# Patient Record
Sex: MALE | Race: WHITE | NOT HISPANIC OR LATINO | Employment: OTHER | URBAN - METROPOLITAN AREA
[De-identification: names, ages, dates, MRNs, and addresses within clinical notes are randomized per-mention and may not be internally consistent; named-entity substitution may affect disease eponyms.]

---

## 2017-12-23 ENCOUNTER — APPOINTMENT (OUTPATIENT)
Dept: GENERAL RADIOLOGY | Facility: HOSPITAL | Age: 67
End: 2017-12-23

## 2017-12-23 PROCEDURE — 71020 HC CHEST PA AND LATERAL: CPT | Performed by: FAMILY MEDICINE

## 2017-12-23 PROCEDURE — 71020 XR CHEST 2 VW: CPT | Performed by: FAMILY MEDICINE

## 2018-01-09 ENCOUNTER — OFFICE VISIT (OUTPATIENT)
Dept: FAMILY MEDICINE CLINIC | Facility: CLINIC | Age: 68
End: 2018-01-09

## 2018-01-09 VITALS
TEMPERATURE: 98.5 F | HEART RATE: 90 BPM | OXYGEN SATURATION: 97 % | BODY MASS INDEX: 36.48 KG/M2 | SYSTOLIC BLOOD PRESSURE: 160 MMHG | DIASTOLIC BLOOD PRESSURE: 95 MMHG | WEIGHT: 247 LBS

## 2018-01-09 DIAGNOSIS — I10 ESSENTIAL HYPERTENSION: Primary | ICD-10-CM

## 2018-01-09 DIAGNOSIS — J18.9 COMMUNITY ACQUIRED PNEUMONIA, UNSPECIFIED LATERALITY: ICD-10-CM

## 2018-01-09 DIAGNOSIS — R06.02 SHORTNESS OF BREATH: ICD-10-CM

## 2018-01-09 DIAGNOSIS — J45.901 ACUTE EXACERBATION OF COPD WITH ASTHMA (HCC): ICD-10-CM

## 2018-01-09 DIAGNOSIS — J44.1 ACUTE EXACERBATION OF COPD WITH ASTHMA (HCC): ICD-10-CM

## 2018-01-09 PROCEDURE — 99214 OFFICE O/P EST MOD 30 MIN: CPT | Performed by: FAMILY MEDICINE

## 2018-01-09 RX ORDER — DOXYCYCLINE HYCLATE 100 MG/1
100 CAPSULE ORAL 2 TIMES DAILY
Qty: 28 CAPSULE | Refills: 0 | OUTPATIENT
Start: 2018-01-09 | End: 2018-02-06

## 2018-01-09 RX ORDER — PREDNISONE 10 MG/1
TABLET ORAL
Qty: 63 TABLET | Refills: 0 | Status: SHIPPED | OUTPATIENT
Start: 2018-01-09 | End: 2018-01-21

## 2018-01-09 RX ORDER — HYDROCHLOROTHIAZIDE 25 MG/1
25 TABLET ORAL DAILY
Qty: 30 TABLET | Refills: 5 | Status: SHIPPED | OUTPATIENT
Start: 2018-01-09 | End: 2018-08-30 | Stop reason: SDUPTHER

## 2018-01-09 NOTE — PROGRESS NOTES
Subjective   Montana Franco is a 67 y.o. male. Presents today for   Chief Complaint   Patient presents with   • Establish Care     having problems with breathing, has taken several bouts of antibiotics and prednisone and doing some better.  His blood pressure stays high all the time.     New patient here to establish care;  I saw in  x2 in past and decided to transfer here.    Pneumonia   He complains of chest tightness, cough, difficulty breathing, frequent throat clearing, shortness of breath, sputum production and wheezing. Primary symptoms comments: In  gave levoquin for cap and steroid tapered dose for copd exacerbation.  Reports is doing much better, but still productive cough.  Originally dx severe cap 12/23/17.  At time saw, had nebulized colloidal silver that appeared to irritate lungs.. This is a recurrent problem. The current episode started 1 to 4 weeks ago. The problem occurs intermittently. The problem has been gradually improving. The cough is productive of brown sputum and productive of sputum. Pertinent negatives include no chest pain, fever, nasal congestion, orthopnea, PND, postnasal drip, rhinorrhea or sore throat. Exacerbated by: +smoker. His symptoms are alleviated by beta-agonist, ipratropium, steroid inhaler and oral steroids (completed course of levaquin, feels started to help but worsening again.  Completed course of prednisone.). He reports moderate improvement on treatment. Risk factors for lung disease include smoking/tobacco exposure. His past medical history is significant for COPD and pneumonia.   Hypertension   This is a chronic problem. The current episode started more than 1 month ago. The problem is unchanged. The problem is uncontrolled. Associated symptoms include shortness of breath. Pertinent negatives include no chest pain, peripheral edema (was having edema, but reports resolved now.) or PND. (At home, 140s to 200;  Relates often high when in pain.) There are no associated  agents to hypertension. Risk factors for coronary artery disease include male gender and obesity. Past treatments include angiotensin blockers. There is no history of kidney disease, CAD/MI, CVA or heart failure. There is no history of chronic renal disease.      Still using some e. cig    Review of Systems   Constitutional: Negative for chills and fever.   HENT: Negative for postnasal drip, rhinorrhea and sore throat.    Respiratory: Positive for cough, sputum production, shortness of breath and wheezing.    Cardiovascular: Negative for chest pain and PND.       The following portions of the patient's history were reviewed and updated as appropriate: allergies, current medications, past medical history, past social history and problem list.    Patient Active Problem List   Diagnosis   • Dry eyes   • Dry mouth   • Low back pain   • Pinched nerve     Past Medical History:   Diagnosis Date   • COPD (chronic obstructive pulmonary disease)    • Hypertension      No past surgical history on file.    Family History   Problem Relation Age of Onset   • Hypertension Mother    • Hypertension Father      Social History     Social History   • Marital status:      Spouse name: N/A   • Number of children: N/A   • Years of education: N/A     Social History Main Topics   • Smoking status: Current Every Day Smoker     Years: 54.00     Types: Cigars   • Smokeless tobacco: Never Used      Comment: smokes the ecig as well   • Alcohol use No   • Drug use: None   • Sexual activity: Not Asked     Other Topics Concern   • None     Social History Narrative         No Known Allergies    Current Outpatient Prescriptions on File Prior to Visit   Medication Sig Dispense Refill   • albuterol (PROVENTIL) (2.5 MG/3ML) 0.083% nebulizer solution Take 2.5 mg by nebulization Every 4 (Four) Hours As Needed for Wheezing.     • budesonide-formoterol (SYMBICORT) 160-4.5 MCG/ACT inhaler Inhale 2 puffs 2 (Two) Times a Day.     • ipratropium-albuterol  (DUONEB) 0.5-2.5 mg/mL nebulizer Neb 4 times daily while awake and every 4 hours as needed shortness of breath 360 mL 0   • losartan (COZAAR) 100 MG tablet Take 1 tablet by mouth Daily. 30 tablet 0     No current facility-administered medications on file prior to visit.        Objective   Vitals:    01/09/18 1450 01/09/18 1550   BP: (!) 204/96 160/95   Pulse: 90    Temp: 98.5 °F (36.9 °C)    SpO2: 97%    Weight: 112 kg (247 lb)        Physical Exam   Constitutional: He appears well-developed and well-nourished.   HENT:   Head: Normocephalic and atraumatic.   Right Ear: Tympanic membrane normal.   Left Ear: Tympanic membrane normal.   Mouth/Throat: Uvula is midline, oropharynx is clear and moist and mucous membranes are normal.   Neck: Neck supple. No hepatojugular reflux and no JVD present. No thyromegaly present.   Cardiovascular: Normal rate, regular rhythm and normal heart sounds.  Exam reveals no gallop and no friction rub.    No murmur heard.  Pulmonary/Chest: Effort normal. No accessory muscle usage. No tachypnea. No respiratory distress. He has decreased breath sounds. He has wheezes. He has no rales.   Abdominal: Soft. Bowel sounds are normal. He exhibits no distension. There is no tenderness. There is no rebound and no guarding.   Musculoskeletal: He exhibits edema.   Neurological: He is alert.   Skin: Skin is warm and dry.   Psychiatric: He has a normal mood and affect. His behavior is normal.   Nursing note and vitals reviewed.      Assessment/Plan   Montana was seen today for establish care.    Diagnoses and all orders for this visit:    Essential hypertension  -     hydrochlorothiazide (HYDRODIURIL) 25 MG tablet; Take 1 tablet by mouth Daily.  -     Comprehensive Metabolic Panel    Community acquired pneumonia, unspecified laterality  -     predniSONE (DELTASONE) 10 MG tablet; 6 tabs po qd x 3 days, 5 tabs po x 3d, 4 tabs po qd x 3d, 3 tabs po qd x 3 d, 2 tabs po qd x 3 days, 1 tab po qd x 3 days  -      doxycycline (VIBRAMYCIN) 100 MG capsule; Take 1 capsule by mouth 2 (Two) Times a Day.  -     CBC & Differential  -     Comprehensive Metabolic Panel  -     Sedimentation Rate  -     BNP  -     Legionella Antigen, Urine - Urine, Clean Catch  -     Histoplasma Ag Ur - Urine, Clean Catch    Acute exacerbation of COPD with asthma  -     CBC & Differential  -     Comprehensive Metabolic Panel  -     Sedimentation Rate  -     BNP  -     Legionella Antigen, Urine - Urine, Clean Catch  -     Histoplasma Ag Ur - Urine, Clean Catch  -     Ambulatory Referral to Pulmonology    Shortness of breath  -     CBC & Differential  -     Comprehensive Metabolic Panel  -     Sedimentation Rate  -     BNP  -     Legionella Antigen, Urine - Urine, Clean Catch  -     Histoplasma Ag Ur - Urine, Clean Catch    patient has had ongoing respiratory issues for several weeks now;  Has has some edema, no jvd or hjr, had inc inf of cap vs chf;  Will check bnp;  Will prolong abx and steroid;  Refer to pulmonary;  Reproting thick purulent sputum;  Continue inhalers  BP still not goal, continue losartan and will add hctz;  Stop furosemide.  Continue neb tx and symbicort (paying out of pocket).   Recommend avoid e. cig.  Stop e. cig    OBTW back pain, chiropractic care with relief in past, but person liked to see left practice.  OMT:  +tart anterior innomniate, sacral torsion, thoracic sd;  ME, HVLA with resolution of symptoms and improvement in findings.       -Follow up: 6 weeks       Current Outpatient Prescriptions:   •  albuterol (PROVENTIL) (2.5 MG/3ML) 0.083% nebulizer solution, Take 2.5 mg by nebulization Every 4 (Four) Hours As Needed for Wheezing., Disp: , Rfl:   •  budesonide-formoterol (SYMBICORT) 160-4.5 MCG/ACT inhaler, Inhale 2 puffs 2 (Two) Times a Day., Disp: , Rfl:   •  ipratropium-albuterol (DUONEB) 0.5-2.5 mg/mL nebulizer, Neb 4 times daily while awake and every 4 hours as needed shortness of breath, Disp: 360 mL, Rfl: 0  •   losartan (COZAAR) 100 MG tablet, Take 1 tablet by mouth Daily., Disp: 30 tablet, Rfl: 0  •  doxycycline (VIBRAMYCIN) 100 MG capsule, Take 1 capsule by mouth 2 (Two) Times a Day., Disp: 28 capsule, Rfl: 0  •  hydrochlorothiazide (HYDRODIURIL) 25 MG tablet, Take 1 tablet by mouth Daily., Disp: 30 tablet, Rfl: 5  •  predniSONE (DELTASONE) 10 MG tablet, 6 tabs po qd x 3 days, 5 tabs po x 3d, 4 tabs po qd x 3d, 3 tabs po qd x 3 d, 2 tabs po qd x 3 days, 1 tab po qd x 3 days, Disp: 63 tablet, Rfl: 0

## 2018-01-10 LAB
ALBUMIN SERPL-MCNC: 4.4 G/DL (ref 3.6–4.8)
ALBUMIN/GLOB SERPL: 1.8 {RATIO} (ref 1.2–2.2)
ALP SERPL-CCNC: 57 IU/L (ref 39–117)
ALT SERPL-CCNC: 44 IU/L (ref 0–44)
AST SERPL-CCNC: 22 IU/L (ref 0–40)
BASOPHILS # BLD AUTO: 0 X10E3/UL (ref 0–0.2)
BASOPHILS NFR BLD AUTO: 0 %
BILIRUB SERPL-MCNC: 0.5 MG/DL (ref 0–1.2)
BNP SERPL-MCNC: 173 PG/ML (ref 0–100)
BUN SERPL-MCNC: 23 MG/DL (ref 8–27)
BUN/CREAT SERPL: 23 (ref 10–24)
CALCIUM SERPL-MCNC: 8.9 MG/DL (ref 8.6–10.2)
CHLORIDE SERPL-SCNC: 100 MMOL/L (ref 96–106)
CO2 SERPL-SCNC: 29 MMOL/L (ref 18–29)
CREAT SERPL-MCNC: 1.02 MG/DL (ref 0.76–1.27)
EOSINOPHIL # BLD AUTO: 0.1 X10E3/UL (ref 0–0.4)
EOSINOPHIL NFR BLD AUTO: 1 %
ERYTHROCYTE [DISTWIDTH] IN BLOOD BY AUTOMATED COUNT: 15.1 % (ref 12.3–15.4)
ERYTHROCYTE [SEDIMENTATION RATE] IN BLOOD BY WESTERGREN METHOD: 11 MM/HR (ref 0–30)
GLOBULIN SER CALC-MCNC: 2.4 G/DL (ref 1.5–4.5)
GLUCOSE SERPL-MCNC: 107 MG/DL (ref 65–99)
HCT VFR BLD AUTO: 47.6 % (ref 37.5–51)
HGB BLD-MCNC: 16.1 G/DL (ref 13–17.7)
IMM GRANULOCYTES # BLD: 0 X10E3/UL (ref 0–0.1)
IMM GRANULOCYTES NFR BLD: 0 %
LYMPHOCYTES # BLD AUTO: 3 X10E3/UL (ref 0.7–3.1)
LYMPHOCYTES NFR BLD AUTO: 22 %
MCH RBC QN AUTO: 30.2 PG (ref 26.6–33)
MCHC RBC AUTO-ENTMCNC: 33.8 G/DL (ref 31.5–35.7)
MCV RBC AUTO: 89 FL (ref 79–97)
MONOCYTES # BLD AUTO: 1 X10E3/UL (ref 0.1–0.9)
MONOCYTES NFR BLD AUTO: 7 %
NEUTROPHILS # BLD AUTO: 9.5 X10E3/UL (ref 1.4–7)
NEUTROPHILS NFR BLD AUTO: 70 %
PLATELET # BLD AUTO: 180 X10E3/UL (ref 150–379)
POTASSIUM SERPL-SCNC: 4.7 MMOL/L (ref 3.5–5.2)
PROT SERPL-MCNC: 6.8 G/DL (ref 6–8.5)
RBC # BLD AUTO: 5.33 X10E6/UL (ref 4.14–5.8)
SODIUM SERPL-SCNC: 143 MMOL/L (ref 134–144)
WBC # BLD AUTO: 13.8 X10E3/UL (ref 3.4–10.8)

## 2018-01-10 NOTE — PROGRESS NOTES
Call results to patient.  WBC mildly elevated with left shift, which could suggest infection, though did take steroid of which can raise as well.  Sed rate (marker for inflammation is normal).   His kidney and liver function is normal.  BNP marker for heart failure is very borderline.  If this doesn't continue to improve current regimen, we should check an ECHO and consider CT chest.

## 2018-01-21 ENCOUNTER — APPOINTMENT (OUTPATIENT)
Dept: GENERAL RADIOLOGY | Facility: HOSPITAL | Age: 68
End: 2018-01-21

## 2018-01-21 PROCEDURE — 71046 X-RAY EXAM CHEST 2 VIEWS: CPT | Performed by: FAMILY MEDICINE

## 2018-01-23 ENCOUNTER — TELEPHONE (OUTPATIENT)
Dept: FAMILY MEDICINE CLINIC | Facility: CLINIC | Age: 68
End: 2018-01-23

## 2018-01-23 DIAGNOSIS — I16.0 HYPERTENSIVE URGENCY: ICD-10-CM

## 2018-01-23 DIAGNOSIS — J18.9 COMMUNITY ACQUIRED PNEUMONIA, UNSPECIFIED LATERALITY: ICD-10-CM

## 2018-01-23 DIAGNOSIS — J44.1 ACUTE EXACERBATION OF CHRONIC OBSTRUCTIVE PULMONARY DISEASE (COPD) (HCC): ICD-10-CM

## 2018-01-23 DIAGNOSIS — R06.02 SHORTNESS OF BREATH: ICD-10-CM

## 2018-01-23 RX ORDER — IPRATROPIUM BROMIDE AND ALBUTEROL SULFATE 2.5; .5 MG/3ML; MG/3ML
SOLUTION RESPIRATORY (INHALATION)
Qty: 360 ML | Refills: 0 | Status: SHIPPED | OUTPATIENT
Start: 2018-01-23 | End: 2018-02-19 | Stop reason: SDUPTHER

## 2018-01-23 RX ORDER — LOSARTAN POTASSIUM 100 MG/1
100 TABLET ORAL DAILY
Qty: 30 TABLET | Refills: 5 | Status: SHIPPED | OUTPATIENT
Start: 2018-01-23 | End: 2018-08-30 | Stop reason: SDUPTHER

## 2018-01-26 ENCOUNTER — TELEPHONE (OUTPATIENT)
Dept: FAMILY MEDICINE CLINIC | Facility: CLINIC | Age: 68
End: 2018-01-26

## 2018-01-26 RX ORDER — POTASSIUM CHLORIDE 750 MG/1
10 TABLET, FILM COATED, EXTENDED RELEASE ORAL DAILY
Qty: 30 TABLET | Refills: 0 | Status: SHIPPED | OUTPATIENT
Start: 2018-01-26 | End: 2018-02-24 | Stop reason: SDUPTHER

## 2018-01-26 RX ORDER — FUROSEMIDE 40 MG/1
40 TABLET ORAL DAILY
Qty: 30 TABLET | Refills: 0 | Status: SHIPPED | OUTPATIENT
Start: 2018-01-26 | End: 2018-02-20 | Stop reason: SDUPTHER

## 2018-01-26 NOTE — TELEPHONE ENCOUNTER
Send in lasix 40mg 1 po daily and kcl 10meq po daily.  Also let know, if not improving lung wise, we may need to have his heart checked out and send to cardiology including ordering ECHO.  Will see what pulmonary thinks.  If any chest pain or dyspnea again, go to ER this time.      RRJ

## 2018-01-31 ENCOUNTER — TRANSCRIBE ORDERS (OUTPATIENT)
Dept: ADMINISTRATIVE | Facility: HOSPITAL | Age: 68
End: 2018-01-31

## 2018-01-31 DIAGNOSIS — B99.9 RECURRENT INFECTIONS: Primary | ICD-10-CM

## 2018-02-05 ENCOUNTER — HOSPITAL ENCOUNTER (OUTPATIENT)
Dept: CT IMAGING | Facility: HOSPITAL | Age: 68
Discharge: HOME | End: 2018-02-05
Attending: INTERNAL MEDICINE | Admitting: INTERNAL MEDICINE

## 2018-02-05 DIAGNOSIS — B99.9 RECURRENT INFECTIONS: ICD-10-CM

## 2018-02-05 PROCEDURE — 71250 CT THORAX DX C-: CPT

## 2018-02-19 DIAGNOSIS — J18.9 COMMUNITY ACQUIRED PNEUMONIA, UNSPECIFIED LATERALITY: ICD-10-CM

## 2018-02-19 DIAGNOSIS — J44.1 ACUTE EXACERBATION OF CHRONIC OBSTRUCTIVE PULMONARY DISEASE (COPD) (HCC): ICD-10-CM

## 2018-02-19 DIAGNOSIS — R06.02 SHORTNESS OF BREATH: ICD-10-CM

## 2018-02-19 RX ORDER — IPRATROPIUM BROMIDE AND ALBUTEROL SULFATE 2.5; .5 MG/3ML; MG/3ML
SOLUTION RESPIRATORY (INHALATION)
Qty: 360 ML | Refills: 0 | Status: SHIPPED | OUTPATIENT
Start: 2018-02-19 | End: 2018-05-21 | Stop reason: SDUPTHER

## 2018-02-20 ENCOUNTER — OFFICE VISIT (OUTPATIENT)
Dept: FAMILY MEDICINE CLINIC | Facility: CLINIC | Age: 68
End: 2018-02-20

## 2018-02-20 VITALS
WEIGHT: 254 LBS | BODY MASS INDEX: 37.62 KG/M2 | TEMPERATURE: 98.1 F | HEART RATE: 91 BPM | HEIGHT: 69 IN | OXYGEN SATURATION: 97 % | SYSTOLIC BLOOD PRESSURE: 135 MMHG | DIASTOLIC BLOOD PRESSURE: 85 MMHG

## 2018-02-20 DIAGNOSIS — R60.0 LOWER EXTREMITY EDEMA: ICD-10-CM

## 2018-02-20 DIAGNOSIS — R06.02 SHORTNESS OF BREATH: Primary | ICD-10-CM

## 2018-02-20 DIAGNOSIS — I10 ESSENTIAL HYPERTENSION: ICD-10-CM

## 2018-02-20 DIAGNOSIS — G89.29 CHRONIC MIDLINE LOW BACK PAIN WITHOUT SCIATICA: ICD-10-CM

## 2018-02-20 DIAGNOSIS — M54.50 CHRONIC MIDLINE LOW BACK PAIN WITHOUT SCIATICA: ICD-10-CM

## 2018-02-20 DIAGNOSIS — J44.1 CHRONIC OBSTRUCTIVE PULMONARY DISEASE WITH ACUTE EXACERBATION (HCC): ICD-10-CM

## 2018-02-20 PROBLEM — J44.9 COPD (CHRONIC OBSTRUCTIVE PULMONARY DISEASE) (HCC): Status: ACTIVE | Noted: 2018-02-20

## 2018-02-20 PROCEDURE — 99214 OFFICE O/P EST MOD 30 MIN: CPT | Performed by: FAMILY MEDICINE

## 2018-02-20 RX ORDER — FUROSEMIDE 80 MG
80 TABLET ORAL DAILY
Qty: 30 TABLET | Refills: 5 | Status: SHIPPED | OUTPATIENT
Start: 2018-02-20 | End: 2018-05-17

## 2018-02-20 RX ORDER — BUDESONIDE AND FORMOTEROL FUMARATE DIHYDRATE 160; 4.5 UG/1; UG/1
2 AEROSOL RESPIRATORY (INHALATION)
Qty: 3 INHALER | Refills: 3 | Status: SHIPPED | OUTPATIENT
Start: 2018-02-20 | End: 2018-09-25 | Stop reason: SDUPTHER

## 2018-02-20 RX ORDER — DEXAMETHASONE 0.5 MG/1
0.5 TABLET ORAL
COMMUNITY
End: 2018-03-13 | Stop reason: ALTCHOICE

## 2018-02-20 NOTE — PROGRESS NOTES
Subjective   Montana Franco is a 67 y.o. male. Presents today for   Chief Complaint   Patient presents with   • Shortness of Breath     pt is still soa. he already saw pulmonologist and ent. pt had ct of head last saturday at [facility] to look for sinus infection. was ordered by ent, he goes for follow up later this week for results. he was put on amoxicillin and has finished it. he dropped off sputum samples at pulmonoloist yesterday for culture to see what is in his lungs.   • Hypertension     pt here for follow up visit for his b/p. the steroids you prescribed him at urgent care caused him to swell but he kept taking them. his soa was better on the steroids, but the swelling was too much to handle. he did finish the course. he is now on dexamethasone       Shortness of Breath   This is a chronic problem. The current episode started more than 1 month ago. The problem occurs constantly. The problem has been waxing and waning. Associated symptoms include coryza, leg swelling (started with steroid, got better off.), rhinorrhea, sputum production and wheezing. Pertinent negatives include no abdominal pain, chest pain, fever, orthopnea or PND. Nothing aggravates the symptoms. Associated symptoms comments: Cough productive yellow sputum.    Had CT chest from pulmonary, noted emphysema changes, linear scarring o/w clear.  Reports Dr. Prieto sent to ENT and had CT of sinuses over weekend, has not heard back on results yet.    Submitted sputum from pulmonary as well.  Has f/u 2/23/18 with ENT and Pulmonary 3/1/18.  . He has tried beta agonist inhalers, oral steroids and steroid inhalers (Has had several rounds of abx, gets a little better, but then re-occurs.  Currently on dexamethasone.) for the symptoms. The treatment provided mild (Taking refill of amoxil given by last primary;  taking lasix for lower extremity swelling.) relief. There is no history of CAD or PE.   Hypertension   This is a chronic problem. The current  episode started more than 1 month ago. The problem has been waxing and waning since onset. The problem is uncontrolled. Associated symptoms include peripheral edema and shortness of breath. Pertinent negatives include no chest pain, orthopnea, palpitations or PND. There are no associated agents to hypertension. Risk factors for coronary artery disease include obesity. Past treatments include angiotensin blockers and diuretics. The current treatment provides mild improvement. There are no compliance problems.  There is no history of kidney disease or CAD/MI. There is no history of chronic renal disease.       Review of Systems   Constitutional: Negative for chills and fever.   HENT: Positive for rhinorrhea.    Respiratory: Positive for sputum production, shortness of breath and wheezing.    Cardiovascular: Positive for leg swelling (started with steroid, got better off.). Negative for chest pain, palpitations, orthopnea and PND.   Gastrointestinal: Negative for abdominal pain.       The following portions of the patient's history were reviewed and updated as appropriate: allergies, current medications, past medical history, past social history and problem list.    Patient Active Problem List   Diagnosis   • Dry eyes   • Dry mouth   • Low back pain   • Pinched nerve   • COPD (chronic obstructive pulmonary disease)       No Known Allergies    Current Outpatient Prescriptions on File Prior to Visit   Medication Sig Dispense Refill   • hydrochlorothiazide (HYDRODIURIL) 25 MG tablet Take 1 tablet by mouth Daily. 30 tablet 5   • ipratropium-albuterol (DUO-NEB) 0.5-2.5 mg/mL nebulizer USE 1 VIAL PER NEBULIZER FOUR TIMES DAILY WHILE AWAKE AND EVERY 4 HOURS AS NEEDED FOR SHORTNESS OF BREATH 360 mL 0   • losartan (COZAAR) 100 MG tablet Take 1 tablet by mouth Daily. 30 tablet 5   • potassium chloride (KLOR-CON) 10 MEQ CR tablet Take 1 tablet by mouth Daily. 30 tablet 0   • [DISCONTINUED] amLODIPine (NORVASC) 5 MG tablet Take 1  "tablet by mouth Daily. 30 tablet 2   • [DISCONTINUED] budesonide-formoterol (SYMBICORT) 160-4.5 MCG/ACT inhaler Inhale 2 puffs 2 (Two) Times a Day.     • [DISCONTINUED] furosemide (LASIX) 40 MG tablet Take 1 tablet by mouth Daily. 30 tablet 0   • [DISCONTINUED] levoFLOXacin (LEVAQUIN) 500 MG tablet Take 1 tablet by mouth Daily. 7 tablet 0   • [DISCONTINUED] predniSONE (DELTASONE) 10 MG tablet 6 tabs po qd x 3 days, 5 tabs po x 3d, 4 tabs po qd x 3d, 3 tabs po qd x 3 d, 2 tabs po qd x 3 days, 1 tab po qd x 3 days (start 1/22/18) 63 tablet 0     No current facility-administered medications on file prior to visit.        Objective   Vitals:    02/20/18 1408 02/20/18 1532   BP: 160/72 135/85   BP Location: Left arm    Patient Position: Sitting    Cuff Size: Large Adult    Pulse: 91    Temp: 98.1 °F (36.7 °C)    TempSrc: Oral    SpO2: 97%    Weight: 115 kg (254 lb)    Height: 175.3 cm (69.02\")        Physical Exam   Constitutional: He appears well-developed and well-nourished.   HENT:   Head: Normocephalic and atraumatic.   Neck: Neck supple. No JVD present. No thyromegaly present.   Cardiovascular: Normal rate, regular rhythm and normal heart sounds.  Exam reveals no gallop and no friction rub.    No murmur heard.  Pulmonary/Chest: Effort normal. No tachypnea. No respiratory distress. He has decreased breath sounds. He has wheezes. He has rhonchi. He has no rales.   Abdominal: Soft. Bowel sounds are normal. He exhibits no distension. There is no tenderness. There is no rebound and no guarding.   Musculoskeletal: He exhibits no edema.   Neurological: He is alert.   Skin: Skin is warm and dry.   Psychiatric: He has a normal mood and affect. His behavior is normal.   Nursing note and vitals reviewed.    Component      Latest Ref Rng & Units 1/9/2018   WBC      3.4 - 10.8 x10E3/uL 13.8 (H)   RBC      4.14 - 5.80 x10E6/uL 5.33   Hemoglobin      13.0 - 17.7 g/dL 16.1   Hematocrit      37.5 - 51.0 % 47.6   MCV      79 - 97 fL " 89   MCH      26.6 - 33.0 pg 30.2   MCHC      31.5 - 35.7 g/dL 33.8   RDW      12.3 - 15.4 % 15.1   Platelets      150 - 379 x10E3/uL 180   Neutrophil %      Not Estab. % 70   Lymphocyte %      Not Estab. % 22   Monocyte %      Not Estab. % 7   Eosinophil %      Not Estab. % 1   Basophil %      Not Estab. % 0   Neutrophils, Absolute      1.4 - 7.0 x10E3/uL 9.5 (H)   Lymphocytes, Absolute      0.7 - 3.1 x10E3/uL 3.0   Monocytes, Absolute      0.1 - 0.9 x10E3/uL 1.0 (H)   Eosinophils, Absolute      0.0 - 0.4 x10E3/uL 0.1   Basophils, Absolute      0.0 - 0.2 x10E3/uL 0.0   Immature Grans %      Not Estab. % 0   Immature Grans, Absolute      0.0 - 0.1 x10E3/uL 0.0   Glucose      65 - 99 mg/dL 107 (H)   BUN      8 - 27 mg/dL 23   Creatinine      0.76 - 1.27 mg/dL 1.02   eGFR Non African Amer      >59 mL/min/1.73 76   eGFR  African Amer      >59 mL/min/1.73 88   BUN/Creatinine Ratio      10 - 24 23   Sodium      134 - 144 mmol/L 143   Potassium      3.5 - 5.2 mmol/L 4.7   Chloride      96 - 106 mmol/L 100   Total CO2      18 - 29 mmol/L 29   Calcium      8.6 - 10.2 mg/dL 8.9   Total Protein      6.0 - 8.5 g/dL 6.8   Albumin      3.6 - 4.8 g/dL 4.4   Globulin      1.5 - 4.5 g/dL 2.4   A/G Ratio      1.2 - 2.2 1.8   Total Bilirubin      0.0 - 1.2 mg/dL 0.5   Alkaline Phosphatase      39 - 117 IU/L 57   AST (SGOT)      0 - 40 IU/L 22   ALT (SGPT)      0 - 44 IU/L 44   Sed Rate      0 - 30 mm/hr 11   BNP      0.0 - 100.0 pg/mL 173.0 (H)       Assessment/Plan   Montana was seen today for shortness of breath and hypertension.    Diagnoses and all orders for this visit:    Shortness of breath  -     Ambulatory Referral to Cardiology  -     budesonide-formoterol (SYMBICORT) 160-4.5 MCG/ACT inhaler; Inhale 2 puffs 2 (Two) Times a Day.    Lower extremity edema  -     furosemide (LASIX) 80 MG tablet; Take 1 tablet by mouth Daily.  -     Ambulatory Referral to Cardiology    Essential hypertension    Chronic obstructive pulmonary  disease with acute exacerbation  -     budesonide-formoterol (SYMBICORT) 160-4.5 MCG/ACT inhaler; Inhale 2 puffs 2 (Two) Times a Day.    Chronic midline low back pain without sciatica  -     Ambulatory Referral to Physical Therapy Evaluate and treat        -1 cxr noted pulmonary edema vs pna;  D/w and with dyspnea, edema recommend see cardiology to evaluate further as well.  Will also increase lasix to 80mg daily.  Continue follow with pulmonary.  -bp now well controlled;  Home BP reading well.  -OBTW:  reports back pain, chronic;  Worse with weight gain;  Refer to PT;  OMT in office with relief (ME).         -Follow up: 3 months       Current Outpatient Prescriptions:   •  budesonide-formoterol (SYMBICORT) 160-4.5 MCG/ACT inhaler, Inhale 2 puffs 2 (Two) Times a Day., Disp: 3 inhaler, Rfl: 3  •  dexamethasone (DECADRON) 0.5 MG tablet, Take 0.5 mg by mouth Daily With Breakfast., Disp: , Rfl:   •  furosemide (LASIX) 80 MG tablet, Take 1 tablet by mouth Daily., Disp: 30 tablet, Rfl: 5  •  hydrochlorothiazide (HYDRODIURIL) 25 MG tablet, Take 1 tablet by mouth Daily., Disp: 30 tablet, Rfl: 5  •  ipratropium-albuterol (DUO-NEB) 0.5-2.5 mg/mL nebulizer, USE 1 VIAL PER NEBULIZER FOUR TIMES DAILY WHILE AWAKE AND EVERY 4 HOURS AS NEEDED FOR SHORTNESS OF BREATH, Disp: 360 mL, Rfl: 0  •  losartan (COZAAR) 100 MG tablet, Take 1 tablet by mouth Daily., Disp: 30 tablet, Rfl: 5  •  potassium chloride (KLOR-CON) 10 MEQ CR tablet, Take 1 tablet by mouth Daily., Disp: 30 tablet, Rfl: 0

## 2018-02-26 RX ORDER — POTASSIUM CHLORIDE 750 MG/1
TABLET, FILM COATED, EXTENDED RELEASE ORAL
Qty: 30 TABLET | Refills: 2 | Status: SHIPPED | OUTPATIENT
Start: 2018-02-26 | End: 2018-05-17

## 2018-03-06 ENCOUNTER — TREATMENT (OUTPATIENT)
Dept: PHYSICAL THERAPY | Facility: CLINIC | Age: 68
End: 2018-03-06

## 2018-03-06 DIAGNOSIS — M25.69 BACK STIFFNESS: ICD-10-CM

## 2018-03-06 DIAGNOSIS — M54.50 CHRONIC BILATERAL LOW BACK PAIN WITHOUT SCIATICA: Primary | ICD-10-CM

## 2018-03-06 DIAGNOSIS — G89.29 CHRONIC BILATERAL LOW BACK PAIN WITHOUT SCIATICA: Primary | ICD-10-CM

## 2018-03-06 PROCEDURE — 97161 PT EVAL LOW COMPLEX 20 MIN: CPT | Performed by: PHYSICAL THERAPIST

## 2018-03-06 PROCEDURE — G8978 MOBILITY CURRENT STATUS: HCPCS | Performed by: PHYSICAL THERAPIST

## 2018-03-06 PROCEDURE — 97140 MANUAL THERAPY 1/> REGIONS: CPT | Performed by: PHYSICAL THERAPIST

## 2018-03-06 PROCEDURE — 97110 THERAPEUTIC EXERCISES: CPT | Performed by: PHYSICAL THERAPIST

## 2018-03-06 PROCEDURE — G8979 MOBILITY GOAL STATUS: HCPCS | Performed by: PHYSICAL THERAPIST

## 2018-03-06 NOTE — PATIENT INSTRUCTIONS
Access Code: [email or web address]   ACDate: 03/06/2018   Prepared by: Delmar Morales     Exercises  Supine Lower Trunk Rotation - 20 reps - 2 sets - 2 hold - 1x daily - 5x weekly  Supine Piriformis Stretch with Foot on Ground - 2 reps - 2 sets - 30 hold - 1x daily - 5x weekly  Supine Knees to Chest with Swiss Ball - 15 reps - 2 sets - 2 hold - 1x daily - 5x weekly  Bridge with Arms at Sides and Feet on Swiss Ball - 15 reps - 2 sets - 2 hold - 1x daily - 5x weekly  Supine Hamstring Curl on Swiss Ball - 15 reps - 2 sets - 2 hold - 1x daily - 5x weekly  Seated Table Hamstring Stretch - 2 reps - 2 sets - 30 hold - 1x daily - 5x weekly

## 2018-03-06 NOTE — PROGRESS NOTES
Physical Therapy Initial Evaluation and Plan of Care      Patient: Montana Franco   : 1950  Diagnosis/ICD-10 Code:  Chronic bilateral low back pain without sciatica [M54.5, G89.29]  Referring practitioner: Kike Frost DO  Date of Initial Visit: 3/6/2018  Today's Date: 3/6/2018          Subjective Evaluation    History of Present Illness  Onset date: 2017.  Mechanism of injury: Patient reports that he has been having low back pain intermittently for ~ 20 years. In  he was dx with a spondylolisthesis at L5 (stage 2-3). He has seen Chiropractic care and physical therapy for this condition. The majority of his treatment has been Chiropractic over the past 2-3 years he reports that he was aggressively manipulated at his SIJ and has been worse ever since. The pain is localized to his low back and reports that he feels weak and very limited in mobility. Reports that he has a lung infection currently and is on antibiotics for the condition. Here today for evaluation and treatment of low back pain.      Pain  Current pain ratin  Location: Low back pain  Quality: dull ache (Sore)  Relieving factors: relaxation and rest (self massager)  Aggravating factors: lifting, squatting, repetitive movement, standing, ambulation and prolonged positioning (sitting)    Diagnostic Tests  X-ray: abnormal (No imaging to view (per pt report) )    Treatments  Previous treatment: physical therapy, chiropractic and medication  Patient Goals  Patient goals for therapy: decreased pain, increased motion, increased strength, independence with ADLs/IADLs and return to sport/leisure activities         oswestry score: 36/50 (72% impairment)     Objective     Special Questions      Additional Special Questions  No pain in legs.   Patient does note that he is on a diuretic for body fluid reduction.       Postural Observations  Seated posture: fair  Standing posture: fair    Additional Postural Observation Details  Patient  has increased lordosis and carries significant weight in his stomach    Palpation   Left   Tenderness of the erector spinae, lumbar paraspinals and quadratus lumborum.     Right Tenderness of the erector spinae, lumbar paraspinals and quadratus lumborum.     Additional Palpation Details  Significant tenderness at (B) glute med  Unable to assess psoas due to abdominal swelling     Neurological Testing     Sensation     Lumbar   Left   Intact: light touch    Right   Intact: light touch    Active Range of Motion     Lumbar   Flexion: 80 degrees   Extension: Active lumbar extension: lacking 4 deg.   Left lateral flexion: 5 degrees   Right lateral flexion: 5 degrees     Additional Active Range of Motion Details  With side bending patient is limited by soft tissue  Reports muscle cramping with Active rotation testing: Rotation 50% of WFL (B)  Patient lacking extension (4deg)     Passive Range of Motion   Left Hip   Flexion: 90 degrees   Extension: 5 degrees   External rotation (90/90): 18 degrees   Internal rotation (90/90): 0 degrees     Right Hip   Flexion: 90 degrees   Extension: 5 degrees   External rotation (90/90): 25 degrees   Internal rotation (90/90): 2 degrees     Additional Passive Range of Motion Details  Hip motion very limited    Strength/Myotome Testing     Left Hip   Planes of Motion   Flexion: 4  Extension: 3+  Abduction: 3+  Adduction: 4-    Right Hip   Planes of Motion   Flexion: 4  Extension: 4-  Abduction: 4-  Adduction: 4-    Left Knee   Flexion: 4+  Extension: 5    Right Knee   Flexion: 4+  Extension: 5    Left Ankle/Foot   Dorsiflexion: 4+    Right Ankle/Foot   Dorsiflexion: 5    Additional Strength Details  Patient has significant swelling on L LE (pitting edema)   Reports that his doctor is concerned for CHF    Muscle Activation   Patient unable to activate left transverse abdominals, left internal obliques, right transverse abdominals and right internal obliques.     Tests     Lumbar     Left    Negative passive SLR and quadrant.     Right   Negative passive SLR and quadrant.     Left Pelvic Girdle/Sacrum   Negative: sacrum compression.     Right Pelvic Girdle/Sacrum   Negative: sacrum compression.     Additional Tests Details  Tight hamstrings noted during SLR but test did not elicit back pain.     General Comments     Spine Comments  Difficult manual assessment/ palpation due to patient size/ shape         Assessment & Plan     Assessment  Impairments: abnormal gait, abnormal muscle tone, abnormal or restricted ROM, activity intolerance, impaired balance, impaired physical strength, lacks appropriate home exercise program, pain with function and safety issue  Assessment details: Patient presents with LBP that has been progressive for several months after a chiropractic manipulation. He has long history of low back pain and known spondylolisthesis.  Presents with weakness in core and LE strength, ROM limitations in hips and lumbar spine, functional movement impairments, and pain with normal motion. Pt would benefit from skilled PT services in order to address listed impairments and increase tolerance to normal daily activities including ADLs, work and recreational activities.    Prognosis: good  Prognosis details: GOALS  3 weeks. Pt will:  1. Be independent with HEP for hip and lumbar mobility/ strengthening  2. Perform advanced TrA/ multifidus retraining exercises with no increased pain  3. Patient will tolerate sitting longer than 10 min without increased low back pain.   4. perform sit to stand transfer x5 repetitions with SBA    6 weeks. Pt will:  1 Patient will tolerate walking > 1/4 mile without increased pain  2.   Patient will be able to pick item off the floor without back pain.   3. Patient will report > 75% resolution in back pain symptoms.   4. Patient will report tolerating sitting > 30 min without pain in back.   5. Patient will tolerate > 30 min of LE and core exercise without increased  pain.    6. Be (I) with long term HEP for core strength and hip/ back mobility.   Functional Limitations: carrying objects, lifting, pulling, pushing, reaching behind back and reaching overhead  Plan  Therapy options: will be seen for skilled physical therapy services  Planned modality interventions: cryotherapy, ultrasound, traction, TENS, thermotherapy (hydrocollator packs), high voltage pulsed current (pain management) and electrical stimulation/Russian stimulation  Planned therapy interventions: joint mobilization, IADL retraining, home exercise program, functional ROM exercises, flexibility, body mechanics training, ADL retraining, abdominal trunk stabilization, manual therapy, motor coordination training, neuromuscular re-education, stretching, strengthening, spinal/joint mobilization, therapeutic activities, postural training and soft tissue mobilization  Frequency: 2x week  Duration in weeks: 8  Treatment plan discussed with: patient  Plan details: Initial HEP was given on this date.        Manual Therapy:    10     mins  44174;  Therapeutic Exercise:    15     mins  51766;     Neuromuscular Chester:        mins  61634;    Therapeutic Activity:          mins  05661;     Gait Training:           mins  99784;     Ultrasound:          mins  04887;    Electrical Stimulation:         mins  33986 ( );  Dry Needling          mins self-pay    Timed Treatment:   25   mins   Total Treatment:     60   mins    PT SIGNATURE: Delmar Morales PT DPVERONICA   [place] License # [id number]  DATE TREATMENT INITIATED: 3/6/2018    Initial Certification  Certification Period: 6/4/2018  I certify that the therapy services are furnished while this patient is under my care.  The services outlined above are required by this patient, and will be reviewed every 90 days.     PHYSICIAN: Kike Frost, DO   ________________________________     DATE: ______________    Please sign and return via fax to [phone].. Thank you, [facility]  Therapy.

## 2018-03-13 ENCOUNTER — OFFICE VISIT (OUTPATIENT)
Dept: CARDIOLOGY | Facility: CLINIC | Age: 68
End: 2018-03-13

## 2018-03-13 VITALS
WEIGHT: 253 LBS | DIASTOLIC BLOOD PRESSURE: 86 MMHG | HEART RATE: 78 BPM | BODY MASS INDEX: 37.34 KG/M2 | SYSTOLIC BLOOD PRESSURE: 139 MMHG

## 2018-03-13 DIAGNOSIS — M79.89 LEG SWELLING: ICD-10-CM

## 2018-03-13 DIAGNOSIS — R94.31 ABNORMAL ELECTROCARDIOGRAM: ICD-10-CM

## 2018-03-13 DIAGNOSIS — R06.02 SOB (SHORTNESS OF BREATH): ICD-10-CM

## 2018-03-13 DIAGNOSIS — E66.09 CLASS 1 OBESITY DUE TO EXCESS CALORIES WITHOUT SERIOUS COMORBIDITY WITH BODY MASS INDEX (BMI) OF 30.0 TO 30.9 IN ADULT: Primary | ICD-10-CM

## 2018-03-13 PROBLEM — E66.811 CLASS 1 OBESITY DUE TO EXCESS CALORIES WITHOUT SERIOUS COMORBIDITY WITH BODY MASS INDEX (BMI) OF 30.0 TO 30.9 IN ADULT: Status: ACTIVE | Noted: 2018-03-13

## 2018-03-13 PROCEDURE — 99204 OFFICE O/P NEW MOD 45 MIN: CPT | Performed by: INTERNAL MEDICINE

## 2018-03-13 PROCEDURE — 93000 ELECTROCARDIOGRAM COMPLETE: CPT | Performed by: INTERNAL MEDICINE

## 2018-03-13 NOTE — PROGRESS NOTES
Subjective:        CC  Sob    Leg swelling         Montana Franco is a 67 y.o. male who Is here for the cardiac evaluation as the patient has been complaining of shortness of breath    Montana Franco has been complaining of the shortness of breath mild-to-moderate in intensity with mild-to-moderate usually relieved with rest associated with anxiety and fatigue    Patient also complaining of the bilateral leg swelling up to the midcalf     Cardiac risk factors: hypertension, male gender, obesity (BMI >= 30 kg/m2), sedentary lifestyle and smoking/ tobacco exposure.    Past Medical History:   Diagnosis Date   • COPD (chronic obstructive pulmonary disease)    • Hypertension     reports that he has quit smoking. His smoking use included Cigars and Electronic Cigarette. He quit after 54.00 years of use. He has never used smokeless tobacco. He reports that he does not drink alcohol.  Family History   Problem Relation Age of Onset   • Hypertension Mother    • Hypertension Father         Review of Systems  Constitutional: No wt loss, fever   Gastrointestinal: No nausea , abdominal pain  Behavioral/Psych: No insomnia or anxiety   Cardiovascular ----positive for Shortness of breath and leg swelling. All other systems reviewed and are negative    Objective:       Physical Exam             Physical Exam  /86   Pulse 78   Wt 115 kg (253 lb)   BMI 37.34 kg/m²     General appearance: NAD, conversant   Eyes: anicteric sclerae, moist conjunctivae; no lid-lag; PERRLA   HENT: Atraumatic; oropharynx clear with moist mucous membranes and no mucosal ulcerations;  normal hard and soft palate   Neck: Trachea midline; FROM, supple, no thyromegaly or lymphadenopathy   Lungs: CTA, with normal respiratory effort and no intercostal retractions   CV: S1-S2 regular, no murmurs, no rub, no gallop   Abdomen: Soft, non-tender; no masses or HSM   Extremities: 1 plus peripheral edema or extremity lymphadenopathy  Skin: Normal temperature, turgor  and texture; no rash, ulcers or subcutaneous nodules   Psych: Appropriate affect, alert and oriented to person, place and time           Cardiographics  @  ECG 12 Lead  Date/Time: 3/13/2018 10:24 AM  Performed by: HEMANT PABON  Authorized by: HEMANT PABON   Comparison: not compared with previous ECG   Previous ECG: no previous ECG available  Rhythm: sinus rhythm  ST Flattening: all  Clinical impression: non-specific ECG            Echocardiogram:     Imaging  Chest x-ray: not done     Lab Review   not applicable       Current Outpatient Prescriptions:   •  Amoxicillin-Pot Clavulanate (AUGMENTIN PO), Take  by mouth., Disp: , Rfl:   •  budesonide-formoterol (SYMBICORT) 160-4.5 MCG/ACT inhaler, Inhale 2 puffs 2 (Two) Times a Day., Disp: 3 inhaler, Rfl: 3  •  furosemide (LASIX) 80 MG tablet, Take 1 tablet by mouth Daily., Disp: 30 tablet, Rfl: 5  •  hydrochlorothiazide (HYDRODIURIL) 25 MG tablet, Take 1 tablet by mouth Daily., Disp: 30 tablet, Rfl: 5  •  ipratropium-albuterol (DUO-NEB) 0.5-2.5 mg/mL nebulizer, USE 1 VIAL PER NEBULIZER FOUR TIMES DAILY WHILE AWAKE AND EVERY 4 HOURS AS NEEDED FOR SHORTNESS OF BREATH, Disp: 360 mL, Rfl: 0  •  losartan (COZAAR) 100 MG tablet, Take 1 tablet by mouth Daily., Disp: 30 tablet, Rfl: 5  •  potassium chloride (K-DUR) 10 MEQ CR tablet, TAKE 1 TABLET BY MOUTH DAILY, Disp: 30 tablet, Rfl: 2        Assessment:      No diagnosis found.    Patient Active Problem List   Diagnosis   • Dry eyes   • Dry mouth   • Low back pain   • Pinched nerve   • COPD (chronic obstructive pulmonary disease)         Plan:          1. Class 1 obesity due to excess calories without serious comorbidity with body mass index (BMI) of 30.0 to 30.9 in adult  Specificity and sensitivity of the stress test/ cardiac workup has been explained. Pt has been explained if  Symptoms continue please go to ER, and further w/p will be required.    Also explained this does not rule out coronary artery  disease or the future events, continue to emphasize on risk reductions for coronary artery disease    - Stress Test With Myocardial Perfusion One Day  - Adult Transthoracic Echo Complete W/ Cont if Necessary Per Protocol  - Lipid Panel    2. SOB (shortness of breath)  Considering patient's medical condition as well as the risk factors, patient will require echocardiogram for further evaluation for the LV function, four-chamber evaluation, including the pressures, valvular function and  pericardial disease and pericardial effusion    - ECG 12 Lead  - Stress Test With Myocardial Perfusion One Day  - Adult Transthoracic Echo Complete W/ Cont if Necessary Per Protocol  - Lipid Panel    3. Leg swelling  Montana Franco has been complaining of the leg swelling, appears dependent pedal edema, significantly contributed with a venous insufficiency.    Strong recommendations of elevating the legs as well as using support hoses, along with the control of fluids and salt restriction has been explained in details    - Stress Test With Myocardial Perfusion One Day  - Adult Transthoracic Echo Complete W/ Cont if Necessary Per Protocol  - Lipid Panel    4. Abnormal electrocardiogram    - Stress Test With Myocardial Perfusion One Day  - Adult Transthoracic Echo Complete W/ Cont if Necessary Per Protocol  - Lipid Panel      Walking lexiscan cardiolyte, echo,     See us 2-3 wks    Pros and cons of ASA in primary and secondary prevention of CAD has been discussed.  Risks of bleeding and other possible side effects have been discussed, Diff advantages and disadvantages of 325 vs 81  Mg of ASA were discussed, at this stage it has been recommended to start ASA 81 mg /day       flp    Counseling was given to Montana Franco for the following topics:  risk factor reductions, prognosis and risks and benefits of treatment options .       SMOKING COUNSELING:    Counseling given: Not Answered      .  EMR Dragon/Transcription disclaimer:   Much of  this encounter note is an electronic transcription/translation of spoken language to printed text. The electronic translation of spoken language may permit erroneous, or at times, nonsensical words or phrases to be inadvertently transcribed; Although I have reviewed the note for such errors, some may still exist.

## 2018-03-15 ENCOUNTER — TREATMENT (OUTPATIENT)
Dept: PHYSICAL THERAPY | Facility: CLINIC | Age: 68
End: 2018-03-15

## 2018-03-15 DIAGNOSIS — M54.50 CHRONIC BILATERAL LOW BACK PAIN WITHOUT SCIATICA: Primary | ICD-10-CM

## 2018-03-15 DIAGNOSIS — M25.69 BACK STIFFNESS: ICD-10-CM

## 2018-03-15 DIAGNOSIS — G89.29 CHRONIC BILATERAL LOW BACK PAIN WITHOUT SCIATICA: Primary | ICD-10-CM

## 2018-03-15 PROCEDURE — 97112 NEUROMUSCULAR REEDUCATION: CPT | Performed by: PHYSICAL THERAPIST

## 2018-03-15 PROCEDURE — 97140 MANUAL THERAPY 1/> REGIONS: CPT | Performed by: PHYSICAL THERAPIST

## 2018-03-15 NOTE — PROGRESS NOTES
Physical Therapy Daily Progress Note  2 treatments  Subjective     Montana Franco reports: Patient reports that he is still having breathing issues. He reports that his HEP is very difficult for him and that with all of his other health issues he is having trouble getting the exercises done.         Objective   See Exercise, Manual, and Modality Logs for complete treatment.     R hamstring and hip joint more limited than L side during mobility work.     Assessment/Plan   Progressed TE and continued to focus on mobility with manual therapy for hips and low back.   Patient tolerated all treatment well and was able to tolerate progressions in therapeutic exercises without increased discomfort. Patient continues to need skilled therapy to improve low back pain, mobility, and activity tolerance. Will continue to advance POC as tolerated.   Additional treatment time past 30 min was not one on one time with PT and therefore will not be billed.  Progress per Plan of Care and Progress strengthening /stabilization /functional activity           Manual Therapy:    12     mins  53532;  Therapeutic Exercise:    20     mins  43605;     Neuromuscular Chester:    12    mins  97694;    Therapeutic Activity:          mins  98488;     Gait Training:           mins  42320;     Ultrasound:          mins  53868;    Electrical Stimulation:         mins  08705 ( );  Dry Needling          mins self-pay    Timed Treatment:   44   mins   Total Treatment:     50   mins    Delmar Morales, PT DPT  Physical Therapist  [place] License # [id number]

## 2018-03-20 ENCOUNTER — TREATMENT (OUTPATIENT)
Dept: PHYSICAL THERAPY | Facility: CLINIC | Age: 68
End: 2018-03-20

## 2018-03-20 DIAGNOSIS — G89.29 CHRONIC BILATERAL LOW BACK PAIN WITHOUT SCIATICA: Primary | ICD-10-CM

## 2018-03-20 DIAGNOSIS — M54.50 CHRONIC BILATERAL LOW BACK PAIN WITHOUT SCIATICA: Primary | ICD-10-CM

## 2018-03-20 DIAGNOSIS — M25.69 BACK STIFFNESS: ICD-10-CM

## 2018-03-20 PROCEDURE — 97140 MANUAL THERAPY 1/> REGIONS: CPT | Performed by: PHYSICAL THERAPIST

## 2018-03-20 PROCEDURE — 97110 THERAPEUTIC EXERCISES: CPT | Performed by: PHYSICAL THERAPIST

## 2018-03-20 NOTE — PROGRESS NOTES
Physical Therapy Daily Progress Note  3 treatments  Subjective     Montana Franco reports: Patient reports that he has been having increased difficulty breathing over the past week. He feels that the most recent antibotic he is taking is also not being effective at improving his lung infection. He notes again that he has gained 30 lbs in the past few months due to prednisone medication and multiple health issues.         Objective   See Exercise, Manual, and Modality Logs for complete treatment.       Assessment/Plan   Patient becomes fixated on his health issues quickly during each treatment and the aspects of his health that he feels contribute to his weight gain. Patient was educated to focus on the aspects of his therapy that he can control and to make small improvements each week rather than becoming fixated on how long the process may take for him to be where he wants to be physically. Patient tolerated all treatment well and was able to tolerate progressions in therapeutic exercises with minimal increased discomfort. Patient continues to need skilled therapy to improve lumbar mobility, LBP, and activity tolerance. Will continue to advance POC as tolerated.   Progress per Plan of Care and Progress strengthening /stabilization /functional activity  > 38 min one on one time spent with patient          Manual Therapy:    12     mins  65986;  Therapeutic Exercise:    30     mins  23415;     Neuromuscular Chester:        mins  30119;    Therapeutic Activity:          mins  45705;     Gait Training:           mins  69375;     Ultrasound:          mins  92009;    Electrical Stimulation:         mins  25034 ( );  Dry Needling          mins self-pay    Timed Treatment:   42   mins   Total Treatment:     45   mins    Delmar Morales PT DPT  Physical Therapist  [place] License # [id number]

## 2018-03-23 ENCOUNTER — TREATMENT (OUTPATIENT)
Dept: PHYSICAL THERAPY | Facility: CLINIC | Age: 68
End: 2018-03-23

## 2018-03-23 DIAGNOSIS — M25.69 BACK STIFFNESS: ICD-10-CM

## 2018-03-23 DIAGNOSIS — G89.29 CHRONIC BILATERAL LOW BACK PAIN WITHOUT SCIATICA: Primary | ICD-10-CM

## 2018-03-23 DIAGNOSIS — M54.50 CHRONIC BILATERAL LOW BACK PAIN WITHOUT SCIATICA: Primary | ICD-10-CM

## 2018-03-23 PROCEDURE — 97140 MANUAL THERAPY 1/> REGIONS: CPT | Performed by: PHYSICAL THERAPIST

## 2018-03-23 PROCEDURE — 97112 NEUROMUSCULAR REEDUCATION: CPT | Performed by: PHYSICAL THERAPIST

## 2018-03-23 PROCEDURE — 97110 THERAPEUTIC EXERCISES: CPT | Performed by: PHYSICAL THERAPIST

## 2018-03-23 NOTE — PROGRESS NOTES
Physical Therapy Daily Progress Note  4 treatments  Subjective     Montana Franco reports: he is feeling better today. He is breathing with less difficulty and able to move with less restrictions.         Objective   See Exercise, Manual, and Modality Logs for complete treatment.       Assessment/Plan   Patient is progressing activity tolerance with TE and mobility.   Patient tolerated all treatment well and was able to tolerate progressions in therapeutic exercises without increased discomfort. Patient continues to need skilled therapy to improve LBP, activity tolerance, and functional mobilty. Will continue to advance POC as tolerated. Patient had questions on cardio equipment use and was educated on the best sequence for progressively increasing variables.     Progress per Plan of Care and Progress strengthening /stabilization /functional activity           Manual Therapy:    8     mins  64256;  Therapeutic Exercise:    30     mins  64194;     Neuromuscular Chester:    10    mins  10101;    Therapeutic Activity:          mins  22258;     Gait Training:           mins  09411;     Ultrasound:          mins  46214;    Electrical Stimulation:         mins  77605 ( );  Dry Needling          mins self-pay    Timed Treatment:   48   mins   Total Treatment:     54   mins    Delmar Morales PT DPT  Physical Therapist  [place] License # [id number]

## 2018-03-26 ENCOUNTER — APPOINTMENT (OUTPATIENT)
Dept: CARDIOLOGY | Facility: HOSPITAL | Age: 68
End: 2018-03-26

## 2018-04-02 ENCOUNTER — HOSPITAL ENCOUNTER (OUTPATIENT)
Dept: CARDIOLOGY | Facility: HOSPITAL | Age: 68
Discharge: HOME | End: 2018-04-02
Attending: INTERNAL MEDICINE | Admitting: INTERNAL MEDICINE

## 2018-04-02 PROCEDURE — 93306 TTE W/DOPPLER COMPLETE: CPT | Performed by: INTERNAL MEDICINE

## 2018-04-02 PROCEDURE — 93306 TTE W/DOPPLER COMPLETE: CPT

## 2018-04-03 LAB
AORTIC DIMENSIONLESS INDEX: 0.8 (DI)
BH CV ECHO MEAS - ACS: 1.7 CM
BH CV ECHO MEAS - AO MAX PG (FULL): 2.5 MMHG
BH CV ECHO MEAS - AO MAX PG: 6.9 MMHG
BH CV ECHO MEAS - AO MEAN PG (FULL): 1 MMHG
BH CV ECHO MEAS - AO MEAN PG: 3 MMHG
BH CV ECHO MEAS - AO ROOT AREA (BSA CORRECTED): 1.4
BH CV ECHO MEAS - AO ROOT AREA: 8.6 CM^2
BH CV ECHO MEAS - AO ROOT DIAM: 3.3 CM
BH CV ECHO MEAS - AO V2 MAX: 131 CM/SEC
BH CV ECHO MEAS - AO V2 MEAN: 80.2 CM/SEC
BH CV ECHO MEAS - AO V2 VTI: 25.3 CM
BH CV ECHO MEAS - ASC AORTA: 2.9 CM
BH CV ECHO MEAS - AVA(I,A): 3.1 CM^2
BH CV ECHO MEAS - AVA(I,D): 3.1 CM^2
BH CV ECHO MEAS - AVA(V,A): 3 CM^2
BH CV ECHO MEAS - AVA(V,D): 3 CM^2
BH CV ECHO MEAS - BSA(HAYCOCK): 2.4 M^2
BH CV ECHO MEAS - BSA: 2.3 M^2
BH CV ECHO MEAS - BZI_BMI: 37.4 KILOGRAMS/M^2
BH CV ECHO MEAS - BZI_METRIC_HEIGHT: 175.3 CM
BH CV ECHO MEAS - BZI_METRIC_WEIGHT: 114.8 KG
BH CV ECHO MEAS - CONTRAST EF (2CH): 57.8 ML/M^2
BH CV ECHO MEAS - CONTRAST EF 4CH: 58.6 ML/M^2
BH CV ECHO MEAS - EDV(CUBED): 46.7 ML
BH CV ECHO MEAS - EDV(MOD-SP2): 64 ML
BH CV ECHO MEAS - EDV(MOD-SP4): 116 ML
BH CV ECHO MEAS - EDV(TEICH): 54.4 ML
BH CV ECHO MEAS - EF(CUBED): 62.3 %
BH CV ECHO MEAS - EF(MOD-SP2): 57.8 %
BH CV ECHO MEAS - EF(MOD-SP4): 58.6 %
BH CV ECHO MEAS - EF(TEICH): 54.8 %
BH CV ECHO MEAS - ESV(CUBED): 17.6 ML
BH CV ECHO MEAS - ESV(MOD-SP2): 27 ML
BH CV ECHO MEAS - ESV(MOD-SP4): 48 ML
BH CV ECHO MEAS - ESV(TEICH): 24.6 ML
BH CV ECHO MEAS - FS: 27.8 %
BH CV ECHO MEAS - IVS/LVPW: 1
BH CV ECHO MEAS - IVSD: 1.2 CM
BH CV ECHO MEAS - LAT PEAK E' VEL: 7 CM/SEC
BH CV ECHO MEAS - LV DIASTOLIC VOL/BSA (35-75): 50.8 ML/M^2
BH CV ECHO MEAS - LV MASS(C)D: 141.5 GRAMS
BH CV ECHO MEAS - LV MASS(C)DI: 62 GRAMS/M^2
BH CV ECHO MEAS - LV MAX PG: 4.4 MMHG
BH CV ECHO MEAS - LV MEAN PG: 2 MMHG
BH CV ECHO MEAS - LV SYSTOLIC VOL/BSA (12-30): 21 ML/M^2
BH CV ECHO MEAS - LV V1 MAX: 105 CM/SEC
BH CV ECHO MEAS - LV V1 MEAN: 67.9 CM/SEC
BH CV ECHO MEAS - LV V1 VTI: 20.6 CM
BH CV ECHO MEAS - LVIDD: 3.6 CM
BH CV ECHO MEAS - LVIDS: 2.6 CM
BH CV ECHO MEAS - LVLD AP2: 7.2 CM
BH CV ECHO MEAS - LVLD AP4: 8.3 CM
BH CV ECHO MEAS - LVLS AP2: 5.9 CM
BH CV ECHO MEAS - LVLS AP4: 7.1 CM
BH CV ECHO MEAS - LVOT AREA (M): 3.8 CM^2
BH CV ECHO MEAS - LVOT AREA: 3.8 CM^2
BH CV ECHO MEAS - LVOT DIAM: 2.2 CM
BH CV ECHO MEAS - LVPWD: 1.2 CM
BH CV ECHO MEAS - MED PEAK E' VEL: 6 CM/SEC
BH CV ECHO MEAS - MV A DUR: 1.1 SEC
BH CV ECHO MEAS - MV A MAX VEL: 98 CM/SEC
BH CV ECHO MEAS - MV DEC SLOPE: 530 CM/SEC^2
BH CV ECHO MEAS - MV DEC TIME: 0.16 SEC
BH CV ECHO MEAS - MV E MAX VEL: 62.6 CM/SEC
BH CV ECHO MEAS - MV E/A: 0.64
BH CV ECHO MEAS - MV MAX PG: 3.4 MMHG
BH CV ECHO MEAS - MV MEAN PG: 2 MMHG
BH CV ECHO MEAS - MV P1/2T MAX VEL: 86.9 CM/SEC
BH CV ECHO MEAS - MV P1/2T: 48 MSEC
BH CV ECHO MEAS - MV V2 MAX: 91.8 CM/SEC
BH CV ECHO MEAS - MV V2 MEAN: 56.4 CM/SEC
BH CV ECHO MEAS - MV V2 VTI: 25.8 CM
BH CV ECHO MEAS - MVA P1/2T LCG: 2.5 CM^2
BH CV ECHO MEAS - MVA(P1/2T): 4.6 CM^2
BH CV ECHO MEAS - MVA(VTI): 3 CM^2
BH CV ECHO MEAS - PA ACC TIME: 0.09 SEC
BH CV ECHO MEAS - PA MAX PG (FULL): 1.6 MMHG
BH CV ECHO MEAS - PA MAX PG: 3 MMHG
BH CV ECHO MEAS - PA PR(ACCEL): 39.9 MMHG
BH CV ECHO MEAS - PA V2 MAX: 86.9 CM/SEC
BH CV ECHO MEAS - PULM A REVS DUR: 1 SEC
BH CV ECHO MEAS - PULM A REVS VEL: 29 CM/SEC
BH CV ECHO MEAS - PULM DIAS VEL: 30.5 CM/SEC
BH CV ECHO MEAS - PULM S/D: 1.8
BH CV ECHO MEAS - PULM SYS VEL: 56.2 CM/SEC
BH CV ECHO MEAS - PVA(V,A): 1.9 CM^2
BH CV ECHO MEAS - PVA(V,D): 1.9 CM^2
BH CV ECHO MEAS - QP/QS: 0.45
BH CV ECHO MEAS - RAP SYSTOLE: 8 MMHG
BH CV ECHO MEAS - RV MAX PG: 1.4 MMHG
BH CV ECHO MEAS - RV MEAN PG: 1 MMHG
BH CV ECHO MEAS - RV V1 MAX: 58.7 CM/SEC
BH CV ECHO MEAS - RV V1 MEAN: 39.3 CM/SEC
BH CV ECHO MEAS - RV V1 VTI: 12.4 CM
BH CV ECHO MEAS - RVOT AREA: 2.8 CM^2
BH CV ECHO MEAS - RVOT DIAM: 1.9 CM
BH CV ECHO MEAS - SI(AO): 94.8 ML/M^2
BH CV ECHO MEAS - SI(CUBED): 12.7 ML/M^2
BH CV ECHO MEAS - SI(LVOT): 34.3 ML/M^2
BH CV ECHO MEAS - SI(MOD-SP2): 16.2 ML/M^2
BH CV ECHO MEAS - SI(MOD-SP4): 29.8 ML/M^2
BH CV ECHO MEAS - SI(TEICH): 13.1 ML/M^2
BH CV ECHO MEAS - SV(AO): 216.4 ML
BH CV ECHO MEAS - SV(CUBED): 29.1 ML
BH CV ECHO MEAS - SV(LVOT): 78.3 ML
BH CV ECHO MEAS - SV(MOD-SP2): 37 ML
BH CV ECHO MEAS - SV(MOD-SP4): 68 ML
BH CV ECHO MEAS - SV(RVOT): 35.2 ML
BH CV ECHO MEAS - SV(TEICH): 29.8 ML
BH CV ECHO MEAS - TAPSE (>1.6): 2.5 CM2
BH CV VAS BP LEFT ARM: NORMAL MMHG
BH CV XLRA - RV BASE: 3.4 CM
BH CV XLRA - TDI S': 15 CM/SEC
E/E' RATIO: 10
LEFT ATRIUM VOLUME INDEX: 16.7 ML/M2
MAXIMAL PREDICTED HEART RATE: 154 BPM
STRESS TARGET HR: 131 BPM

## 2018-04-09 ENCOUNTER — APPOINTMENT (OUTPATIENT)
Dept: CARDIOLOGY | Facility: HOSPITAL | Age: 68
End: 2018-04-09
Attending: INTERNAL MEDICINE

## 2018-04-09 ENCOUNTER — APPOINTMENT (OUTPATIENT)
Dept: CARDIOLOGY | Facility: HOSPITAL | Age: 68
End: 2018-04-09

## 2018-04-30 ENCOUNTER — HOSPITAL ENCOUNTER (OUTPATIENT)
Dept: CARDIOLOGY | Facility: HOSPITAL | Age: 68
Discharge: HOME | End: 2018-04-30
Attending: INTERNAL MEDICINE

## 2018-04-30 ENCOUNTER — HOSPITAL ENCOUNTER (OUTPATIENT)
Dept: CARDIOLOGY | Facility: HOSPITAL | Age: 68
Discharge: HOME | End: 2018-04-30
Admitting: INTERNAL MEDICINE

## 2018-04-30 VITALS
RESPIRATION RATE: 20 BRPM | SYSTOLIC BLOOD PRESSURE: 186 MMHG | DIASTOLIC BLOOD PRESSURE: 94 MMHG | HEART RATE: 66 BPM | HEIGHT: 69 IN | OXYGEN SATURATION: 100 % | WEIGHT: 254 LBS | BODY MASS INDEX: 37.62 KG/M2

## 2018-04-30 LAB
CHOLEST SERPL-MCNC: 185 MG/DL (ref 0–200)
HDLC SERPL-MCNC: 41 MG/DL (ref 40–60)
LDLC SERPL CALC-MCNC: 117 MG/DL (ref 0–100)
LDLC/HDLC SERPL: 2.85 {RATIO}
TRIGL SERPL-MCNC: 135 MG/DL (ref 0–150)
VLDLC SERPL-MCNC: 27 MG/DL (ref 5–40)

## 2018-04-30 PROCEDURE — 36415 COLL VENOUS BLD VENIPUNCTURE: CPT | Performed by: INTERNAL MEDICINE

## 2018-04-30 PROCEDURE — A9500 TC99M SESTAMIBI: HCPCS | Performed by: INTERNAL MEDICINE

## 2018-04-30 PROCEDURE — 93018 CV STRESS TEST I&R ONLY: CPT | Performed by: INTERNAL MEDICINE

## 2018-04-30 PROCEDURE — 93016 CV STRESS TEST SUPVJ ONLY: CPT | Performed by: INTERNAL MEDICINE

## 2018-04-30 PROCEDURE — 0 TECHNETIUM SESTAMIBI: Performed by: INTERNAL MEDICINE

## 2018-04-30 PROCEDURE — 80061 LIPID PANEL: CPT | Performed by: INTERNAL MEDICINE

## 2018-04-30 PROCEDURE — 78452 HT MUSCLE IMAGE SPECT MULT: CPT | Performed by: INTERNAL MEDICINE

## 2018-04-30 PROCEDURE — 93017 CV STRESS TEST TRACING ONLY: CPT

## 2018-04-30 PROCEDURE — 78452 HT MUSCLE IMAGE SPECT MULT: CPT

## 2018-04-30 RX ADMIN — TECHNETIUM TC 99M SESTAMIBI 1 DOSE: 1 INJECTION INTRAVENOUS at 08:31

## 2018-04-30 RX ADMIN — TECHNETIUM TC 99M SESTAMIBI 1 DOSE: 1 INJECTION INTRAVENOUS at 11:23

## 2018-05-01 LAB
BH CV STRESS BP STAGE 1: NORMAL
BH CV STRESS BP STAGE 2: NORMAL
BH CV STRESS DURATION MIN STAGE 1: 3
BH CV STRESS DURATION MIN STAGE 2: 0
BH CV STRESS DURATION SEC STAGE 1: 45
BH CV STRESS DURATION SEC STAGE 2: 58
BH CV STRESS GRADE STAGE 1: 10
BH CV STRESS GRADE STAGE 2: 12
BH CV STRESS HR STAGE 1: 123
BH CV STRESS HR STAGE 2: 130
BH CV STRESS METS STAGE 1: 4.6
BH CV STRESS METS STAGE 2: 5.1
BH CV STRESS O2 STAGE 1: 95
BH CV STRESS O2 STAGE 2: 97
BH CV STRESS PROTOCOL 1: NORMAL
BH CV STRESS RECOVERY BP: NORMAL MMHG
BH CV STRESS RECOVERY HR: 90 BPM
BH CV STRESS RECOVERY O2: 97 %
BH CV STRESS SPEED STAGE 1: 1.7
BH CV STRESS SPEED STAGE 2: 2.1
BH CV STRESS STAGE 1: 1
BH CV STRESS STAGE 2: 2
LV EF NUC BP: 64 %
MAXIMAL PREDICTED HEART RATE: 154 BPM
PERCENT MAX PREDICTED HR: 84.42 %
STRESS BASELINE BP: NORMAL MMHG
STRESS BASELINE HR: 90 BPM
STRESS O2 SAT REST: 100 %
STRESS PERCENT HR: 99 %
STRESS POST ESTIMATED WORKLOAD: 5.1 METS
STRESS POST EXERCISE DUR MIN: 4 MIN
STRESS POST EXERCISE DUR SEC: 58 SEC
STRESS POST O2 SAT PEAK: 97 %
STRESS POST PEAK BP: NORMAL MMHG
STRESS POST PEAK HR: 130 BPM
STRESS TARGET HR: 131 BPM

## 2018-05-07 ENCOUNTER — OFFICE VISIT (OUTPATIENT)
Dept: CARDIOLOGY | Facility: CLINIC | Age: 68
End: 2018-05-07

## 2018-05-07 VITALS — DIASTOLIC BLOOD PRESSURE: 90 MMHG | HEART RATE: 87 BPM | HEIGHT: 69 IN | SYSTOLIC BLOOD PRESSURE: 171 MMHG

## 2018-05-07 DIAGNOSIS — R06.02 SOB (SHORTNESS OF BREATH): ICD-10-CM

## 2018-05-07 DIAGNOSIS — M79.89 LEG SWELLING: ICD-10-CM

## 2018-05-07 DIAGNOSIS — E66.09 CLASS 1 OBESITY DUE TO EXCESS CALORIES WITHOUT SERIOUS COMORBIDITY WITH BODY MASS INDEX (BMI) OF 30.0 TO 30.9 IN ADULT: Primary | ICD-10-CM

## 2018-05-07 PROCEDURE — 99213 OFFICE O/P EST LOW 20 MIN: CPT | Performed by: INTERNAL MEDICINE

## 2018-05-07 NOTE — PROGRESS NOTES
" Subjective:       Montana Franco is a 67 y.o. male who here for follow up    CC  FOLLOW UP FOR STRESS TEST/ ECHO    STILL HAVE LEG SWELLING  HPI  67 his old male with obesity shortness of breath here for the follow-up after the stress test as well as echocardiogram still complains of the bilateral leg swelling mild-to-moderate in intensity up to the ankles     Problem List Items Addressed This Visit        Respiratory    SOB (shortness of breath)       Digestive    Class 1 obesity due to excess calories without serious comorbidity with body mass index (BMI) of 30.0 to 30.9 in adult - Primary       Other    Leg swelling      Other Visit Diagnoses    None.       .    The following portions of the patient's history were reviewed and updated as appropriate: allergies, current medications, past family history, past medical history, past social history, past surgical history and problem list.    Past Medical History:   Diagnosis Date   • COPD (chronic obstructive pulmonary disease)    • Hypertension     reports that he has quit smoking. His smoking use included Cigars and Electronic Cigarette. He quit after 54.00 years of use. He has never used smokeless tobacco. He reports that he does not drink alcohol or use drugs.  Family History   Problem Relation Age of Onset   • Hypertension Mother    • Hypertension Father        Review of Systems  Constitutional: No wt loss, fever, fatigue  Gastrointestinal: No nausea, abdominal pain  Behavioral/Psych: No insomnia or anxiety   Cardiovascular Leg swelling no chest pains  Objective:       Physical Exam           Physical Exam  /90   Pulse 87   Ht 175.3 cm (69\")     General appearance: NAD, conversant   Eyes: anicteric sclerae, moist conjunctivae; no lid-lag; PERRLA   HENT: Atraumatic; oropharynx clear with moist mucous membranes and no mucosal ulcerations;  normal hard and soft palate   Neck: Trachea midline; FROM, supple, no thyromegaly or lymphadenopathy   Lungs: CTA, with " normal respiratory effort and no intercostal retractions   CV: S1-S2 regular, no murmurs, no rub, no gallop   Abdomen: Soft, non-tender; no masses or HSM   Extremities: No peripheral edema or extremity lymphadenopathy  Skin: Normal temperature, turgor and texture; no rash, ulcers or subcutaneous nodules   Psych: Appropriate affect, alert and oriented to person, place and time           Cardiographics  @Procedures    Echocardiogram:        Current Outpatient Prescriptions:   •  budesonide-formoterol (SYMBICORT) 160-4.5 MCG/ACT inhaler, Inhale 2 puffs 2 (Two) Times a Day., Disp: 3 inhaler, Rfl: 3  •  furosemide (LASIX) 80 MG tablet, Take 1 tablet by mouth Daily., Disp: 30 tablet, Rfl: 5  •  hydrochlorothiazide (HYDRODIURIL) 25 MG tablet, Take 1 tablet by mouth Daily., Disp: 30 tablet, Rfl: 5  •  ipratropium-albuterol (DUO-NEB) 0.5-2.5 mg/mL nebulizer, USE 1 VIAL PER NEBULIZER FOUR TIMES DAILY WHILE AWAKE AND EVERY 4 HOURS AS NEEDED FOR SHORTNESS OF BREATH, Disp: 360 mL, Rfl: 0  •  losartan (COZAAR) 100 MG tablet, Take 1 tablet by mouth Daily., Disp: 30 tablet, Rfl: 5  •  potassium chloride (K-DUR) 10 MEQ CR tablet, TAKE 1 TABLET BY MOUTH DAILY, Disp: 30 tablet, Rfl: 2   Assessment:        Patient Active Problem List   Diagnosis   • Dry eyes   • Dry mouth   • Low back pain   • Pinched nerve   • COPD (chronic obstructive pulmonary disease)   • Class 1 obesity due to excess calories without serious comorbidity with body mass index (BMI) of 30.0 to 30.9 in adult   • SOB (shortness of breath)   • Leg swelling     Interpretation Summary        · Findings consistent with an equivocal ECG stress test.  · Left ventricular ejection fraction is normal (Calculated EF = 64%).  · Myocardial perfusion imaging indicates a normal myocardial perfusion study with no evidence of ischemia.  · Impressions are consistent with a low risk study         Interpretation Summary     · Calculated EF = 58.6%  · There is no evidence of pericardial  effusion.     Total Cholesterol 0 - 200 mg/dL 185    Triglycerides 0 - 150 mg/dL 135    HDL Cholesterol 40 - 60 mg/dL 41    LDL Cholesterol  0 - 100 mg/dL 117     VLDL Cholesterol 5 - 40 mg/dL 27    LDL/HDL Ratio  2.85            Plan:            ICD-10-CM ICD-9-CM   1. Class 1 obesity due to excess calories without serious comorbidity with body mass index (BMI) of 30.0 to 30.9 in adult E66.09 278.00    Z68.30 V85.30   2. SOB (shortness of breath) R06.02 786.05   3. Leg swelling M79.89 729.81     1. Class 1 obesity due to excess calories without serious comorbidity with body mass index (BMI) of 30.0 to 30.9 in adult  Counseling has been done    2. SOB (shortness of breath)  Multifactorial    3. Leg swelling  Montana Franco has been complaining of the leg swelling, appears dependent pedal edema, significantly contributed with a venous insufficiency.    Strong recommendations of elevating the legs as well as using support hoses, along with the control of fluids and salt restriction has been explained in details         NO TREATMENT FOR HIGH LDL  Specificity and sensitivity of the stress test/ cardiac workup has been explained. Pt has been explained if  Symptoms continue please go to ER, and further w/p will be required.    Also explained this does not rule out coronary artery disease or the future events, continue to emphasize on risk reductions for coronary artery disease  WATER AEROBICS    BP NORMAL AT HOME    Pros and cons of ASA in primary and secondary prevention of CAD has been discussed.  Risks of bleeding and other possible side effects have been discussed, Diff advantages and disadvantages of 325 vs 81  Mg of ASA were discussed, at this stage it has been recommended to start ASA 81 mg /day       LEG SWELLING MAY BE DUE TO PREVIOUS DVT    PT ERIN MEJIA  COUNSELING:    Montana FrancoAngela was given to patient for the following topics: diagnostic results, risk factor reductions, impressions, risks and benefits  of treatment options and importance of treatment compliance .       SMOKING COUNSELING:    Counseling given: Yes      EMR Dragon/Transcription disclaimer:   Much of this encounter note is an electronic transcription/translation of spoken language to printed text. The electronic translation of spoken language may permit erroneous, or at times, nonsensical words or phrases to be inadvertently transcribed; Although I have reviewed the note for such errors, some may still exist.

## 2018-05-17 ENCOUNTER — OFFICE VISIT (OUTPATIENT)
Dept: FAMILY MEDICINE CLINIC | Facility: CLINIC | Age: 68
End: 2018-05-17

## 2018-05-17 VITALS
OXYGEN SATURATION: 96 % | BODY MASS INDEX: 38.36 KG/M2 | DIASTOLIC BLOOD PRESSURE: 80 MMHG | WEIGHT: 259 LBS | HEART RATE: 91 BPM | SYSTOLIC BLOOD PRESSURE: 140 MMHG | TEMPERATURE: 98.7 F | HEIGHT: 69 IN

## 2018-05-17 DIAGNOSIS — J43.8 OTHER EMPHYSEMA (HCC): ICD-10-CM

## 2018-05-17 DIAGNOSIS — I10 ESSENTIAL HYPERTENSION: ICD-10-CM

## 2018-05-17 DIAGNOSIS — IMO0001 CLASS 2 OBESITY DUE TO EXCESS CALORIES WITH SERIOUS COMORBIDITY AND BODY MASS INDEX (BMI) OF 38.0 TO 38.9 IN ADULT: Primary | ICD-10-CM

## 2018-05-17 PROBLEM — J43.9 PULMONARY EMPHYSEMA (HCC): Status: ACTIVE | Noted: 2018-02-20

## 2018-05-17 PROCEDURE — 99214 OFFICE O/P EST MOD 30 MIN: CPT | Performed by: FAMILY MEDICINE

## 2018-05-17 RX ORDER — TOPIRAMATE 50 MG/1
CAPSULE, EXTENDED RELEASE ORAL
Qty: 30 CAPSULE | Refills: 5 | Status: SHIPPED | OUTPATIENT
Start: 2018-05-17 | End: 2018-08-14 | Stop reason: SINTOL

## 2018-05-17 NOTE — PROGRESS NOTES
Subjective   Montana Franco is a 67 y.o. male. Presents today for   Chief Complaint   Patient presents with   • Follow-up     pt here for 3 month follow up visit. he stopped taking the furosemide and potassium.       COPD   This is a chronic problem. The current episode started more than 1 month ago. The problem occurs intermittently. The problem has been waxing and waning. Pertinent negatives include no chest pain, chills or fever. Associated symptoms comments: Feels lungs improving past few weeks;  Has had ongoing issues since October 2018.. Exacerbated by: weigth gain. Treatments tried: inhalers. The treatment provided moderate relief.   Hypertension   This is a chronic problem. The current episode started more than 1 month ago. Associated symptoms include peripheral edema (slowly improving; ) and shortness of breath. Pertinent negatives include no chest pain, orthopnea or PND. There are no associated agents to hypertension. Risk factors for coronary artery disease include male gender and obesity. Current antihypertension treatment includes diuretics and angiotensin blockers. The current treatment provides moderate improvement. There are no compliance problems.  There is no history of kidney disease, CAD/MI, CVA or heart failure. There is no history of chronic renal disease. Saw cardiology and stress test/ECHO normal..      Has quit smoking though using ecig occly.    Review of Systems   Constitutional: Negative for chills and fever.   Respiratory: Positive for shortness of breath.    Cardiovascular: Negative for chest pain, orthopnea and PND.       The following portions of the patient's history were reviewed and updated as appropriate: allergies, current medications, past medical history, past social history and problem list.    Patient Active Problem List   Diagnosis   • Dry eyes   • Dry mouth   • Low back pain   • Pinched nerve   • Pulmonary emphysema   • Class 1 obesity due to excess calories without serious  "comorbidity with body mass index (BMI) of 30.0 to 30.9 in adult   • SOB (shortness of breath)   • Leg swelling   • Hypertension       No Known Allergies    Current Outpatient Prescriptions on File Prior to Visit   Medication Sig Dispense Refill   • budesonide-formoterol (SYMBICORT) 160-4.5 MCG/ACT inhaler Inhale 2 puffs 2 (Two) Times a Day. 3 inhaler 3   • hydrochlorothiazide (HYDRODIURIL) 25 MG tablet Take 1 tablet by mouth Daily. 30 tablet 5   • losartan (COZAAR) 100 MG tablet Take 1 tablet by mouth Daily. 30 tablet 5   • [DISCONTINUED] ipratropium-albuterol (DUO-NEB) 0.5-2.5 mg/mL nebulizer USE 1 VIAL PER NEBULIZER FOUR TIMES DAILY WHILE AWAKE AND EVERY 4 HOURS AS NEEDED FOR SHORTNESS OF BREATH 360 mL 0     No current facility-administered medications on file prior to visit.        Objective   Vitals:    05/17/18 1425 05/17/18 1450   BP: 162/80 140/80   BP Location: Right arm    Patient Position: Sitting    Cuff Size: Large Adult    Pulse: 91    Temp: 98.7 °F (37.1 °C)    TempSrc: Oral    SpO2: 96%    Weight: 117 kg (259 lb)    Height: 175.3 cm (69.02\")        Physical Exam   Constitutional: He appears well-developed and well-nourished.   HENT:   Head: Normocephalic and atraumatic.   Neck: Neck supple. No JVD present. No thyromegaly present.   Cardiovascular: Normal rate, regular rhythm and normal heart sounds.  Exam reveals no gallop and no friction rub.    No murmur heard.  Pulmonary/Chest: Effort normal and breath sounds normal. No respiratory distress. He has no wheezes. He has no rales.   Abdominal: Soft. Bowel sounds are normal. He exhibits no distension. There is no tenderness. There is no rebound and no guarding.   Musculoskeletal: He exhibits no edema.   Neurological: He is alert.   Skin: Skin is warm and dry.   Psychiatric: He has a normal mood and affect. His behavior is normal.   Nursing note and vitals reviewed.      Assessment/Plan   Montana was seen today for follow-up.    Diagnoses and all orders " for this visit:    Class 2 obesity due to excess calories with serious comorbidity and body mass index (BMI) of 38.0 to 38.9 in adult    Other emphysema    Essential hypertension    Other orders  -     Phentermine HCl 15 MG tablet dispersible; 1 po daily  -     Topiramate ER (TROKENDI XR) 50 MG capsule sustained-release 24 hr; 1 po daily    -bp better on recheck, at home bp fine per patient;  Continue medications  -continue inhalers, gave samples of symbicort  -counseled on diet and exercise along with weight loss.  Desires assistance with weight loss and discussed several medication optiosn;  Will try phentermine and trokendi, warned of side effects and risks;  Warned of addiction potential and cardiovascular risks of phentermine.  Denies glaucoma or renal stones;   I warned will need to watch BP very closely if give phentermine as can raise.       -Follow up: 1 months       Current Outpatient Prescriptions:   •  budesonide-formoterol (SYMBICORT) 160-4.5 MCG/ACT inhaler, Inhale 2 puffs 2 (Two) Times a Day., Disp: 3 inhaler, Rfl: 3  •  hydrochlorothiazide (HYDRODIURIL) 25 MG tablet, Take 1 tablet by mouth Daily., Disp: 30 tablet, Rfl: 5  •  losartan (COZAAR) 100 MG tablet, Take 1 tablet by mouth Daily., Disp: 30 tablet, Rfl: 5  •  ipratropium-albuterol (DUO-NEB) 0.5-2.5 mg/3 ml nebulizer, USE 1 VIAL PER NEBULIZER FOUR TIMES DAILY WHILE AWAKE AND EVERY 4 HOURS AS NEEDED FOR SHORTNESS OF BREATH, Disp: 360 mL, Rfl: 0  •  Phentermine HCl 15 MG tablet dispersible, 1 po daily, Disp: 30 tablet, Rfl: 1  •  Topiramate ER (TROKENDI XR) 50 MG capsule sustained-release 24 hr, 1 po daily, Disp: 30 capsule, Rfl: 5

## 2018-05-21 DIAGNOSIS — J18.9 COMMUNITY ACQUIRED PNEUMONIA, UNSPECIFIED LATERALITY: ICD-10-CM

## 2018-05-21 DIAGNOSIS — J44.1 ACUTE EXACERBATION OF CHRONIC OBSTRUCTIVE PULMONARY DISEASE (COPD) (HCC): ICD-10-CM

## 2018-05-21 DIAGNOSIS — R06.02 SHORTNESS OF BREATH: ICD-10-CM

## 2018-05-21 RX ORDER — IPRATROPIUM BROMIDE AND ALBUTEROL SULFATE 2.5; .5 MG/3ML; MG/3ML
SOLUTION RESPIRATORY (INHALATION)
Qty: 360 ML | Refills: 0 | Status: SHIPPED | OUTPATIENT
Start: 2018-05-21 | End: 2018-07-22 | Stop reason: SDUPTHER

## 2018-06-26 ENCOUNTER — OFFICE VISIT (OUTPATIENT)
Dept: FAMILY MEDICINE CLINIC | Facility: CLINIC | Age: 68
End: 2018-06-26

## 2018-06-26 VITALS
WEIGHT: 262 LBS | BODY MASS INDEX: 38.67 KG/M2 | SYSTOLIC BLOOD PRESSURE: 120 MMHG | OXYGEN SATURATION: 97 % | DIASTOLIC BLOOD PRESSURE: 75 MMHG | HEART RATE: 95 BPM | TEMPERATURE: 98.3 F

## 2018-06-26 DIAGNOSIS — E66.09 CLASS 1 OBESITY DUE TO EXCESS CALORIES WITHOUT SERIOUS COMORBIDITY WITH BODY MASS INDEX (BMI) OF 30.0 TO 30.9 IN ADULT: Primary | ICD-10-CM

## 2018-06-26 DIAGNOSIS — I10 ESSENTIAL HYPERTENSION: ICD-10-CM

## 2018-06-26 PROCEDURE — 99213 OFFICE O/P EST LOW 20 MIN: CPT | Performed by: FAMILY MEDICINE

## 2018-06-26 NOTE — PROGRESS NOTES
Subjective   Montana Franco is a 67 y.o. male. Presents today for   Chief Complaint   Patient presents with   • Follow-up     weight loss meds he only took for 3 days and had sinus infection and stopped.       Obesity   This is a chronic problem. The current episode started more than 1 year ago. The problem occurs constantly. The problem has been gradually worsening. Pertinent negatives include no abdominal pain, chest pain or fever. Exacerbated by: recurrent COPD issues. Treatments tried: Tried only for 3 days meds and tolerated, but flu like symptoms/sinusitis so held off meds.  Did not restart as very busy at work. Improvement on treatment: Not currently on medicatons.       Review of Systems   Constitutional: Negative for fever.   Cardiovascular: Negative for chest pain.   Gastrointestinal: Negative for abdominal pain.       Patient Active Problem List   Diagnosis   • Dry eyes   • Dry mouth   • Low back pain   • Pinched nerve   • Pulmonary emphysema   • Class 1 obesity due to excess calories without serious comorbidity with body mass index (BMI) of 30.0 to 30.9 in adult   • SOB (shortness of breath)   • Leg swelling   • Hypertension       Social History     Social History   • Marital status:      Social History Main Topics   • Smoking status: Former Smoker     Years: 54.00     Types: Cigars, Electronic Cigarette   • Smokeless tobacco: Never Used      Comment: smokes the ecig as well   • Alcohol use No   • Drug use: No   • Sexual activity: Defer     Other Topics Concern   • Not on file       No Known Allergies    Current Outpatient Prescriptions on File Prior to Visit   Medication Sig Dispense Refill   • budesonide-formoterol (SYMBICORT) 160-4.5 MCG/ACT inhaler Inhale 2 puffs 2 (Two) Times a Day. 3 inhaler 3   • hydrochlorothiazide (HYDRODIURIL) 25 MG tablet Take 1 tablet by mouth Daily. 30 tablet 5   • ipratropium-albuterol (DUO-NEB) 0.5-2.5 mg/3 ml nebulizer USE 1 VIAL PER NEBULIZER FOUR TIMES DAILY WHILE  AWAKE AND EVERY 4 HOURS AS NEEDED FOR SHORTNESS OF BREATH 360 mL 0   • losartan (COZAAR) 100 MG tablet Take 1 tablet by mouth Daily. 30 tablet 5   • Phentermine HCl 15 MG tablet dispersible 1 po daily 30 tablet 1   • Topiramate ER (TROKENDI XR) 50 MG capsule sustained-release 24 hr 1 po daily 30 capsule 5     No current facility-administered medications on file prior to visit.        Objective   Vitals:    06/26/18 1336 06/26/18 1354   BP: 164/70 120/75   Pulse: 95    Temp: 98.3 °F (36.8 °C)    SpO2: 97%    Weight: 119 kg (262 lb)        Physical Exam   Constitutional: He appears well-developed and well-nourished.   Neck: Neck supple.   Neurological: He is alert.   Skin: Skin is warm and dry.   Psychiatric: He has a normal mood and affect. His behavior is normal.   Nursing note and vitals reviewed.      Assessment/Plan   Montana was seen today for follow-up.    Diagnoses and all orders for this visit:    Class 1 obesity due to excess calories without serious comorbidity with body mass index (BMI) of 30.0 to 30.9 in adult    Essential hypertension    -restart phentermine and trokendi, relates lost 5 lbs in 3 days when took;  Hopefully help back and joint pain.         -Follow up:  3 months and prn

## 2018-07-22 DIAGNOSIS — J44.1 ACUTE EXACERBATION OF CHRONIC OBSTRUCTIVE PULMONARY DISEASE (COPD) (HCC): ICD-10-CM

## 2018-07-22 DIAGNOSIS — J18.9 COMMUNITY ACQUIRED PNEUMONIA, UNSPECIFIED LATERALITY: ICD-10-CM

## 2018-07-22 DIAGNOSIS — R06.02 SHORTNESS OF BREATH: ICD-10-CM

## 2018-07-23 RX ORDER — IPRATROPIUM BROMIDE AND ALBUTEROL SULFATE 2.5; .5 MG/3ML; MG/3ML
SOLUTION RESPIRATORY (INHALATION)
Qty: 360 ML | Refills: 0 | Status: SHIPPED | OUTPATIENT
Start: 2018-07-23 | End: 2018-11-06 | Stop reason: SDUPTHER

## 2018-07-30 ENCOUNTER — OFFICE VISIT (OUTPATIENT)
Dept: FAMILY MEDICINE CLINIC | Facility: CLINIC | Age: 68
End: 2018-07-30

## 2018-07-30 VITALS
TEMPERATURE: 98.2 F | OXYGEN SATURATION: 97 % | SYSTOLIC BLOOD PRESSURE: 164 MMHG | WEIGHT: 243 LBS | HEART RATE: 88 BPM | HEIGHT: 69 IN | BODY MASS INDEX: 35.99 KG/M2 | DIASTOLIC BLOOD PRESSURE: 78 MMHG

## 2018-07-30 DIAGNOSIS — L03.116 CELLULITIS OF LEFT LOWER EXTREMITY: Primary | ICD-10-CM

## 2018-07-30 PROCEDURE — 99213 OFFICE O/P EST LOW 20 MIN: CPT | Performed by: NURSE PRACTITIONER

## 2018-07-30 RX ORDER — CEPHALEXIN 500 MG/1
500 CAPSULE ORAL 3 TIMES DAILY
Qty: 30 CAPSULE | Refills: 0 | Status: SHIPPED | OUTPATIENT
Start: 2018-07-30 | End: 2018-08-14

## 2018-07-30 NOTE — PROGRESS NOTES
"Subjective   Montana Franco is a 67 y.o. male who presents c/o cellulitis in RLE x 1 month. Went to  7/17 and was dx with cellulitis and was given keflex and mupirocin ointment. Leg still appears infected and antibiotics are gone.     History of Present Illness   Area to L calf healing slowly, has had cellulitis in 2015, had cardiac workup secondary to leg swelling. Worried as lesion is so painful.   The following portions of the patient's history were reviewed and updated as appropriate: allergies, current medications, past family history, past medical history, past social history, past surgical history and problem list.    Review of Systems   Constitutional: Negative for chills and fever.   Cardiovascular: Positive for leg swelling (resolved quickly with cephalexin. ).   Skin: Positive for color change and skin lesions.   Psychiatric/Behavioral: Negative.      /78   Pulse 88   Temp 98.2 °F (36.8 °C) (Oral)   Ht 175.3 cm (69\")   Wt 110 kg (243 lb)   SpO2 97%   BMI 35.88 kg/m²     Objective   Physical Exam   Constitutional: He appears well-developed and well-nourished. No distress.   Skin: He is not diaphoretic. There is erythema.   Posterior calf abraded lesion 1cm with irregular borders in a red base.    Psychiatric: He has a normal mood and affect. His behavior is normal. Judgment and thought content normal.   Nursing note and vitals reviewed.    Assessment/Plan   Problems Addressed this Visit     None      Visit Diagnoses     Cellulitis of left lower extremity    -  Primary    Relevant Medications    cephalexin (KEFLEX) 500 MG capsule        No edema to LLE, lesion is painful. Would continue mupirocin to open area. Refill cephalexin. FU if lesion not settling in 10 days.          "

## 2018-08-06 ENCOUNTER — OFFICE VISIT (OUTPATIENT)
Dept: CARDIOLOGY | Facility: CLINIC | Age: 68
End: 2018-08-06

## 2018-08-06 VITALS
SYSTOLIC BLOOD PRESSURE: 169 MMHG | DIASTOLIC BLOOD PRESSURE: 83 MMHG | WEIGHT: 247 LBS | HEART RATE: 82 BPM | BODY MASS INDEX: 36.58 KG/M2 | HEIGHT: 69 IN

## 2018-08-06 DIAGNOSIS — E66.09 CLASS 1 OBESITY DUE TO EXCESS CALORIES WITHOUT SERIOUS COMORBIDITY WITH BODY MASS INDEX (BMI) OF 30.0 TO 30.9 IN ADULT: ICD-10-CM

## 2018-08-06 DIAGNOSIS — R06.02 SOB (SHORTNESS OF BREATH): ICD-10-CM

## 2018-08-06 DIAGNOSIS — I10 ESSENTIAL HYPERTENSION: Primary | ICD-10-CM

## 2018-08-06 PROCEDURE — 99214 OFFICE O/P EST MOD 30 MIN: CPT | Performed by: INTERNAL MEDICINE

## 2018-08-06 RX ORDER — ASPIRIN 81 MG/1
81 TABLET, CHEWABLE ORAL DAILY
COMMUNITY

## 2018-08-06 NOTE — PROGRESS NOTES
" Subjective:       Montana Franco is a 67 y.o. male who here for follow up    CC  LEG SWELLING    ER VISIT FOR CELLULITIS      HPI  67 his old male with known history of benign essential arterial hypertension, shortness of breath as well as obesity has been complaining of bilateral leg swelling went to the emergency room was found to have cellulitis and here for the further follow-up     Problem List Items Addressed This Visit        Cardiovascular and Mediastinum    Hypertension - Primary       Respiratory    SOB (shortness of breath)       Digestive    Class 1 obesity due to excess calories without serious comorbidity with body mass index (BMI) of 30.0 to 30.9 in adult        .    The following portions of the patient's history were reviewed and updated as appropriate: allergies, current medications, past family history, past medical history, past social history, past surgical history and problem list.    Past Medical History:   Diagnosis Date   • Cellulitis     LEFT LEG   • COPD (chronic obstructive pulmonary disease) (CMS/[facility])    • Hypertension     reports that he has quit smoking. His smoking use included Cigars and Electronic Cigarette. He quit after 54.00 years of use. He has never used smokeless tobacco. He reports that he does not drink alcohol or use drugs.  Family History   Problem Relation Age of Onset   • Hypertension Mother    • Hypertension Father        Review of Systems  Constitutional: No wt loss, fever, fatigue  Gastrointestinal: No nausea, abdominal pain  Behavioral/Psych: No insomnia or anxiety   Cardiovascular leg swelling  Objective:                 Physical Exam  /83   Pulse 82   Ht 175.3 cm (69\")   Wt 112 kg (247 lb)   BMI 36.48 kg/m²     General appearance: NAD, conversant   Eyes: anicteric sclerae, moist conjunctivae; no lid-lag; PERRLA   HENT: Atraumatic; oropharynx clear with moist mucous membranes and no mucosal ulcerations;  normal hard and soft palate   Neck: Trachea midline; " FROM, supple, no thyromegaly or lymphadenopathy   Lungs: CTA, with normal respiratory effort and no intercostal retractions   CV: S1-S2 regular, no murmurs, no rub, no gallop   Abdomen: Soft, non-tender; no masses or HSM   Extremities: 2+ peripheral edema or extremity lymphadenopathy  Skin: Normal temperature, turgor and texture; no rash, ulcers or subcutaneous nodules   Psych: Appropriate affect, alert and oriented to person, place and time           Cardiographics  @Procedures    Echocardiogram:        Current Outpatient Prescriptions:   •  aspirin 81 MG chewable tablet, Chew 81 mg Daily., Disp: , Rfl:   •  budesonide-formoterol (SYMBICORT) 160-4.5 MCG/ACT inhaler, Inhale 2 puffs 2 (Two) Times a Day., Disp: 3 inhaler, Rfl: 3  •  cephalexin (KEFLEX) 500 MG capsule, Take 1 capsule by mouth 3 (Three) Times a Day., Disp: 30 capsule, Rfl: 0  •  hydrochlorothiazide (HYDRODIURIL) 25 MG tablet, Take 1 tablet by mouth Daily., Disp: 30 tablet, Rfl: 5  •  ipratropium-albuterol (DUO-NEB) 0.5-2.5 mg/3 ml nebulizer, USE 1 VIAL PER NEBULIZER FOUR TIMES DAILY WHILE AWAKE AND EVERY 4 HOURS AS NEEDED FOR SHORTNESS OF BREATH, Disp: 360 mL, Rfl: 0  •  losartan (COZAAR) 100 MG tablet, Take 1 tablet by mouth Daily., Disp: 30 tablet, Rfl: 5  •  mupirocin (BACTROBAN) 2 % ointment, JOESPH EXT AA TID FOR 7 DAYS, Disp: , Rfl: 0  •  Phentermine HCl 15 MG tablet dispersible, 1 po daily, Disp: 30 tablet, Rfl: 1  •  Topiramate ER (TROKENDI XR) 50 MG capsule sustained-release 24 hr, 1 po daily, Disp: 30 capsule, Rfl: 5   Assessment:        Patient Active Problem List   Diagnosis   • Dry eyes   • Dry mouth   • Low back pain   • Pinched nerve   • Pulmonary emphysema (CMS/HCC)   • Class 1 obesity due to excess calories without serious comorbidity with body mass index (BMI) of 30.0 to 30.9 in adult   • SOB (shortness of breath)   • Leg swelling   • Hypertension     Interpretation Summary        · Findings consistent with an equivocal ECG stress  test.  · Left ventricular ejection fraction is normal (Calculated EF = 64%).  · Myocardial perfusion imaging indicates a normal myocardial perfusion study with no evidence of ischemia.  · Impressions are consistent with a low risk study.     Interpretation Summary     · Calculated EF = 58.6%  · There is no evidence of pericardial effusion.         Ref Range & Units 3mo ago   Total Cholesterol 0 - 200 mg/dL 185    Triglycerides 0 - 150 mg/dL 135    HDL Cholesterol 40 - 60 mg/dL 41    LDL Cholesterol  0 - 100 mg/dL 117     VLDL Cholesterol 5 - 40 mg/dL 27                Plan:            ICD-10-CM ICD-9-CM   1. Essential hypertension I10 401.9   2. SOB (shortness of breath) R06.02 786.05   3. Class 1 obesity due to excess calories without serious comorbidity with body mass index (BMI) of 30.0 to 30.9 in adult E66.09 278.00    Z68.30 V85.30     1. Essential hypertension  Importance of controlling hypertension and blood pressure  checkup on the regular basis has been explained  Hypertension as a silent killer has been discussed  Risk reduction of the weight and regular exercises to control the hypertension has been explained      2. SOB (shortness of breath)  Considering patient's medical condition as well as the risk factors, patient will require echocardiogram for further evaluation for the LV function, four-chamber evaluation, including the pressures, valvular function and  pericardial disease and pericardial effusion      3. Class 1 obesity due to excess calories without serious comorbidity with body mass index (BMI) of 30.0 to 30.9 in adult  Significant risk of obesity to CAD, HTN has been explained  Advantages of wt reduction has been explained       BP RUNNING NORMAL    SEE IN 1 YR    CELLULITES OF BOTH LOWER LEGS    VARICOSE VEIN MD REFERAL  COUNSELING:    Montana Madrid was given to patient for the following topics: diagnostic results, risk factor reductions, impressions, risks and benefits of treatment  options and importance of treatment compliance .       SMOKING COUNSELING:    Counseling given: Not Answered      EMR Dragon/Transcription disclaimer:   Much of this encounter note is an electronic transcription/translation of spoken language to printed text. The electronic translation of spoken language may permit erroneous, or at times, nonsensical words or phrases to be inadvertently transcribed; Although I have reviewed the note for such errors, some may still exist.

## 2018-08-13 ENCOUNTER — TELEPHONE (OUTPATIENT)
Dept: FAMILY MEDICINE CLINIC | Facility: CLINIC | Age: 68
End: 2018-08-13

## 2018-08-14 ENCOUNTER — OFFICE VISIT (OUTPATIENT)
Dept: FAMILY MEDICINE CLINIC | Facility: CLINIC | Age: 68
End: 2018-08-14

## 2018-08-14 VITALS
HEART RATE: 78 BPM | WEIGHT: 239 LBS | BODY MASS INDEX: 35.4 KG/M2 | DIASTOLIC BLOOD PRESSURE: 84 MMHG | OXYGEN SATURATION: 98 % | SYSTOLIC BLOOD PRESSURE: 142 MMHG | TEMPERATURE: 98.1 F | HEIGHT: 69 IN

## 2018-08-14 DIAGNOSIS — L08.9 WOUND INFECTION: ICD-10-CM

## 2018-08-14 DIAGNOSIS — L03.116 CELLULITIS OF LEFT LOWER EXTREMITY: Primary | ICD-10-CM

## 2018-08-14 DIAGNOSIS — T14.8XXA WOUND INFECTION: ICD-10-CM

## 2018-08-14 PROCEDURE — 99213 OFFICE O/P EST LOW 20 MIN: CPT | Performed by: FAMILY MEDICINE

## 2018-08-14 RX ORDER — CLINDAMYCIN HYDROCHLORIDE 300 MG/1
300 CAPSULE ORAL 3 TIMES DAILY
Qty: 30 CAPSULE | Refills: 0 | Status: SHIPPED | OUTPATIENT
Start: 2018-08-14 | End: 2018-09-25

## 2018-08-14 NOTE — PROGRESS NOTES
Subjective   Montana Franco is a 67 y.o. male. Presents today for   Chief Complaint   Patient presents with   • Cellulitis     PT HAS CELLULITIS ON HIS LEFT LEG. HE WAS TREATED AT URGENT CARE WITH KEFLEX X 10 DAYS, THEN WAS TREATED BY [facility] WITH KEFLEX X 10 DAYS, STILL HAS INFECTION.       Rash   This is a new problem. The current episode started 1 to 4 weeks ago. The problem is unchanged. Location: LEFT ANKLE, SMALL SKIN ULCERATOINS;  ONE NEW 2T 3 DAYS AGO AFTER BULLA RUPTURED;  POSTERIOR LOWER LEG PRESENT SINCE JULY 4. The rash is characterized by blistering, redness and swelling. Associated with: REPORTS SCRATCHED BY DOG PLAYING WITH HIM AND LEG. Pertinent negatives include no fever. Past treatments include antibiotics. The treatment provided mild (COMPLETED 2 ROUNDS OF KEFLEX) relief.   Has varicose veins, no leg cramps;    PATIENT HAS CHANGED DIET, REDUCING CALORIES AND DOWN PER OUR SCALE DOWN 23LBS SINCE June 26, 2018.  GOAL IS HIT 174LBS  Review of Systems   Constitutional: Negative for chills and fever.   Skin: Positive for rash and wound.       Patient Active Problem List   Diagnosis   • Dry eyes   • Dry mouth   • Low back pain   • Pinched nerve   • Pulmonary emphysema (CMS/HCC)   • Class 1 obesity due to excess calories without serious comorbidity with body mass index (BMI) of 30.0 to 30.9 in adult   • SOB (shortness of breath)   • Leg swelling   • Hypertension       Social History     Social History   • Marital status:      Social History Main Topics   • Smoking status: Former Smoker     Years: 54.00     Types: Cigars, Electronic Cigarette   • Smokeless tobacco: Never Used      Comment: smokes the ecig as well   • Alcohol use No   • Drug use: No   • Sexual activity: Defer     Other Topics Concern   • Not on file       No Known Allergies    Current Outpatient Prescriptions on File Prior to Visit   Medication Sig Dispense Refill   • aspirin 81 MG chewable tablet Chew 81 mg Daily.     •  "budesonide-formoterol (SYMBICORT) 160-4.5 MCG/ACT inhaler Inhale 2 puffs 2 (Two) Times a Day. 3 inhaler 3   • hydrochlorothiazide (HYDRODIURIL) 25 MG tablet Take 1 tablet by mouth Daily. 30 tablet 5   • ipratropium-albuterol (DUO-NEB) 0.5-2.5 mg/3 ml nebulizer USE 1 VIAL PER NEBULIZER FOUR TIMES DAILY WHILE AWAKE AND EVERY 4 HOURS AS NEEDED FOR SHORTNESS OF BREATH 360 mL 0   • losartan (COZAAR) 100 MG tablet Take 1 tablet by mouth Daily. 30 tablet 5   • Phentermine HCl 15 MG tablet dispersible 1 po daily 30 tablet 1   • [DISCONTINUED] cephalexin (KEFLEX) 500 MG capsule Take 1 capsule by mouth 3 (Three) Times a Day. 30 capsule 0   • [DISCONTINUED] mupirocin (BACTROBAN) 2 % ointment JOESPH EXT AA TID FOR 7 DAYS  0   • [DISCONTINUED] Topiramate ER (TROKENDI XR) 50 MG capsule sustained-release 24 hr 1 po daily 30 capsule 5     No current facility-administered medications on file prior to visit.        Objective   Vitals:    08/14/18 1108   BP: 142/84   BP Location: Left arm   Patient Position: Sitting   Cuff Size: Large Adult   Pulse: 78   Temp: 98.1 °F (36.7 °C)   TempSrc: Oral   SpO2: 98%   Weight: 108 kg (239 lb)   Height: 175.3 cm (69.02\")       Physical Exam   Constitutional: He appears well-developed and well-nourished.   Neck: Neck supple.   Musculoskeletal: He exhibits edema (trace edema;  +varicose veins).   Neurological: He is alert.   Skin: Skin is warm and dry. There is erythema.        Psychiatric: He has a normal mood and affect. His behavior is normal.   Nursing note and vitals reviewed.      Assessment/Plan   Montana was seen today for cellulitis.    Diagnoses and all orders for this visit:    Cellulitis of left lower extremity  -     clindamycin (CLEOCIN) 300 MG capsule; Take 1 capsule by mouth 3 (Three) Times a Day.    Wound infection  -     silver sulfadiazine (SILVADENE, SSD) 1 % cream; Apply  topically to the appropriate area as directed Daily. TO LEG WOUNDS    -if doesn't heal, will send to wound " care  -d/w ID as suggested, but no outpt availability typically only inpatient  -d/w varicose veins - compression hose, low Na diet and elevate legs when able;  Declines vascular (vein clinic) referral, but will consider.        -Follow up: Prn - RTC if worse or no improvement.

## 2018-08-30 DIAGNOSIS — I10 ESSENTIAL HYPERTENSION: ICD-10-CM

## 2018-08-30 DIAGNOSIS — I16.0 HYPERTENSIVE URGENCY: ICD-10-CM

## 2018-08-30 RX ORDER — LOSARTAN POTASSIUM 100 MG/1
100 TABLET ORAL DAILY
Qty: 30 TABLET | Refills: 5 | Status: SHIPPED | OUTPATIENT
Start: 2018-08-30 | End: 2019-05-23 | Stop reason: SDUPTHER

## 2018-08-30 RX ORDER — HYDROCHLOROTHIAZIDE 25 MG/1
25 TABLET ORAL DAILY
Qty: 30 TABLET | Refills: 5 | Status: SHIPPED | OUTPATIENT
Start: 2018-08-30 | End: 2019-04-25 | Stop reason: SDUPTHER

## 2018-09-13 RX ORDER — PHENTERMINE HYDROCHLORIDE 15 MG/1
CAPSULE ORAL
Qty: 30 CAPSULE | Refills: 0 | OUTPATIENT
Start: 2018-09-13

## 2018-09-25 ENCOUNTER — OFFICE VISIT (OUTPATIENT)
Dept: FAMILY MEDICINE CLINIC | Facility: CLINIC | Age: 68
End: 2018-09-25

## 2018-09-25 VITALS
HEART RATE: 88 BPM | SYSTOLIC BLOOD PRESSURE: 135 MMHG | BODY MASS INDEX: 34.98 KG/M2 | OXYGEN SATURATION: 96 % | DIASTOLIC BLOOD PRESSURE: 85 MMHG | WEIGHT: 237 LBS | TEMPERATURE: 98.3 F

## 2018-09-25 DIAGNOSIS — R06.02 SHORTNESS OF BREATH: ICD-10-CM

## 2018-09-25 DIAGNOSIS — Z00.00 MEDICARE ANNUAL WELLNESS VISIT, INITIAL: Primary | ICD-10-CM

## 2018-09-25 DIAGNOSIS — I83.029 VENOUS STASIS ULCER OF LEFT LOWER EXTREMITY (HCC): ICD-10-CM

## 2018-09-25 DIAGNOSIS — J44.1 CHRONIC OBSTRUCTIVE PULMONARY DISEASE WITH ACUTE EXACERBATION (HCC): ICD-10-CM

## 2018-09-25 DIAGNOSIS — E66.09 CLASS 1 OBESITY DUE TO EXCESS CALORIES WITHOUT SERIOUS COMORBIDITY WITH BODY MASS INDEX (BMI) OF 30.0 TO 30.9 IN ADULT: ICD-10-CM

## 2018-09-25 DIAGNOSIS — I10 ESSENTIAL HYPERTENSION: ICD-10-CM

## 2018-09-25 DIAGNOSIS — L97.929 VENOUS STASIS ULCER OF LEFT LOWER EXTREMITY (HCC): ICD-10-CM

## 2018-09-25 DIAGNOSIS — J43.8 OTHER EMPHYSEMA (HCC): ICD-10-CM

## 2018-09-25 PROCEDURE — G0438 PPPS, INITIAL VISIT: HCPCS | Performed by: FAMILY MEDICINE

## 2018-09-25 RX ORDER — BUDESONIDE AND FORMOTEROL FUMARATE DIHYDRATE 160; 4.5 UG/1; UG/1
2 AEROSOL RESPIRATORY (INHALATION)
Qty: 3 INHALER | Refills: 3 | Status: SHIPPED | OUTPATIENT
Start: 2018-09-25 | End: 2019-10-23 | Stop reason: SDUPTHER

## 2018-09-25 NOTE — PROGRESS NOTES
QUICK REFERENCE INFORMATION:  The ABCs of the Annual Wellness Visit    Initial Medicare Wellness Visit    HEALTH RISK ASSESSMENT    1950    Recent Hospitalizations:  No hospitalization(s) within the last year..        Current Medical Providers:  Patient Care Team:  Kike Frost DO as PCP - General (Family Medicine)  Darren Pickard MD as PCP - Claims Attributed        Smoking Status:  History   Smoking Status   • Former Smoker   • Years: 54.00   • Types: Cigars, Electronic Cigarette   Smokeless Tobacco   • Never Used     Comment: smokes the ecig as well       Alcohol Consumption:  History   Alcohol Use No       Depression Screen:   PHQ-2/PHQ-9 Depression Screening 9/25/2018   Little interest or pleasure in doing things 0   Feeling down, depressed, or hopeless 0   Trouble falling or staying asleep, or sleeping too much 0   Feeling tired or having little energy 0   Poor appetite or overeating 0   Feeling bad about yourself - or that you are a failure or have let yourself or your family down 0   Trouble concentrating on things, such as reading the newspaper or watching television 0   Moving or speaking so slowly that other people could have noticed. Or the opposite - being so fidgety or restless that you have been moving around a lot more than usual 0   Thoughts that you would be better off dead, or of hurting yourself in some way 0   Total Score 0       Health Habits and Functional and Cognitive Screening:  Functional & Cognitive Status 9/25/2018   Do you have difficulty preparing food and eating? No   Do you have difficulty bathing yourself, getting dressed or grooming yourself? No   Do you have difficulty using the toilet? Yes   Do you have difficulty moving around from place to place? No   Do you have trouble with steps or getting out of a bed or a chair? No   In the past year have you fallen or experienced a near fall? No   Current Diet Low Fat Diet   Dental Exam Up to date   Eye Exam Not up to  date   Exercise (times per week) 0 times per week   Current Exercise Activities Include None   Do you need help using the phone?  No   Are you deaf or do you have serious difficulty hearing?  No   Do you need help with transportation? No   Do you need help shopping? No   Do you need help preparing meals?  No   Do you need help with housework?  No   Do you need help with laundry? No   Do you need help taking your medications? No   Do you need help managing money? No   Do you ever drive or ride in a car without wearing a seat belt? No   Have you felt unusual stress, anger or loneliness in the last month? No   Who do you live with? Spouse   If you need help, do you have trouble finding someone available to you? No   Have you been bothered in the last four weeks by sexual problems? No   Do you have difficulty concentrating, remembering or making decisions? No           Does the patient have evidence of cognitive impairment? No    Asiprin use counseling: Taking ASA appropriately as indicated      Recent Lab Results:    Visual Acuity:  No exam data present    Age-appropriate Screening Schedule:  Refer to the list below for future screening recommendations based on patient's age, sex and/or medical conditions. Orders for these recommended tests are listed in the plan section. The patient has been provided with a written plan.    Health Maintenance   Topic Date Due   • TDAP/TD VACCINES (1 - Tdap) 10/11/1970   • PNEUMOCOCCAL VACCINES (65+ LOW/MEDIUM RISK) (1 of 2 - PCV13) 10/11/2016   • ZOSTER VACCINE (2 of 2) 03/06/2018   • COLONOSCOPY  01/09/2028   • INFLUENZA VACCINE  Addressed        Subjective   History of Present Illness    Montana Franco is a 67 y.o. male who presents for an Annual Wellness Visit.  Patient with artieral htn, last check controlled;  WOrking on weight loss;  Down over 30 lbs;  Has COPD, stable and feels breathing better already.  Planning to start exercising to get past plateau.  Did start phentermine and  helping;  Has very high stress job and buried in work.  Also wife with cirrhosis and struggling with hepatic encephalopathy.      The following portions of the patient's history were reviewed and updated as appropriate: allergies, current medications, past family history, past medical history, past social history, past surgical history and problem list.    Outpatient Medications Prior to Visit   Medication Sig Dispense Refill   • aspirin 81 MG chewable tablet Chew 81 mg Daily.     • budesonide-formoterol (SYMBICORT) 160-4.5 MCG/ACT inhaler Inhale 2 puffs 2 (Two) Times a Day. 3 inhaler 3   • clindamycin (CLEOCIN) 300 MG capsule Take 1 capsule by mouth 3 (Three) Times a Day. 30 capsule 0   • hydrochlorothiazide (HYDRODIURIL) 25 MG tablet TAKE 1 TABLET BY MOUTH DAILY 30 tablet 5   • ipratropium-albuterol (DUO-NEB) 0.5-2.5 mg/3 ml nebulizer USE 1 VIAL PER NEBULIZER FOUR TIMES DAILY WHILE AWAKE AND EVERY 4 HOURS AS NEEDED FOR SHORTNESS OF BREATH 360 mL 0   • losartan (COZAAR) 100 MG tablet TAKE 1 TABLET BY MOUTH DAILY 30 tablet 5   • Phentermine HCl 15 MG tablet dispersible 1 po daily 30 tablet 1   • silver sulfadiazine (SILVADENE, SSD) 1 % cream Apply  topically to the appropriate area as directed Daily. TO LEG WOUNDS 50 g 0     No facility-administered medications prior to visit.        Patient Active Problem List   Diagnosis   • Dry eyes   • Dry mouth   • Low back pain   • Pinched nerve   • Pulmonary emphysema (CMS/HCC)   • Class 1 obesity due to excess calories without serious comorbidity with body mass index (BMI) of 30.0 to 30.9 in adult   • SOB (shortness of breath)   • Leg swelling   • Hypertension       Advance Care Planning:  has an advance directive - a copy HAS NOT been provided. Have asked the patient to send this to us to add to record.    Identification of Risk Factors:  Risk factors include: weight .    Review of Systems   Respiratory: Negative for shortness of breath.    Cardiovascular: Negative for  chest pain.   Gastrointestinal: Negative for abdominal pain, nausea and vomiting.   Neurological: Negative for syncope.       Compared to one year ago, the patient feels his physical health is the same.  Compared to one year ago, the patient feels his mental health is the same.    Objective     Physical Exam   Constitutional: He appears well-developed and well-nourished.   HENT:   Head: Normocephalic and atraumatic.   Neck: Neck supple. No JVD present. No thyromegaly present.   Cardiovascular: Normal rate, regular rhythm and normal heart sounds.  Exam reveals no gallop and no friction rub.    No murmur heard.  Pulmonary/Chest: Effort normal. No respiratory distress. He has decreased breath sounds. He has no wheezes. He has no rales.   Abdominal: Soft. Bowel sounds are normal. He exhibits no distension. There is no tenderness. There is no rebound and no guarding.   Musculoskeletal: He exhibits no edema.   Neurological: He is alert.   Skin: Skin is warm and dry. Capillary refill takes less than 2 seconds.   Left lower leg 1.5cm superficial ulceration;  Some erythema, but no warmth or tenderness;  Has varicose veins and edema.   Psychiatric: He has a normal mood and affect. His behavior is normal.   Nursing note and vitals reviewed.      Vitals:    09/25/18 1420 09/25/18 1456   BP: 170/80 135/85   Pulse: 88    Temp: 98.3 °F (36.8 °C)    SpO2: 96%    Weight: 108 kg (237 lb)        Patient's Body mass index is 34.98 kg/m². BMI is above normal parameters. Recommendations include: educational material.      Assessment/Plan   Patient Self-Management and Personalized Health Advice  The patient has been provided with information about: diet and exercise and preventive services including:   · Exercise counseling provided.    Visit Diagnoses:    ICD-10-CM ICD-9-CM   1. Medicare annual wellness visit, initial Z00.00 V70.0   2. Class 1 obesity due to excess calories without serious comorbidity with body mass index (BMI) of 30.0  to 30.9 in adult E66.09 278.00    Z68.30 V85.30   3. Other emphysema (CMS/[facility]) J43.8 492.8   4. Essential hypertension I10 401.9   5. Venous stasis ulcer of left lower extremity (CMS/[facility]) I83.029 454.0       No orders of the defined types were placed in this encounter.      Outpatient Encounter Prescriptions as of 9/25/2018   Medication Sig Dispense Refill   • aspirin 81 MG chewable tablet Chew 81 mg Daily.     • budesonide-formoterol (SYMBICORT) 160-4.5 MCG/ACT inhaler Inhale 2 puffs 2 (Two) Times a Day. 3 inhaler 3   • clindamycin (CLEOCIN) 300 MG capsule Take 1 capsule by mouth 3 (Three) Times a Day. 30 capsule 0   • hydrochlorothiazide (HYDRODIURIL) 25 MG tablet TAKE 1 TABLET BY MOUTH DAILY 30 tablet 5   • ipratropium-albuterol (DUO-NEB) 0.5-2.5 mg/3 ml nebulizer USE 1 VIAL PER NEBULIZER FOUR TIMES DAILY WHILE AWAKE AND EVERY 4 HOURS AS NEEDED FOR SHORTNESS OF BREATH 360 mL 0   • losartan (COZAAR) 100 MG tablet TAKE 1 TABLET BY MOUTH DAILY 30 tablet 5   • Phentermine HCl 15 MG tablet dispersible 1 po daily 30 tablet 1   • silver sulfadiazine (SILVADENE, SSD) 1 % cream Apply  topically to the appropriate area as directed Daily. TO LEG WOUNDS 50 g 0     No facility-administered encounter medications on file as of 9/25/2018.        Reviewed use of high risk medication in the elderly: yes  Reviewed for potential of harmful drug interactions in the elderly: yes    Follow Up:  Return in about 3 months (around 12/25/2018), or if symptoms worsen or fail to improve.     An After Visit Summary and PPPS with all of these plans were given to the patient.      Declines flu  Declines pneumococcal, but will consider;  I wrote down names, welcome to walk-in for or get at pharmacy.  For leg try jobst stockings;  Restart bactroban;  Elevate legs;  Seek care if worse;  WIll hold abx.  Encouraged to exercise regularly

## 2018-09-25 NOTE — PATIENT INSTRUCTIONS
Medicare Wellness  Personal Prevention Plan of Service     Date of Office Visit:  2018  Encounter Provider:  Kike Frost DO  Place of Service:  [facility]  Patient Name: [place] Franco  :  1950    As part of the Medicare Wellness portion of your visit today, we are providing you with this personalized preventive plan of services (PPPS). This plan is based upon recommendations of the [place] Preventive Services Task Force (USPSTF) and the Advisory Committee on Immunization Practices (ACIP).    This lists the preventive care services that should be considered, and provides dates of when you are due. Items listed as completed are up-to-date and do not require any further intervention.    Health Maintenance   Topic Date Due   • TDAP/TD VACCINES (1 - Tdap) 10/11/1970   • PNEUMOCOCCAL VACCINES (65+ LOW/MEDIUM RISK) (1 of 2 - PCV13) 10/11/2016   • ZOSTER VACCINE (2 of 2) 2018   • MEDICARE ANNUAL WELLNESS  2019   • COLONOSCOPY  2028   • HEPATITIS C SCREENING  Addressed   • INFLUENZA VACCINE  Addressed       No orders of the defined types were placed in this encounter.      Return if symptoms worsen or fail to improve.      Chronic Obstructive Pulmonary Disease  Chronic obstructive pulmonary disease (COPD) is a long-term (chronic) condition that affects the lungs. COPD is a general term that can be used to describe many different lung problems that cause lung swelling (inflammation) and limit airflow, including chronic bronchitis and emphysema. If you have COPD, your lung function will probably never return to normal. In most cases, it gets worse over time. However, there are steps you can take to slow the progression of the disease and improve your quality of life.  What are the causes?  This condition may be caused by:  · Smoking. This is the most common cause.  · Certain genes passed down through families.    What increases the risk?  The  following factors may make you more likely to develop this condition:  · Secondhand smoke from cigarettes, pipes, or cigars.  · Exposure to chemicals and other irritants such as fumes and dust in the work environment.  · Chronic lung conditions or infections.    What are the signs or symptoms?  Symptoms of this condition include:  · Shortness of breath, especially during physical activity.  · Chronic cough with a large amount of thick mucus. Sometimes the cough may not have any mucus (dry cough).  · Wheezing.  · Rapid breaths.  · Gray or bluish discoloration (cyanosis) of the skin, especially in your fingers, toes, or lips.  · Feeling tired (fatigue).  · Weight loss.  · Chest tightness.  · Frequent infections.  · Episodes when breathing symptoms become much worse (exacerbations).  · Swelling in the ankles, feet, or legs. This may occur in later stages of the disease.    How is this diagnosed?  This condition is diagnosed based on:  · Your medical history.  · A physical exam.    You may also have tests, including:  · Lung (pulmonary) function tests. This may include a spirometry test, which measures your ability to exhale properly.  · Chest X-ray.  · CT scan.  · Blood tests.    How is this treated?  This condition may be treated with:  · Medicines. These may include inhaled rescue medicines to treat acute exacerbations as well as long-term, or maintenance, medicines to prevent flare-ups of COPD.  ? Bronchodilators help treat COPD by dilating the airways to allow increased airflow and make your breathing more comfortable.  ? Steroids can reduce airway inflammation and help prevent exacerbations.  · Smoking cessation. If you smoke, your health care provider may ask you to quit, and may also recommend therapy or replacement products to help you quit.  · Pulmonary rehabilitation. This may involve working with a team of health care providers and specialists, such as respiratory, occupational, and physical  therapists.  · Exercise and physical activity. These are beneficial for nearly all people with COPD.  · Nutrition therapy to gain weight, if you are underweight.  · Oxygen. Supplemental oxygen therapy is only helpful if you have a low oxygen level in your blood (hypoxemia).  · Lung surgery or transplant.  · Palliative care. This is to help people with COPD feel comfortable when treatment is no longer working.    Follow these instructions at home:  Medicines  · Take over-the-counter and prescription medicines (inhaled or pills) only as told by your health care provider.  · Talk to your health care provider before taking any cough or allergy medicines. You may need to avoid certain medicines that dry out your airways.  Lifestyle  · If you are a smoker, the most important thing that you can do is to stop smoking. Do not use any products that contain nicotine or tobacco, such as cigarettes and e-cigarettes. If you need help quitting, ask your health care provider. Continuing to smoke will cause the disease to progress faster.  · Avoid exposure to things that irritate your lungs, such as smoke, chemicals, and fumes.  · Stay active, but balance activity with periods of rest. Exercise and physical activity will help you maintain your ability to do things you want to do.  · Learn and use relaxation techniques to manage stress and to control your breathing.  · Get the right amount of sleep and get quality sleep. Most adults need 7 or more hours per night.  · Eat healthy foods. Eating smaller, more frequent meals and resting before meals may help you maintain your strength.  Controlled breathing  Learn and use controlled breathing techniques as directed by your health care provider. Controlled breathing techniques include:  · Pursed lip breathing. Start by breathing in (inhaling) through your nose for 1 second. Then, purse your lips as if you were going to whistle and breathe out (exhale) through the pursed lips for 2  seconds.  · Diaphragmatic breathing. Start by putting one hand on your abdomen just above your waist. Inhale slowly through your nose. The hand on your abdomen should move out. Then purse your lips and exhale slowly. You should be able to feel the hand on your abdomen moving in as you exhale.    Controlled coughing  Learn and use controlled coughing to clear mucus from your lungs. Controlled coughing is a series of short, progressive coughs. The steps of controlled coughing are:  1. Lean your head slightly forward.  2. Breathe in deeply using diaphragmatic breathing.  3. Try to hold your breath for 3 seconds.  4. Keep your mouth slightly open while coughing twice.  5. Spit any mucus out into a tissue.  6. Rest and repeat the steps once or twice as needed.    General instructions  · Make sure you receive all the vaccines that your health care provider recommends, especially the pneumococcal and influenza vaccines. Preventing infection and hospitalization is very important when you have COPD.  · Use oxygen therapy and pulmonary rehabilitation if directed to by your health care provider. If you require home oxygen therapy, ask your health care provider whether you should purchase a pulse oximeter to measure your oxygen level at home.  · Work with your health care provider to develop a COPD action plan. This will help you know what steps to take if your condition gets worse.  · Keep other chronic health conditions under control as told by your health care provider.  · Avoid extreme temperature and humidity changes.  · Avoid contact with people who have an illness that spreads from person to person (is contagious), such as viral infections or pneumonia.  · Keep all follow-up visits as told by your health care provider. This is important.  Contact a health care provider if:  · You are coughing up more mucus than usual.  · There is a change in the color or thickness of your mucus.  · Your breathing is more labored than  usual.  · Your breathing is faster than usual.  · You have difficulty sleeping.  · You need to use your rescue medicines or inhalers more often than expected.  · You have trouble doing routine activities such as getting dressed or walking around the house.  Get help right away if:  · You have shortness of breath while you are resting.  · You have shortness of breath that prevents you from:  ? Being able to talk.  ? Performing your usual physical activities.  · You have chest pain lasting longer than 5 minutes.  · Your skin color is more blue (cyanotic) than usual.  · You measure low oxygen saturations for longer than 5 minutes with a pulse oximeter.  · You have a fever.  · You feel too tired to breathe normally.  Summary  · Chronic obstructive pulmonary disease (COPD) is a long-term (chronic) condition that affects the lungs.  · Your lung function will probably never return to normal. In most cases, it gets worse over time. However, there are steps you can take to slow the progression of the disease and improve your quality of life.  · Treatment for COPD may include taking medicines, quitting smoking, pulmonary rehabilitation, and changes to diet and exercise. As the disease progresses, you may need oxygen therapy, a lung transplant, or palliative care.  · To help manage your condition, do not smoke, avoid exposure to things that irritate your lungs, stay up to date on all vaccines, and follow your health care provider's instructions for taking medicines.  This information is not intended to replace advice given to you by your health care provider. Make sure you discuss any questions you have with your health care provider.  Document Released: 09/27/2006 Document Revised: 01/22/2018 Document Reviewed: 01/22/2018  [employer] Interactive Patient Education © 2018 [employer]    Advance Directive  Advance directives are legal documents that let you make choices ahead of time about your health care and medical treatment  in case you become unable to communicate for yourself. Advance directives are a way for you to communicate your wishes to family, friends, and health care providers. This can help convey your decisions about end-of-life care if you become unable to communicate.  Discussing and writing advance directives should happen over time rather than all at once. Advance directives can be changed depending on your situation and what you want, even after you have signed the advance directives.  If you do not have an advance directive, some states assign family decision makers to act on your behalf based on how closely you are related to them. Each state has its own laws regarding advance directives. You may want to check with your health care provider, , or state representative about the laws in your state. There are different types of advance directives, such as:  · Medical power of .  · Living will.  · Do not resuscitate (DNR) or do not attempt resuscitation (DNAR) order.    Health care proxy and medical power of   A health care proxy, also called a health care agent, is a person who is appointed to make medical decisions for you in cases in which you are unable to make the decisions yourself. Generally, people choose someone they know well and trust to represent their preferences. Make sure to ask this person for an agreement to act as your proxy. A proxy may have to exercise judgment in the event of a medical decision for which your wishes are not known.  A medical power of  is a legal document that names your health care proxy. Depending on the laws in your state, after the document is written, it may also need to be:  · Signed.  · Notarized.  · Dated.  · Copied.  · Witnessed.  · Incorporated into your medical record.    You may also want to appoint someone to manage your financial affairs in a situation in which you are unable to do so. This is called a durable power of  for  finances. It is a separate legal document from the durable power of  for health care. You may choose the same person or someone different from your health care proxy to act as your agent in financial matters.  If you do not appoint a proxy, or if there is a concern that the proxy is not acting in your best interests, a court-appointed guardian may be designated to act on your behalf.  Living will  A living will is a set of instructions documenting your wishes about medical care when you cannot express them yourself. Health care providers should keep a copy of your living will in your medical record. You may want to give a copy to family members or friends. To alert caregivers in case of an emergency, you can place a card in your wallet to let them know that you have a living will and where they can find it. A living will is used if you become:  · Terminally ill.  · Incapacitated.  · Unable to communicate or make decisions.    Items to consider in your living will include:  · The use or non-use of life-sustaining equipment, such as dialysis machines and breathing machines (ventilators).  · A DNR or DNAR order, which is the instruction not to use cardiopulmonary resuscitation (CPR) if breathing or heartbeat stops.  · The use or non-use of tube feeding.  · Withholding of food and fluids.  · Comfort (palliative) care when the goal becomes comfort rather than a cure.  · Organ and tissue donation.    A living will does not give instructions for distributing your money and property if you should pass away. It is recommended that you seek the advice of a  when writing a will. Decisions about taxes, beneficiaries, and asset distribution will be legally binding. This process can relieve your family and friends of any concerns surrounding disputes or questions that may come up about the distribution of your assets.  DNR or DNAR  A DNR or DNAR order is a request not to have CPR in the event that your heart stops  beating or you stop breathing. If a DNR or DNAR order has not been made and shared, a health care provider will try to help any patient whose heart has stopped or who has stopped breathing. If you plan to have surgery, talk with your health care provider about how your DNR or DNAR order will be followed if problems occur.  Summary  · Advance directives are the legal documents that allow you to make choices ahead of time about your health care and medical treatment in case you become unable to communicate for yourself.  · The process of discussing and writing advance directives should happen over time. You can change the advance directives, even after you have signed them.  · Advance directives include DNR or DNAR orders, living deleon, and designating an agent as your medical power of .  This information is not intended to replace advice given to you by your health care provider. Make sure you discuss any questions you have with your health care provider.  Document Released: 03/26/2009 Document Revised: 11/06/2017 Document Reviewed: 11/06/2017  [employer] Interactive Patient Education © 2017 [employer]    Exercising to Lose Weight  Exercising can help you to lose weight. In order to lose weight through exercise, you need to do vigorous-intensity exercise. You can tell that you are exercising with vigorous intensity if you are breathing very hard and fast and cannot hold a conversation while exercising.  Moderate-intensity exercise helps to maintain your current weight. You can tell that you are exercising at a moderate level if you have a higher heart rate and faster breathing, but you are still able to hold a conversation.  How often should I exercise?  Choose an activity that you enjoy and set realistic goals. Your health care provider can help you to make an activity plan that works for you. Exercise regularly as directed by your health care provider. This may include:  · Doing resistance training twice  each week, such as:  ? Push-ups.  ? Sit-ups.  ? Lifting weights.  ? Using resistance bands.  · Doing a given intensity of exercise for a given amount of time. Choose from these options:  ? 150 minutes of moderate-intensity exercise every week.  ? 75 minutes of vigorous-intensity exercise every week.  ? A mix of moderate-intensity and vigorous-intensity exercise every week.    Children, pregnant women, people who are out of shape, people who are overweight, and older adults may need to consult a health care provider for individual recommendations. If you have any sort of medical condition, be sure to consult your health care provider before starting a new exercise program.  What are some activities that can help me to lose weight?  · Walking at a rate of at least 4.5 miles an hour.  · Jogging or running at a rate of 5 miles per hour.  · Biking at a rate of at least 10 miles per hour.  · Lap swimming.  · Roller-skating or in-line skating.  · Cross-country skiing.  · Vigorous competitive sports, such as football, basketball, and soccer.  · Jumping rope.  · Aerobic dancing.  How can I be more active in my day-to-day activities?  · Use the stairs instead of the elevator.  · Take a walk during your lunch break.  · If you drive, park your car farther away from work or school.  · If you take public transportation, get off one stop early and walk the rest of the way.  · Make all of your phone calls while standing up and walking around.  · Get up, stretch, and walk around every 30 minutes throughout the day.  What guidelines should I follow while exercising?  · Do not exercise so much that you hurt yourself, feel dizzy, or get very short of breath.  · Consult your health care provider prior to starting a new exercise program.  · Wear comfortable clothes and shoes with good support.  · Drink plenty of water while you exercise to prevent dehydration or heat stroke. Body water is lost during exercise and must be  replaced.  · Work out until you breathe faster and your heart beats faster.  This information is not intended to replace advice given to you by your health care provider. Make sure you discuss any questions you have with your health care provider.  Document Released: 01/20/2012 Document Revised: 05/25/2017 Document Reviewed: 05/21/2015  [employer] Interactive Patient Education © 2018 [employer]    Calorie Counting for Weight Loss  Calories are units of energy. Your body needs a certain amount of calories from food to keep you going throughout the day. When you eat more calories than your body needs, your body stores the extra calories as fat. When you eat fewer calories than your body needs, your body burns fat to get the energy it needs.  Calorie counting means keeping track of how many calories you eat and drink each day. Calorie counting can be helpful if you need to lose weight. If you make sure to eat fewer calories than your body needs, you should lose weight. Ask your health care provider what a healthy weight is for you.  For calorie counting to work, you will need to eat the right number of calories in a day in order to lose a healthy amount of weight per week. A dietitian can help you determine how many calories you need in a day and will give you suggestions on how to reach your calorie goal.  · A healthy amount of weight to lose per week is usually 1-2 lb (0.5-0.9 kg). This usually means that your daily calorie intake should be reduced by 500-750 calories.  · Eating 1,200 - 1,500 calories per day can help most women lose weight.  · Eating 1,500 - 1,800 calories per day can help most men lose weight.    What do I need to know about calorie counting?  In order to meet your daily calorie goal, you will need to:  · Find out how many calories are in each food you would like to eat. Try to do this before you eat.  · Decide how much of the food you plan to eat.  · Write down what you ate and how many calories  it had. Doing this is called keeping a food log.    To successfully lose weight, it is important to balance calorie counting with a healthy lifestyle that includes regular activity. Aim for 150 minutes of moderate exercise (such as walking) or 75 minutes of vigorous exercise (such as running) each week.  Where do I find calorie information?    The number of calories in a food can be found on a Nutrition Facts label. If a food does not have a Nutrition Facts label, try to look up the calories online or ask your dietitian for help.  Remember that calories are listed per serving. If you choose to have more than one serving of a food, you will have to multiply the calories per serving by the amount of servings you plan to eat. For example, the label on a package of bread might say that a serving size is 1 slice and that there are 90 calories in a serving. If you eat 1 slice, you will have eaten 90 calories. If you eat 2 slices, you will have eaten 180 calories.  How do I keep a food log?  Immediately after each meal, record the following information in your food log:  · What you ate. Don't forget to include toppings, sauces, and other extras on the food.  · How much you ate. This can be measured in cups, ounces, or number of items.  · How many calories each food and drink had.  · The total number of calories in the meal.    Keep your food log near you, such as in a small notebook in your pocket, or use a mobile neris or website. Some programs will calculate calories for you and show you how many calories you have left for the day to meet your goal.  What are some calorie counting tips?  · Use your calories on foods and drinks that will fill you up and not leave you hungry:  ? Some examples of foods that fill you up are nuts and nut butters, vegetables, lean proteins, and high-fiber foods like whole grains. High-fiber foods are foods with more than 5 g fiber per serving.  ? Drinks such as sodas, specialty coffee drinks,  "alcohol, and juices have a lot of calories, yet do not fill you up.  · Eat nutritious foods and avoid empty calories. Empty calories are calories you get from foods or beverages that do not have many vitamins or protein, such as candy, sweets, and soda. It is better to have a nutritious high-calorie food (such as an avocado) than a food with few nutrients (such as a bag of chips).  · Know how many calories are in the foods you eat most often. This will help you calculate calorie counts faster.  · Pay attention to calories in drinks. Low-calorie drinks include water and unsweetened drinks.  · Pay attention to nutrition labels for \"low fat\" or \"fat free\" foods. These foods sometimes have the same amount of calories or more calories than the full fat versions. They also often have added sugar, starch, or salt, to make up for flavor that was removed with the fat.  · Find a way of tracking calories that works for you. Get creative. Try different apps or programs if writing down calories does not work for you.  What are some portion control tips?  · Know how many calories are in a serving. This will help you know how many servings of a certain food you can have.  · Use a measuring cup to measure serving sizes. You could also try weighing out portions on a kitchen scale. With time, you will be able to estimate serving sizes for some foods.  · Take some time to put servings of different foods on your favorite plates, bowls, and cups so you know what a serving looks like.  · Try not to eat straight from a bag or box. Doing this can lead to overeating. Put the amount you would like to eat in a cup or on a plate to make sure you are eating the right portion.  · Use smaller plates, glasses, and bowls to prevent overeating.  · Try not to multitask (for example, watch TV or use your computer) while eating. If it is time to eat, sit down at a table and enjoy your food. This will help you to know when you are full. It will also " help you to be aware of what you are eating and how much you are eating.  What are tips for following this plan?  Reading food labels  · Check the calorie count compared to the serving size. The serving size may be smaller than what you are used to eating.  · Check the source of the calories. Make sure the food you are eating is high in vitamins and protein and low in saturated and trans fats.  Shopping  · Read nutrition labels while you shop. This will help you make healthy decisions before you decide to purchase your food.  · Make a grocery list and stick to it.  Cooking  · Try to cook your favorite foods in a healthier way. For example, try baking instead of frying.  · Use low-fat dairy products.  Meal planning  · Use more fruits and vegetables. Half of your plate should be fruits and vegetables.  · Include lean proteins like poultry and fish.  How do I count calories when eating out?  · Ask for smaller portion sizes.  · Consider sharing an entree and sides instead of getting your own entree.  · If you get your own entree, eat only half. Ask for a box at the beginning of your meal and put the rest of your entree in it so you are not tempted to eat it.  · If calories are listed on the menu, choose the lower calorie options.  · Choose dishes that include vegetables, fruits, whole grains, low-fat dairy products, and lean protein.  · Choose items that are boiled, broiled, grilled, or steamed. Stay away from items that are buttered, battered, fried, or served with cream sauce. Items labeled “crispy” are usually fried, unless stated otherwise.  · Choose water, low-fat milk, unsweetened iced tea, or other drinks without added sugar. If you want an alcoholic beverage, choose a lower calorie option such as a glass of wine or light beer.  · Ask for dressings, sauces, and syrups on the side. These are usually high in calories, so you should limit the amount you eat.  · If you want a salad, choose a garden salad and ask for  grilled meats. Avoid extra toppings like sykes, cheese, or fried items. Ask for the dressing on the side, or ask for olive oil and vinegar or lemon to use as dressing.  · Estimate how many servings of a food you are given. For example, a serving of cooked rice is ½ cup or about the size of half a baseball. Knowing serving sizes will help you be aware of how much food you are eating at restaurants. The list below tells you how big or small some common portion sizes are based on everyday objects:  ? 1 oz--4 stacked dice.  ? 3 oz--1 deck of cards.  ? 1 tsp--1 die.  ? 1 Tbsp--½ a ping-pong ball.  ? 2 Tbsp--1 ping-pong ball.  ? ½ cup--½ baseball.  ? 1 cup--1 baseball.  Summary  · Calorie counting means keeping track of how many calories you eat and drink each day. If you eat fewer calories than your body needs, you should lose weight.  · A healthy amount of weight to lose per week is usually 1-2 lb (0.5-0.9 kg). This usually means reducing your daily calorie intake by 500-750 calories.  · The number of calories in a food can be found on a Nutrition Facts label. If a food does not have a Nutrition Facts label, try to look up the calories online or ask your dietitian for help.  · Use your calories on foods and drinks that will fill you up, and not on foods and drinks that will leave you hungry.  · Use smaller plates, glasses, and bowls to prevent overeating.  This information is not intended to replace advice given to you by your health care provider. Make sure you discuss any questions you have with your health care provider.  Document Released: 12/18/2006 Document Revised: 11/17/2017 Document Reviewed: 11/17/2017  Elsevier Interactive Patient Education © 2018 [employer]      Varicose Veins  Varicose veins are veins that have become enlarged and twisted. They are usually seen in the legs but can occur in other parts of the body as well.  What are the causes?  This condition is the result of valves in the veins not  working properly. Valves in the veins help to return blood from the leg to the heart. If these valves are damaged, blood flows backward and backs up into the veins in the leg near the skin. This causes the veins to become larger.  What increases the risk?  People who are on their feet a lot, who are pregnant, or who are overweight are more likely to develop varicose veins.  What are the signs or symptoms?  · Bulging, twisted-appearing, bluish veins, most commonly found on the legs.  · Leg pain or a feeling of heaviness. These symptoms may be worse at the end of the day.  · Leg swelling.  · Changes in skin color.  How is this diagnosed?  A health care provider can usually diagnose varicose veins by examining your legs. Your health care provider may also recommend an ultrasound of your leg veins.  How is this treated?  Most varicose veins can be treated at home. However, other treatments are available for people who have persistent symptoms or want to improve the cosmetic appearance of the varicose veins. These treatment options include:  · Sclerotherapy. A solution is injected into the vein to close it off.  · Laser treatment. A laser is used to heat the vein to close it off.  · Radiofrequency vein ablation. An electrical current produced by radio waves is used to close off the vein.  · Phlebectomy. The vein is surgically removed through small incisions made over the varicose vein.  · Vein ligation and stripping. The vein is surgically removed through incisions made over the varicose vein after the vein has been tied (ligated).    Follow these instructions at home:  · Do not stand or sit in one position for long periods of time. Do not sit with your legs crossed. Rest with your legs raised during the day.  · Wear compression stockings as directed by your health care provider. These stockings help to prevent blood clots and reduce swelling in your legs.  · Do not wear other tight, encircling garments around your legs,  pelvis, or waist.  · Walk as much as possible to increase blood flow.  · Raise the foot of your bed at night with 2-inch blocks.  · If you get a cut in the skin over the vein and the vein bleeds, lie down with your leg raised and press on it with a clean cloth until the bleeding stops. Then place a bandage (dressing) on the cut. See your health care provider if it continues to bleed.  Contact a health care provider if:  · The skin around your ankle starts to break down.  · You have pain, redness, tenderness, or hard swelling in your leg over a vein.  · You are uncomfortable because of leg pain.  This information is not intended to replace advice given to you by your health care provider. Make sure you discuss any questions you have with your health care provider.  Document Released: 09/27/2006 Document Revised: 05/25/2017 Document Reviewed: 06/20/2017  [employer] Interactive Patient Education © 2017 [employer].  Pneumococcal Conjugate Vaccine suspension for injection  What is this medicine?  PNEUMOCOCCAL VACCINE (JOHN mo LIOR al vak SEEN) is a vaccine used to prevent pneumococcus bacterial infections. These bacteria can cause serious infections like pneumonia, meningitis, and blood infections. This vaccine will lower your chance of getting pneumonia. If you do get pneumonia, it can make your symptoms milder and your illness shorter. This vaccine will not treat an infection and will not cause infection. This vaccine is recommended for infants and young children, adults with certain medical conditions, and adults 65 years or older.  This medicine may be used for other purposes; ask your health care provider or pharmacist if you have questions.  COMMON BRAND NAME(S): Prevnar, Prevnar 13  What should I tell my health care provider before I take this medicine?  They need to know if you have any of these conditions:  -bleeding problems  -fever  -immune system problems  -an unusual or allergic reaction to pneumococcal  vaccine, diphtheria toxoid, other vaccines, latex, other medicines, foods, dyes, or preservatives  -pregnant or trying to get pregnant  -breast-feeding  How should I use this medicine?  This vaccine is for injection into a muscle. It is given by a health care professional.  A copy of Vaccine Information Statements will be given before each vaccination. Read this sheet carefully each time. The sheet may change frequently.  Talk to your pediatrician regarding the use of this medicine in children. While this drug may be prescribed for children as young as 6 weeks old for selected conditions, precautions do apply.  Overdosage: If you think you have taken too much of this medicine contact a poison control center or emergency room at once.  NOTE: This medicine is only for you. Do not share this medicine with others.  What if I miss a dose?  It is important not to miss your dose. Call your doctor or health care professional if you are unable to keep an appointment.  What may interact with this medicine?  -medicines for cancer chemotherapy  -medicines that suppress your immune function  -steroid medicines like prednisone or cortisone  This list may not describe all possible interactions. Give your health care provider a list of all the medicines, herbs, non-prescription drugs, or dietary supplements you use. Also tell them if you smoke, drink alcohol, or use illegal drugs. Some items may interact with your medicine.  What should I watch for while using this medicine?  Mild fever and pain should go away in 3 days or less. Report any unusual symptoms to your doctor or health care professional.  What side effects may I notice from receiving this medicine?  Side effects that you should report to your doctor or health care professional as soon as possible:  -allergic reactions like skin rash, itching or hives, swelling of the face, lips, or tongue  -breathing problems  -confused  -fast or irregular heartbeat  -fever over 102  degrees F  -seizures  -unusual bleeding or bruising  -unusual muscle weakness  Side effects that usually do not require medical attention (report to your doctor or health care professional if they continue or are bothersome):  -aches and pains  -diarrhea  -fever of 102 degrees F or less  -headache  -irritable  -loss of appetite  -pain, tender at site where injected  -trouble sleeping  This list may not describe all possible side effects. Call your doctor for medical advice about side effects. You may report side effects to FDA at 1-962-FDA-1498.  Where should I keep my medicine?  This does not apply. This vaccine is given in a clinic, pharmacy, doctor's office, or other health care setting and will not be stored at home.  NOTE: This sheet is a summary. It may not cover all possible information. If you have questions about this medicine, talk to your doctor, pharmacist, or health care provider.  © 2018 Elsevier/[employer] (2015-09-24 10:27:27)

## 2018-11-06 DIAGNOSIS — R06.02 SHORTNESS OF BREATH: ICD-10-CM

## 2018-11-06 DIAGNOSIS — J44.1 ACUTE EXACERBATION OF CHRONIC OBSTRUCTIVE PULMONARY DISEASE (COPD) (HCC): ICD-10-CM

## 2018-11-06 DIAGNOSIS — J18.9 COMMUNITY ACQUIRED PNEUMONIA, UNSPECIFIED LATERALITY: ICD-10-CM

## 2018-11-06 RX ORDER — IPRATROPIUM BROMIDE AND ALBUTEROL SULFATE 2.5; .5 MG/3ML; MG/3ML
SOLUTION RESPIRATORY (INHALATION)
Qty: 360 ML | Refills: 0 | Status: SHIPPED | OUTPATIENT
Start: 2018-11-06 | End: 2019-03-26 | Stop reason: SDUPTHER

## 2018-11-19 ENCOUNTER — TELEPHONE (OUTPATIENT)
Dept: FAMILY MEDICINE CLINIC | Facility: CLINIC | Age: 68
End: 2018-11-19

## 2018-11-19 DIAGNOSIS — L03.116 CELLULITIS OF LEFT LOWER EXTREMITY: ICD-10-CM

## 2018-11-20 RX ORDER — CEPHALEXIN 500 MG/1
500 CAPSULE ORAL 4 TIMES DAILY
Qty: 40 CAPSULE | Refills: 0 | Status: SHIPPED | OUTPATIENT
Start: 2018-11-20 | End: 2018-12-11

## 2018-11-20 RX ORDER — CLINDAMYCIN HYDROCHLORIDE 300 MG/1
CAPSULE ORAL
Qty: 30 CAPSULE | Refills: 0 | OUTPATIENT
Start: 2018-11-20

## 2018-11-20 NOTE — TELEPHONE ENCOUNTER
Keflex 500mg 1po qid x 10 days disp #40;  If worsening, severe pain, high fevers, go ER immediately.

## 2018-12-03 ENCOUNTER — TELEPHONE (OUTPATIENT)
Dept: FAMILY MEDICINE CLINIC | Facility: CLINIC | Age: 68
End: 2018-12-03

## 2018-12-03 DIAGNOSIS — L03.90 CELLULITIS, UNSPECIFIED CELLULITIS SITE: Primary | ICD-10-CM

## 2018-12-03 RX ORDER — DOXYCYCLINE HYCLATE 100 MG/1
100 CAPSULE ORAL 2 TIMES DAILY
Qty: 14 CAPSULE | Refills: 0 | Status: SHIPPED | OUTPATIENT
Start: 2018-12-03 | End: 2018-12-10

## 2018-12-03 NOTE — TELEPHONE ENCOUNTER
Doxycycline 100mg 1 po bid x 7days and make an appt in a week to recheck.  IF red streaks, high fever, go ER

## 2018-12-11 ENCOUNTER — OFFICE VISIT (OUTPATIENT)
Dept: FAMILY MEDICINE CLINIC | Facility: CLINIC | Age: 68
End: 2018-12-11

## 2018-12-11 VITALS
HEART RATE: 84 BPM | WEIGHT: 222 LBS | OXYGEN SATURATION: 96 % | HEIGHT: 69 IN | DIASTOLIC BLOOD PRESSURE: 82 MMHG | BODY MASS INDEX: 32.88 KG/M2 | SYSTOLIC BLOOD PRESSURE: 138 MMHG | TEMPERATURE: 98 F

## 2018-12-11 DIAGNOSIS — R73.9 HYPERGLYCEMIA: ICD-10-CM

## 2018-12-11 DIAGNOSIS — G60.9 IDIOPATHIC NEUROPATHY: ICD-10-CM

## 2018-12-11 DIAGNOSIS — L03.116 CELLULITIS OF LEFT LOWER EXTREMITY: ICD-10-CM

## 2018-12-11 DIAGNOSIS — E66.09 CLASS 1 OBESITY DUE TO EXCESS CALORIES WITHOUT SERIOUS COMORBIDITY WITH BODY MASS INDEX (BMI) OF 30.0 TO 30.9 IN ADULT: Primary | ICD-10-CM

## 2018-12-11 DIAGNOSIS — I10 ESSENTIAL HYPERTENSION: ICD-10-CM

## 2018-12-11 DIAGNOSIS — R60.0 LOWER EXTREMITY EDEMA: ICD-10-CM

## 2018-12-11 PROCEDURE — 99213 OFFICE O/P EST LOW 20 MIN: CPT | Performed by: FAMILY MEDICINE

## 2018-12-11 RX ORDER — DOXYCYCLINE HYCLATE 100 MG/1
100 CAPSULE ORAL 2 TIMES DAILY
Qty: 20 CAPSULE | Refills: 0 | Status: SHIPPED | OUTPATIENT
Start: 2018-12-11 | End: 2019-10-23

## 2018-12-11 NOTE — PROGRESS NOTES
Subjective   Montana Franco is a 68 y.o. male. Presents today for   Chief Complaint   Patient presents with   • Cellulitis     left lower leg x5 mths       History of Present Illness  Patient with lower extremity edema, worse on left;  Had recurrent cellulitis, had some redness and brownish discoloration.  No calf pain, left leg worse though.  No hx of dvt/vte;  Patient not execising;  He has changed diet and started weight loss medication;  Has arterial htn, but has remained controlled despite medication.  He has lost another 15 lbs since 9/25/18, lost 25 lbs from 8/6/18.   No cp/soa; no palpitations.  patient reports some numbness of feet.    Review of Systems   Respiratory: Negative for shortness of breath.    Cardiovascular: Positive for leg swelling. Negative for chest pain.   Skin: Positive for rash.       Patient Active Problem List   Diagnosis   • Dry eyes   • Dry mouth   • Low back pain   • Pinched nerve   • Pulmonary emphysema (CMS/HCC)   • Class 1 obesity due to excess calories without serious comorbidity with body mass index (BMI) of 30.0 to 30.9 in adult   • SOB (shortness of breath)   • Leg swelling   • Hypertension       Social History     Socioeconomic History   • Marital status:      Spouse name: Not on file   • Number of children: Not on file   • Years of education: Not on file   • Highest education level: Not on file   Tobacco Use   • Smoking status: Former Smoker     Years: 54.00     Types: Cigars, Electronic Cigarette   • Smokeless tobacco: Never Used   • Tobacco comment: smokes the ecig as well   Substance and Sexual Activity   • Alcohol use: No   • Drug use: No   • Sexual activity: Defer       No Known Allergies    Current Outpatient Medications on File Prior to Visit   Medication Sig Dispense Refill   • aspirin 81 MG chewable tablet Chew 81 mg Daily.     • budesonide-formoterol (SYMBICORT) 160-4.5 MCG/ACT inhaler Inhale 2 puffs 2 (Two) Times a Day. 3 inhaler 3   • hydrochlorothiazide  "(HYDRODIURIL) 25 MG tablet TAKE 1 TABLET BY MOUTH DAILY 30 tablet 5   • ipratropium-albuterol (DUO-NEB) 0.5-2.5 mg/3 ml nebulizer USE 1 VIAL PER NEBULIZER FOUR TIMES DAILY WHILE AWAKE AND EVERY 4 HOURS AS NEEDED FOR SHORTNESS OF BREATH 360 mL 0   • losartan (COZAAR) 100 MG tablet TAKE 1 TABLET BY MOUTH DAILY 30 tablet 5   • silver sulfadiazine (SILVADENE, SSD) 1 % cream Apply  topically to the appropriate area as directed Daily. TO LEG WOUNDS 50 g 0   • [DISCONTINUED] Phentermine HCl 15 MG tablet dispersible 1 po daily 30 tablet 2   • [] doxycycline (VIBRAMYCIN) 100 MG capsule Take 1 capsule by mouth 2 (Two) Times a Day for 7 days. 14 capsule 0   • [DISCONTINUED] cephalexin (KEFLEX) 500 MG capsule Take 1 capsule by mouth 4 (Four) Times a Day. 40 capsule 0     No current facility-administered medications on file prior to visit.        Objective   Vitals:    18 1435   BP: 138/82   BP Location: Left arm   Patient Position: Sitting   Cuff Size: Adult   Pulse: 84   Temp: 98 °F (36.7 °C)   TempSrc: Oral   SpO2: 96%   Weight: 101 kg (222 lb)   Height: 175.3 cm (69\")       Physical Exam   Constitutional: He appears well-developed and well-nourished.   Neck: Neck supple.   Musculoskeletal: He exhibits edema (no calf pain;  left > right lower extremity edema;  redness on left, no warmth or tenderness.).   Neurological: He is alert.   Skin: Skin is warm and dry.   Psychiatric: He has a normal mood and affect. His behavior is normal.   Nursing note and vitals reviewed.      Assessment/Plan   Montana was seen today for cellulitis.    Diagnoses and all orders for this visit:    Class 1 obesity due to excess calories without serious comorbidity with body mass index (BMI) of 30.0 to 30.9 in adult  -     Phentermine HCl 15 MG tablet dispersible; 1 po daily    Cellulitis of left lower extremity    Lower extremity edema  -     Duplex Venous Lower Extremity - Left CAR; Future    Essential hypertension    -hypertension - " controlled, continue medications  -continue wokr on weigh tloss;  Ok continue phentermine, but watch bp  -d/w venous stasis, low Na diet;  Elevate legs;  Start exercise.         -Follow up: 3 months and prn

## 2019-03-26 DIAGNOSIS — R06.02 SHORTNESS OF BREATH: ICD-10-CM

## 2019-03-26 DIAGNOSIS — J44.1 ACUTE EXACERBATION OF CHRONIC OBSTRUCTIVE PULMONARY DISEASE (COPD) (HCC): ICD-10-CM

## 2019-03-26 DIAGNOSIS — J18.9 COMMUNITY ACQUIRED PNEUMONIA, UNSPECIFIED LATERALITY: ICD-10-CM

## 2019-03-26 RX ORDER — IPRATROPIUM BROMIDE AND ALBUTEROL SULFATE 2.5; .5 MG/3ML; MG/3ML
SOLUTION RESPIRATORY (INHALATION)
Qty: 360 ML | Refills: 0 | Status: SHIPPED | OUTPATIENT
Start: 2019-03-26 | End: 2019-06-03 | Stop reason: SDUPTHER

## 2019-04-25 DIAGNOSIS — I10 ESSENTIAL HYPERTENSION: ICD-10-CM

## 2019-04-25 RX ORDER — HYDROCHLOROTHIAZIDE 25 MG/1
25 TABLET ORAL DAILY
Qty: 30 TABLET | Refills: 0 | Status: SHIPPED | OUTPATIENT
Start: 2019-04-25 | End: 2019-05-28 | Stop reason: SDUPTHER

## 2019-05-23 DIAGNOSIS — I16.0 HYPERTENSIVE URGENCY: ICD-10-CM

## 2019-05-23 RX ORDER — LOSARTAN POTASSIUM 100 MG/1
100 TABLET ORAL DAILY
Qty: 30 TABLET | Refills: 1 | Status: SHIPPED | OUTPATIENT
Start: 2019-05-23 | End: 2019-08-05 | Stop reason: SDUPTHER

## 2019-05-28 DIAGNOSIS — E66.09 CLASS 1 OBESITY DUE TO EXCESS CALORIES WITHOUT SERIOUS COMORBIDITY WITH BODY MASS INDEX (BMI) OF 30.0 TO 30.9 IN ADULT: ICD-10-CM

## 2019-05-28 DIAGNOSIS — I10 ESSENTIAL HYPERTENSION: ICD-10-CM

## 2019-05-28 RX ORDER — PHENTERMINE HYDROCHLORIDE 15 MG/1
CAPSULE ORAL
Qty: 30 CAPSULE | Refills: 0 | OUTPATIENT
Start: 2019-05-28

## 2019-05-28 RX ORDER — HYDROCHLOROTHIAZIDE 25 MG/1
25 TABLET ORAL DAILY
Qty: 30 TABLET | Refills: 0 | Status: SHIPPED | OUTPATIENT
Start: 2019-05-28 | End: 2019-07-09 | Stop reason: SDUPTHER

## 2019-06-03 DIAGNOSIS — R06.02 SHORTNESS OF BREATH: ICD-10-CM

## 2019-06-03 DIAGNOSIS — J18.9 COMMUNITY ACQUIRED PNEUMONIA, UNSPECIFIED LATERALITY: ICD-10-CM

## 2019-06-03 DIAGNOSIS — J44.1 ACUTE EXACERBATION OF CHRONIC OBSTRUCTIVE PULMONARY DISEASE (COPD) (HCC): ICD-10-CM

## 2019-06-03 RX ORDER — IPRATROPIUM BROMIDE AND ALBUTEROL SULFATE 2.5; .5 MG/3ML; MG/3ML
SOLUTION RESPIRATORY (INHALATION)
Qty: 360 ML | Refills: 0 | Status: SHIPPED | OUTPATIENT
Start: 2019-06-03 | End: 2019-10-23 | Stop reason: SDUPTHER

## 2019-07-09 DIAGNOSIS — I10 ESSENTIAL HYPERTENSION: ICD-10-CM

## 2019-07-09 RX ORDER — HYDROCHLOROTHIAZIDE 25 MG/1
25 TABLET ORAL DAILY
Qty: 30 TABLET | Refills: 0 | Status: SHIPPED | OUTPATIENT
Start: 2019-07-09 | End: 2019-08-12 | Stop reason: SDUPTHER

## 2019-08-05 DIAGNOSIS — I16.0 HYPERTENSIVE URGENCY: ICD-10-CM

## 2019-08-05 RX ORDER — LOSARTAN POTASSIUM 100 MG/1
100 TABLET ORAL DAILY
Qty: 30 TABLET | Refills: 0 | Status: SHIPPED | OUTPATIENT
Start: 2019-08-05 | End: 2019-09-06 | Stop reason: SDUPTHER

## 2019-08-12 DIAGNOSIS — E66.09 CLASS 1 OBESITY DUE TO EXCESS CALORIES WITHOUT SERIOUS COMORBIDITY WITH BODY MASS INDEX (BMI) OF 30.0 TO 30.9 IN ADULT: ICD-10-CM

## 2019-08-12 DIAGNOSIS — I10 ESSENTIAL HYPERTENSION: ICD-10-CM

## 2019-08-12 RX ORDER — PHENTERMINE HYDROCHLORIDE 15 MG/1
CAPSULE ORAL
Qty: 30 CAPSULE | Refills: 0 | OUTPATIENT
Start: 2019-08-12

## 2019-08-12 RX ORDER — HYDROCHLOROTHIAZIDE 25 MG/1
TABLET ORAL
Qty: 30 TABLET | Refills: 0 | Status: SHIPPED | OUTPATIENT
Start: 2019-08-12 | End: 2019-09-14 | Stop reason: SDUPTHER

## 2019-08-16 DIAGNOSIS — E66.09 CLASS 1 OBESITY DUE TO EXCESS CALORIES WITHOUT SERIOUS COMORBIDITY WITH BODY MASS INDEX (BMI) OF 30.0 TO 30.9 IN ADULT: ICD-10-CM

## 2019-09-06 DIAGNOSIS — I16.0 HYPERTENSIVE URGENCY: ICD-10-CM

## 2019-09-06 RX ORDER — LOSARTAN POTASSIUM 100 MG/1
100 TABLET ORAL DAILY
Qty: 30 TABLET | Refills: 0 | Status: SHIPPED | OUTPATIENT
Start: 2019-09-06 | End: 2019-10-23 | Stop reason: SDUPTHER

## 2019-09-14 DIAGNOSIS — I10 ESSENTIAL HYPERTENSION: ICD-10-CM

## 2019-09-16 RX ORDER — HYDROCHLOROTHIAZIDE 25 MG/1
25 TABLET ORAL DAILY
Qty: 30 TABLET | Refills: 0 | Status: SHIPPED | OUTPATIENT
Start: 2019-09-16 | End: 2019-10-23 | Stop reason: SDUPTHER

## 2019-10-10 DIAGNOSIS — I16.0 HYPERTENSIVE URGENCY: ICD-10-CM

## 2019-10-10 DIAGNOSIS — I10 ESSENTIAL HYPERTENSION: ICD-10-CM

## 2019-10-10 RX ORDER — LOSARTAN POTASSIUM 100 MG/1
TABLET ORAL
Qty: 30 TABLET | Refills: 0 | OUTPATIENT
Start: 2019-10-10

## 2019-10-10 RX ORDER — HYDROCHLOROTHIAZIDE 25 MG/1
TABLET ORAL
Qty: 30 TABLET | Refills: 0 | OUTPATIENT
Start: 2019-10-10

## 2019-10-23 ENCOUNTER — OFFICE VISIT (OUTPATIENT)
Dept: FAMILY MEDICINE CLINIC | Facility: CLINIC | Age: 69
End: 2019-10-23

## 2019-10-23 VITALS
TEMPERATURE: 98.1 F | BODY MASS INDEX: 27.85 KG/M2 | WEIGHT: 188 LBS | HEIGHT: 69 IN | HEART RATE: 86 BPM | OXYGEN SATURATION: 98 % | SYSTOLIC BLOOD PRESSURE: 158 MMHG | DIASTOLIC BLOOD PRESSURE: 80 MMHG

## 2019-10-23 DIAGNOSIS — J44.1 ACUTE EXACERBATION OF CHRONIC OBSTRUCTIVE PULMONARY DISEASE (COPD) (HCC): ICD-10-CM

## 2019-10-23 DIAGNOSIS — J44.1 CHRONIC OBSTRUCTIVE PULMONARY DISEASE WITH ACUTE EXACERBATION (HCC): ICD-10-CM

## 2019-10-23 DIAGNOSIS — I11.0 HYPERTENSIVE HEART DISEASE WITH HEART FAILURE (HCC): ICD-10-CM

## 2019-10-23 DIAGNOSIS — I10 ESSENTIAL HYPERTENSION: ICD-10-CM

## 2019-10-23 DIAGNOSIS — E66.09 CLASS 1 OBESITY DUE TO EXCESS CALORIES WITHOUT SERIOUS COMORBIDITY WITH BODY MASS INDEX (BMI) OF 30.0 TO 30.9 IN ADULT: ICD-10-CM

## 2019-10-23 DIAGNOSIS — J18.9 COMMUNITY ACQUIRED PNEUMONIA, UNSPECIFIED LATERALITY: ICD-10-CM

## 2019-10-23 DIAGNOSIS — Z00.00 ENCOUNTER FOR MEDICARE ANNUAL WELLNESS EXAM: Primary | ICD-10-CM

## 2019-10-23 DIAGNOSIS — R06.02 SHORTNESS OF BREATH: ICD-10-CM

## 2019-10-23 PROCEDURE — G0439 PPPS, SUBSEQ VISIT: HCPCS | Performed by: NURSE PRACTITIONER

## 2019-10-23 PROCEDURE — 99406 BEHAV CHNG SMOKING 3-10 MIN: CPT | Performed by: NURSE PRACTITIONER

## 2019-10-23 PROCEDURE — 99214 OFFICE O/P EST MOD 30 MIN: CPT | Performed by: NURSE PRACTITIONER

## 2019-10-23 RX ORDER — BUDESONIDE AND FORMOTEROL FUMARATE DIHYDRATE 160; 4.5 UG/1; UG/1
2 AEROSOL RESPIRATORY (INHALATION)
Qty: 3 INHALER | Refills: 3 | Status: SHIPPED | OUTPATIENT
Start: 2019-10-23 | End: 2020-11-04

## 2019-10-23 RX ORDER — BUDESONIDE AND FORMOTEROL FUMARATE DIHYDRATE 160; 4.5 UG/1; UG/1
2 AEROSOL RESPIRATORY (INHALATION)
Qty: 3 INHALER | Refills: 3 | Status: SHIPPED | OUTPATIENT
Start: 2019-10-23 | End: 2019-10-23 | Stop reason: SDUPTHER

## 2019-10-23 RX ORDER — IPRATROPIUM BROMIDE AND ALBUTEROL SULFATE 2.5; .5 MG/3ML; MG/3ML
1.5 SOLUTION RESPIRATORY (INHALATION)
Qty: 360 ML | Refills: 0 | Status: SHIPPED | OUTPATIENT
Start: 2019-10-23 | End: 2020-02-25

## 2019-10-23 RX ORDER — HYDROCHLOROTHIAZIDE 25 MG/1
25 TABLET ORAL DAILY
Qty: 30 TABLET | Refills: 0 | Status: SHIPPED | OUTPATIENT
Start: 2019-10-23 | End: 2019-11-19 | Stop reason: SDUPTHER

## 2019-10-23 RX ORDER — LOSARTAN POTASSIUM 100 MG/1
100 TABLET ORAL DAILY
Qty: 30 TABLET | Refills: 0 | Status: SHIPPED | OUTPATIENT
Start: 2019-10-23 | End: 2019-11-19 | Stop reason: SDUPTHER

## 2019-10-23 NOTE — PATIENT INSTRUCTIONS
Varenicline oral tablets  What is this medicine?  VARENICLINE (vane EN i kleen) is used to help people quit smoking. It is used with a patient support program recommended by your physician.  This medicine may be used for other purposes; ask your health care provider or pharmacist if you have questions.  COMMON BRAND NAME(S): Sary  What should I tell my health care provider before I take this medicine?  They need to know if you have any of these conditions:  -heart disease  -if you often drink alcohol  -kidney disease  -mental illness  -on hemodialysis  -seizures  -history of stroke  -suicidal thoughts, plans, or attempt; a previous suicide attempt by you or a family member  -an unusual or allergic reaction to varenicline, other medicines, foods, dyes, or preservatives  -pregnant or trying to get pregnant  -breast-feeding  How should I use this medicine?  Take this medicine by mouth after eating. Take with a full glass of water. Follow the directions on the prescription label. Take your doses at regular intervals. Do not take your medicine more often than directed.  There are 3 ways you can use this medicine to help you quit smoking; talk to your health care professional to decide which plan is right for you:  1) you can choose a quit date and start this medicine 1 week before the quit date, or,  2) you can start taking this medicine before you choose a quit date, and then pick a quit date between day 8 and 35 days of treatment, or,  3) if you are not sure that you are able or willing to quit smoking right away, start taking this medicine and slowly decrease the amount you smoke as directed by your health care professional with the goal of being cigarette-free by week 12 of treatment.  Stick to your plan; ask about support groups or other ways to help you remain cigarette-free. If you are motivated to quit smoking and did not succeed during a previous attempt with this medicine for reasons other than side effects,  or if you returned to smoking after this treatment, speak with your health care professional about whether another course of this medicine may be right for you.  A special MedGuide will be given to you by the pharmacist with each prescription and refill. Be sure to read this information carefully each time.  Talk to your pediatrician regarding the use of this medicine in children. This medicine is not approved for use in children.  Overdosage: If you think you have taken too much of this medicine contact a poison control center or emergency room at once.  NOTE: This medicine is only for you. Do not share this medicine with others.  What if I miss a dose?  If you miss a dose, take it as soon as you can. If it is almost time for your next dose, take only that dose. Do not take double or extra doses.  What may interact with this medicine?  -alcohol  -insulin  -other medicines used to help people quit smoking  -theophylline  -warfarin  This list may not describe all possible interactions. Give your health care provider a list of all the medicines, herbs, non-prescription drugs, or dietary supplements you use. Also tell them if you smoke, drink alcohol, or use illegal drugs. Some items may interact with your medicine.  What should I watch for while using this medicine?  It is okay if you do not succeed at your attempt to quit and have a cigarette. You can still continue your quit attempt and keep using this medicine as directed. Just throw away your cigarettes and get back to your quit plan.  Talk to your health care provider before using other treatments to quit smoking. Using this medicine with other treatments to quit smoking may increase the risk for side effects compared to using a treatment alone.  You may get drowsy or dizzy. Do not drive, use machinery, or do anything that needs mental alertness until you know how this medicine affects you. Do not stand or sit up quickly, especially if you are an older patient.  This reduces the risk of dizzy or fainting spells.  Decrease the amount of alcoholic beverages that you drink during treatment with this medicine until you know if this medicine affects your ability to tolerate alcohol. Some people have experienced increased drunkenness (intoxication), unusual or sometimes aggressive behavior, or no memory of things that have happened (amnesia) during treatment with this medicine.  Sleepwalking can happen during treatment with this medicine, and can sometimes lead to behavior that is harmful to you, other people, or property. Stop taking this medicine and tell your doctor if you start sleepwalking or have other unusual sleep-related activity.  Patients and their families should watch out for new or worsening depression or thoughts of suicide. Also watch out for sudden changes in feelings such as feeling anxious, agitated, panicky, irritable, hostile, aggressive, impulsive, severely restless, overly excited and hyperactive, or not being able to sleep. If this happens, call your health care professional.  If you have diabetes and you quit smoking, the effects of insulin may be increased and you may need to reduce your insulin dose. Check with your doctor or health care professional about how you should adjust your insulin dose.  What side effects may I notice from receiving this medicine?  Side effects that you should report to your doctor or health care professional as soon as possible:  -allergic reactions like skin rash, itching or hives, swelling of the face, lips, tongue, or throat  -acting aggressive, being angry or violent, or acting on dangerous impulses  -breathing problems  -changes in emotions or moods  -chest pain or chest tightness  -feeling faint or lightheaded, falls  -hallucination, loss of contact with reality  -mouth sores  -redness, blistering, peeling or loosening of the skin, including inside the mouth  -signs and symptoms of a stroke like changes in vision;  "confusion; trouble speaking or understanding; severe headaches; sudden numbness or weakness of the face, arm or leg; trouble walking; dizziness; loss of balance or coordination  -seizures  -sleepwalking  -suicidal thoughts or other mood changes  Side effects that usually do not require medical attention (report to your doctor or health care professional if they continue or are bothersome):  -constipation  -gas  -headache  -nausea, vomiting  -strange dreams  -trouble sleeping  This list may not describe all possible side effects. Call your doctor for medical advice about side effects. You may report side effects to FDA at 3-589-FDA-7062.  Where should I keep my medicine?  Keep out of the reach of children.  Store at room temperature between 15 and 30 degrees C (59 and 86 degrees F). Throw away any unused medicine after the expiration date.  NOTE: This sheet is a summary. It may not cover all possible information. If you have questions about this medicine, talk to your doctor, pharmacist, or health care provider.  © 2019 [employer] (2018-06-15 14:42:00)    Osteoarthritis    Osteoarthritis is a type of arthritis that affects tissue that covers the ends of bones in joints (cartilage). Cartilage acts as a cushion between the bones and helps them move smoothly. Osteoarthritis results when cartilage in the joints gets worn down. Osteoarthritis is sometimes called \"wear and tear\" arthritis.  Osteoarthritis is the most common form of arthritis. It often occurs in older people. It is a condition that gets worse over time (a progressive condition). Joints that are most often affected by this condition are in:  · Fingers.  · Toes.  · Hips.  · Knees.  · Spine, including neck and lower back.  What are the causes?  This condition is caused by age-related wearing down of cartilage that covers the ends of bones.  What increases the risk?  The following factors may make you more likely to develop this condition:  · Older " age.  · Being overweight or obese.  · Overuse of joints, such as in athletes.  · Past injury of a joint.  · Past surgery on a joint.  · Family history of osteoarthritis.  What are the signs or symptoms?  The main symptoms of this condition are pain, swelling, and stiffness in the joint. The joint may lose its shape over time. Small pieces of bone or cartilage may break off and float inside of the joint, which may cause more pain and damage to the joint. Small deposits of bone (osteophytes) may grow on the edges of the joint. Other symptoms may include:  · A grating or scraping feeling inside the joint when you move it.  · Popping or creaking sounds when you move.  Symptoms may affect one or more joints. Osteoarthritis in a major joint, such as your knee or hip, can make it painful to walk or exercise. If you have osteoarthritis in your hands, you might not be able to  items, twist your hand, or control small movements of your hands and fingers (fine motor skills).  How is this diagnosed?  This condition may be diagnosed based on:  · Your medical history.  · A physical exam.  · Your symptoms.  · X-rays of the affected joint(s).  · Blood tests to rule out other types of arthritis.  How is this treated?  There is no cure for this condition, but treatment can help to control pain and improve joint function. Treatment plans may include:  · A prescribed exercise program that allows for rest and joint relief. You may work with a physical therapist.  · A weight control plan.  · Pain relief techniques, such as:  ? Applying heat and cold to the joint.  ? Electric pulses delivered to nerve endings under the skin (transcutaneous electrical nerve stimulation, or TENS).  ? Massage.  ? Certain nutritional supplements.  · NSAIDs or prescription medicines to help relieve pain.  · Medicine to help relieve pain and inflammation (corticosteroids). This can be given by mouth (orally) or as an injection.  · Assistive devices, such as  a brace, wrap, splint, specialized glove, or cane.  · Surgery, such as:  ? An osteotomy. This is done to reposition the bones and relieve pain or to remove loose pieces of bone and cartilage.  ? Joint replacement surgery. You may need this surgery if you have very bad (advanced) osteoarthritis.  Follow these instructions at home:  Activity  · Rest your affected joints as directed by your health care provider.  · Do not drive or use heavy machinery while taking prescription pain medicine.  · Exercise as directed. Your health care provider or physical therapist may recommend specific types of exercise, such as:  ? Strengthening exercises. These are done to strengthen the muscles that support joints that are affected by arthritis. They can be performed with weights or with exercise bands to add resistance.  ? Aerobic activities. These are exercises, such as brisk walking or water aerobics, that get your heart pumping.  ? Range-of-motion activities. These keep your joints easy to move.  ? Balance and agility exercises.  Managing pain, stiffness, and swelling    · If directed, apply heat to the affected area as often as told by your health care provider. Use the heat source that your health care provider recommends, such as a moist heat pack or a heating pad.  ? If you have a removable assistive device, remove it as told by your health care provider.  ? Place a towel between your skin and the heat source. If your health care provider tells you to keep the assistive device on while you apply heat, place a towel between the assistive device and the heat source.  ? Leave the heat on for 20-30 minutes.  ? Remove the heat if your skin turns bright red. This is especially important if you are unable to feel pain, heat, or cold. You may have a greater risk of getting burned.  · If directed, put ice on the affected joint:  ? If you have a removable assistive device, remove it as told by your health care provider.  ? Put ice in a  plastic bag.  ? Place a towel between your skin and the bag. If your health care provider tells you to keep the assistive device on during icing, place a towel between the assistive device and the bag.  ? Leave the ice on for 20 minutes, 2-3 times a day.  General instructions  · Take over-the-counter and prescription medicines only as told by your health care provider.  · Maintain a healthy weight. Follow instructions from your health care provider for weight control. These may include dietary restrictions.  · Do not use any products that contain nicotine or tobacco, such as cigarettes and e-cigarettes. These can delay bone healing. If you need help quitting, ask your health care provider.  · Use assistive devices as directed by your health care provider.  · Keep all follow-up visits as told by your health care provider. This is important.  Where to find more information  · National [place] of Arthritis and Musculoskeletal and Skin Diseases: www.niams.nih.gov  · [employer] [place] on Aging: www.norm.nih.gov  · American College of Rheumatology: www.rheumatology.org  Contact a health care provider if:  · Your skin turns red.  · You develop a rash.  · You have pain that gets worse.  · You have a fever along with joint or muscle aches.  Get help right away if:  · You lose a lot of weight.  · You suddenly lose your appetite.  · You have night sweats.  Summary  · Osteoarthritis is a type of arthritis that affects tissue covering the ends of bones in joints (cartilage).  · This condition is caused by age-related wearing down of cartilage that covers the ends of bones.  · The main symptom of this condition is pain, swelling, and stiffness in the joint.  · There is no cure for this condition, but treatment can help to control pain and improve joint function.  This information is not intended to replace advice given to you by your health care provider. Make sure you discuss any questions you have with your health care  provider.  Document Released: 12/18/2006 Document Revised: 09/24/2018 Document Reviewed: 08/21/2017  [employer] Interactive Patient Education © 2019 [employer]    Arthritis  Arthritis is a term that is commonly used to refer to joint pain or joint disease. There are more than 100 types of arthritis.  What are the causes?  The most common cause of this condition is wear and tear of a joint. Other causes include:  · Gout.  · Inflammation of a joint.  · An infection of a joint.  · Sprains and other injuries near the joint.  · A drug reaction or allergic reaction.  In some cases, the cause may not be known.  What are the signs or symptoms?  The main symptom of this condition is pain in the joint with movement. Other symptoms include:  · Redness, swelling, or stiffness at a joint.  · Warmth coming from the joint.  · Fever.  · Overall feeling of illness.  How is this diagnosed?  This condition may be diagnosed with a physical exam and tests, including:  · Blood tests.  · Urine tests.  · Imaging tests, such as MRI, X-rays, or a CT scan.  Sometimes, fluid is removed from a joint for testing.  How is this treated?  Treatment for this condition may involve:  · Treatment of the cause, if it is known.  · Rest.  · Raising (elevating) the joint.  · Applying cold or hot packs to the joint.  · Medicines to improve symptoms and reduce inflammation.  · Injections of a steroid such as cortisone into the joint to help reduce pain and inflammation.  Depending on the cause of your arthritis, you may need to make lifestyle changes to reduce stress on your joint. These changes may include exercising more and losing weight.  Follow these instructions at home:  Medicines  · Take over-the-counter and prescription medicines only as told by your health care provider.  · Do not take aspirin to relieve pain if gout is suspected.  Activity  · Rest your joint if told by your health care provider. Rest is important when your disease is active and  your joint feels painful, swollen, or stiff.  · Avoid activities that make the pain worse. It is important to balance activity with rest.  · Exercise your joint regularly with range-of-motion exercises as told by your health care provider. Try doing low-impact exercise, such as:  ? Swimming.  ? Water aerobics.  ? Biking.  ? Walking.  Joint Care    · If your joint is swollen, keep it elevated if told by your health care provider.  · If your joint feels stiff in the morning, try taking a warm shower.  · If directed, apply heat to the joint. If you have diabetes, do not apply heat without permission from your health care provider.  ? Put a towel between the joint and the hot pack or heating pad.  ? Leave the heat on the area for 20-30 minutes.  · If directed, apply ice to the joint:  ? Put ice in a plastic bag.  ? Place a towel between your skin and the bag.  ? Leave the ice on for 20 minutes, 2-3 times per day.  · Keep all follow-up visits as told by your health care provider. This is important.  Contact a health care provider if:  · The pain gets worse.  · You have a fever.  Get help right away if:  · You develop severe joint pain, swelling, or redness.  · Many joints become painful and swollen.  · You develop severe back pain.  · You develop severe weakness in your leg.  · You cannot control your bladder or bowels.  This information is not intended to replace advice given to you by your health care provider. Make sure you discuss any questions you have with your health care provider.  Document Released: 01/25/2006 Document Revised: 05/25/2017 Document Reviewed: 03/14/2016  [employer] Interactive Patient Education © 2019 [employer]

## 2019-10-23 NOTE — PROGRESS NOTES
QUICK REFERENCE INFORMATION:  The ABCs of the Annual Wellness Visit    Subsequent Medicare Wellness Visit    HEALTH RISK ASSESSMENT    1950    Recent Hospitalizations:  No hospitalization(s) within the last year..        Current Medical Providers:  Patient Care Team:  Kike Frost DO as PCP - General (Family Medicine)  Kike Frost DO as PCP - Claims Attributed        Smoking Status:  Social History     Tobacco Use   Smoking Status Former Smoker   • Years: 54.00   • Types: Cigars, Electronic Cigarette   Smokeless Tobacco Never Used   Tobacco Comment    smokes the ecig as well       Alcohol Consumption:  Social History     Substance and Sexual Activity   Alcohol Use No       Depression Screen:   PHQ-2/PHQ-9 Depression Screening 10/23/2019   Little interest or pleasure in doing things 0   Feeling down, depressed, or hopeless 0   Trouble falling or staying asleep, or sleeping too much 0   Feeling tired or having little energy 0   Poor appetite or overeating 0   Feeling bad about yourself - or that you are a failure or have let yourself or your family down 0   Trouble concentrating on things, such as reading the newspaper or watching television 0   Moving or speaking so slowly that other people could have noticed. Or the opposite - being so fidgety or restless that you have been moving around a lot more than usual 0   Thoughts that you would be better off dead, or of hurting yourself in some way 0   Total Score 0   If you checked off any problems, how difficult have these problems made it for you to do your work, take care of things at home, or get along with other people? Not difficult at all       Health Habits and Functional and Cognitive Screening:  Functional & Cognitive Status 10/23/2019   Do you have difficulty preparing food and eating? No   Do you have difficulty bathing yourself, getting dressed or grooming yourself? No   Do you have difficulty using the toilet? No   Do you have difficulty  moving around from place to place? No   Do you have trouble with steps or getting out of a bed or a chair? No   Current Diet Well Balanced Diet   Dental Exam Not up to date   Eye Exam Not up to date   Exercise (times per week) 0 times per week   Current Exercise Activities Include None   Do you need help using the phone?  No   Are you deaf or do you have serious difficulty hearing?  No   Do you need help with transportation? No   Do you need help shopping? No   Do you need help preparing meals?  No   Do you need help with housework?  No   Do you need help with laundry? No   Do you need help taking your medications? No   Do you need help managing money? No   Do you ever drive or ride in a car without wearing a seat belt? No   Have you felt unusual stress, anger or loneliness in the last month? No   Who do you live with? Alone   If you need help, do you have trouble finding someone available to you? No   Have you been bothered in the last four weeks by sexual problems? No   Do you have difficulty concentrating, remembering or making decisions? No           Does the patient have evidence of cognitive impairment? No    Aspirin use counseling: Start ASA 81 mg daily       Recent Lab Results:  CMP:  Lab Results   Component Value Date     (H) 01/09/2018    BUN 23 01/09/2018    CREATININE 1.02 01/09/2018    EGFRIFNONA 76 01/09/2018    EGFRIFAFRI 88 01/09/2018    BCR 23 01/09/2018     01/09/2018    K 4.7 01/09/2018    CO2 29 01/09/2018    CALCIUM 8.9 01/09/2018    PROTENTOTREF 6.8 01/09/2018    ALBUMIN 4.4 01/09/2018    LABGLOBREF 2.4 01/09/2018    LABIL2 1.8 01/09/2018    BILITOT 0.5 01/09/2018    ALKPHOS 57 01/09/2018    AST 22 01/09/2018    ALT 44 01/09/2018     Lipid Panel:  Lab Results   Component Value Date    CHOL 185 04/30/2018    TRIG 135 04/30/2018    HDL 41 04/30/2018    VLDL 27 04/30/2018    LDLHDL 2.85 04/30/2018     HbA1c:       Visual Acuity:  No exam data present    Age-appropriate Screening  Schedule:  Refer to the list below for future screening recommendations based on patient's age, sex and/or medical conditions. Orders for these recommended tests are listed in the plan section. The patient has been provided with a written plan.    Health Maintenance   Topic Date Due   • TDAP/TD VACCINES (1 - Tdap) 10/11/1970   • PNEUMOCOCCAL VACCINES (65+ LOW/MEDIUM RISK) (1 of 2 - PCV13) 10/11/2016   • ZOSTER VACCINE (2 of 2) 03/06/2018   • INFLUENZA VACCINE  08/01/2019   • COLONOSCOPY  01/09/2028        Subjective   History of Present Illness    Montana Franco is a 69 y.o. male who presents for an Subsequent Wellness Visit.    The following portions of the patient's history were reviewed and updated as appropriate: allergies, current medications, past family history, past medical history, past social history, past surgical history and problem list.    Outpatient Medications Prior to Visit   Medication Sig Dispense Refill   • aspirin 81 MG chewable tablet Chew 81 mg Daily.     • silver sulfadiazine (SILVADENE, SSD) 1 % cream Apply  topically to the appropriate area as directed Daily. TO LEG WOUNDS 50 g 0   • budesonide-formoterol (SYMBICORT) 160-4.5 MCG/ACT inhaler Inhale 2 puffs 2 (Two) Times a Day. 3 inhaler 3   • doxycycline (VIBRAMYCIN) 100 MG capsule Take 1 capsule by mouth 2 (Two) Times a Day. 20 capsule 0   • hydrochlorothiazide (HYDRODIURIL) 25 MG tablet Take 1 tablet by mouth Daily. PLEASE CALL THE OFFICE FOR AN APPT 30 tablet 0   • ipratropium-albuterol (DUO-NEB) 0.5-2.5 mg/3 ml nebulizer USE 1 VIAL PER NEBULIZER FOUR TIMES DAILY WHILE AWAKE AND EVERY 4 HOURS AS NEEDED FOR SHORTNESS OF BREATH 360 mL 0   • losartan (COZAAR) 100 MG tablet Take 1 tablet by mouth Daily. PLEASE CALL THE OFFICE FOR AN APPT 30 tablet 0   • Phentermine HCl 15 MG tablet dispersible 1 po daily 30 tablet 1     No facility-administered medications prior to visit.        Patient Active Problem List   Diagnosis   • Dry eyes   • Dry mouth  "  • Low back pain   • Pinched nerve   • Pulmonary emphysema (CMS/HCC)   • Class 1 obesity due to excess calories without serious comorbidity with body mass index (BMI) of 30.0 to 30.9 in adult   • SOB (shortness of breath)   • Leg swelling   • Hypertension       Advance Care Planning:  Patient does not have an advance directive - not interested in additional information    Identification of Risk Factors:  Risk factors include: Advance Directive Discussion  Chronic Pain   Immunizations Discussed/Encouraged (specific immunizations; Influenza ).    Review of Systems   Respiratory: Positive for shortness of breath.    Cardiovascular: Negative for chest pain and palpitations.   Musculoskeletal: Positive for neck pain.   Neurological: Negative for headaches.       Compared to one year ago, the patient feels his physical health is better.  Compared to one year ago, the patient feels his mental health is better.    Objective     Physical Exam   Constitutional: He appears well-developed and well-nourished.   Eyes: Conjunctivae are normal. Pupils are equal, round, and reactive to light.   Neck: Normal range of motion.   Cardiovascular: Normal rate, regular rhythm and normal heart sounds.   Pulmonary/Chest: Effort normal. No respiratory distress. He has wheezes.   Abdominal: Bowel sounds are normal. He exhibits no distension and no mass. There is no tenderness. There is no rebound and no guarding.   Musculoskeletal:        Cervical back: He exhibits tenderness and pain.   Tendons in neck tense and muscle tense    Skin: Skin is warm and dry.   Psychiatric: He has a normal mood and affect.       Vitals:    10/23/19 1128   BP: 158/80   BP Location: Right arm   Patient Position: Sitting   Cuff Size: Adult   Pulse: 86   Temp: 98.1 °F (36.7 °C)   TempSrc: Oral   SpO2: 98%   Weight: 85.3 kg (188 lb)   Height: 175.3 cm (69\")   PainSc:   4       Patient's Body mass index is 27.76 kg/m². BMI is above normal parameters. Recommendations " include: educational material, exercise counseling, nutrition counseling and pharmacological intervention.      Assessment/Plan   Patient Self-Management and Personalized Health Advice  The patient has been provided with information about: diet, exercise, weight management, tobacco cessation and health prevention. and preventive services including:   · Annual Wellness Visit (AWV)  · Counseling to Prevent Tobacco Use (use of smartset and @cessation@ [employer] for documentation)  · Influenza Vaccine and Administration.    Visit Diagnoses:    ICD-10-CM ICD-9-CM   1. Encounter for Medicare annual wellness exam Z00.00 V70.0   2. Shortness of breath R06.02 786.05   3. Chronic obstructive pulmonary disease with acute exacerbation (CMS/[facility]) J44.1 491.21   4. Essential hypertension I10 401.9   5. Community acquired pneumonia, unspecified laterality J18.9 486   6. Acute exacerbation of chronic obstructive pulmonary disease (COPD) (CMS/[facility]) J44.1 491.21   7. Hypertensive heart disease with heart failure (CMS/[facility])  I11.0 402.91     428.9   8. Class 1 obesity due to excess calories without serious comorbidity with body mass index (BMI) of 30.0 to 30.9 in adult E66.09 278.00    Z68.30 V85.30       Orders Placed This Encounter   Procedures   • Lipid Panel   • BNP   • Sedimentation Rate   • Comprehensive metabolic panel     Standing Status:   Future     Standing Expiration Date:   10/23/2020   • CBC & Differential       Outpatient Encounter Medications as of 10/23/2019   Medication Sig Dispense Refill   • aspirin 81 MG chewable tablet Chew 81 mg Daily.     • budesonide-formoterol (SYMBICORT) 160-4.5 MCG/ACT inhaler Inhale 2 puffs 2 (Two) Times a Day. 3 inhaler 3   • hydroCHLOROthiazide (HYDRODIURIL) 25 MG tablet Take 1 tablet by mouth Daily. PLEASE CALL THE OFFICE FOR AN APPT 30 tablet 0   • ipratropium-albuterol (DUO-NEB) 0.5-2.5 mg/3 ml nebulizer Take 1.5 mL by nebulization 4 (Four) Times a Day. 360 mL 0   • losartan (COZAAR)  100 MG tablet Take 1 tablet by mouth Daily. PLEASE CALL THE OFFICE FOR AN APPT 30 tablet 0   • Phentermine HCl 15 MG tablet dispersible 1 po daily 30 tablet 0   • silver sulfadiazine (SILVADENE, SSD) 1 % cream Apply  topically to the appropriate area as directed Daily. TO LEG WOUNDS 50 g 0   • [DISCONTINUED] budesonide-formoterol (SYMBICORT) 160-4.5 MCG/ACT inhaler Inhale 2 puffs 2 (Two) Times a Day. 3 inhaler 3   • [DISCONTINUED] budesonide-formoterol (SYMBICORT) 160-4.5 MCG/ACT inhaler Inhale 2 puffs 2 (Two) Times a Day. 3 inhaler 3   • [DISCONTINUED] doxycycline (VIBRAMYCIN) 100 MG capsule Take 1 capsule by mouth 2 (Two) Times a Day. 20 capsule 0   • [DISCONTINUED] hydrochlorothiazide (HYDRODIURIL) 25 MG tablet Take 1 tablet by mouth Daily. PLEASE CALL THE OFFICE FOR AN APPT 30 tablet 0   • [DISCONTINUED] ipratropium-albuterol (DUO-NEB) 0.5-2.5 mg/3 ml nebulizer USE 1 VIAL PER NEBULIZER FOUR TIMES DAILY WHILE AWAKE AND EVERY 4 HOURS AS NEEDED FOR SHORTNESS OF BREATH 360 mL 0   • [DISCONTINUED] losartan (COZAAR) 100 MG tablet Take 1 tablet by mouth Daily. PLEASE CALL THE OFFICE FOR AN APPT 30 tablet 0   • [DISCONTINUED] Phentermine HCl 15 MG tablet dispersible 1 po daily 30 tablet 1     No facility-administered encounter medications on file as of 10/23/2019.        Reviewed use of high risk medication in the elderly: yes  Reviewed for potential of harmful drug interactions in the elderly: yes    Follow Up:  No Follow-up on file.     An After Visit Summary and PPPS with all of these plans were given to the patient.           ++++++++++++++++++++++++++++++++++++++++++++++++++++++++++++++++++     CC:Congestion in am uses nebulizer : COPD   WT management: on phentermine since 2018 lost about 78 lbs on medication. July 1 2018 started. No side effects and works well. Easier to not eat and stable at this time.  Neck pain: intense yard work for 25-30 min and noticed some slight dizziness and noticed this from previous  "disc slippage. Sees chiropractor for years and needs to be adjusted sometimes. Neck pain is constant and tender to touch. Last chiropractor visit was 7-8 days ago. HX of low back pain also. Not on any pain medication for control of pain . Smoking Cessation: wants to quit smoking smokes pipes and little cigars daily. Interested in stopping smoking.  HPI    Review of Systems   Cardiovascular: Negative for chest pain and palpitations.   Respiratory: Positive for shortness of breath.    Musculoskeletal: Positive for neck pain.   Neurological: Negative for headaches.       Social History     Tobacco Use   • Smoking status: Former Smoker     Years: 54.00     Types: Cigars, Electronic Cigarette   • Smokeless tobacco: Never Used   • Tobacco comment: smokes the ecig as well   Substance Use Topics   • Alcohol use: No     O:   Vitals:    10/23/19 1128   BP: 158/80   BP Location: Right arm   Patient Position: Sitting   Cuff Size: Adult   Pulse: 86   Temp: 98.1 °F (36.7 °C)   TempSrc: Oral   SpO2: 98%   Weight: 85.3 kg (188 lb)   Height: 175.3 cm (69\")   PainSc:   4       Physical Exam   Constitutional: He appears well-developed and well-nourished.   Eyes: Conjunctivae are normal. Pupils are equal, round, and reactive to light.   Neck: Normal range of motion.   Cardiovascular: Normal rate, regular rhythm and normal heart sounds.   Pulmonary/Chest: Effort normal. No respiratory distress. He has wheezes.   Abdominal: Bowel sounds are normal. He exhibits no distension and no mass. There is no tenderness. There is no rebound and no guarding.   Musculoskeletal:        Cervical back: He exhibits tenderness and pain.   Tendons in neck tense and muscle tense    Skin: Skin is warm and dry.   Psychiatric: He has a normal mood and affect.       Montana was seen today for med refill and annual exam.    Diagnoses and all orders for this visit:    Encounter for Medicare annual wellness exam  -     Lipid Panel  -     BNP  -     Sedimentation " Rate  -     Comprehensive metabolic panel; Future  -     CBC & Differential    Shortness of breath  -     Discontinue: budesonide-formoterol (SYMBICORT) 160-4.5 MCG/ACT inhaler; Inhale 2 puffs 2 (Two) Times a Day.  -     ipratropium-albuterol (DUO-NEB) 0.5-2.5 mg/3 ml nebulizer; Take 1.5 mL by nebulization 4 (Four) Times a Day.  -     budesonide-formoterol (SYMBICORT) 160-4.5 MCG/ACT inhaler; Inhale 2 puffs 2 (Two) Times a Day.    Chronic obstructive pulmonary disease with acute exacerbation (CMS/[facility])  -     Discontinue: budesonide-formoterol (SYMBICORT) 160-4.5 MCG/ACT inhaler; Inhale 2 puffs 2 (Two) Times a Day.  -     budesonide-formoterol (SYMBICORT) 160-4.5 MCG/ACT inhaler; Inhale 2 puffs 2 (Two) Times a Day.    Essential hypertension  -     hydroCHLOROthiazide (HYDRODIURIL) 25 MG tablet; Take 1 tablet by mouth Daily. PLEASE CALL THE OFFICE FOR AN APPT    Community acquired pneumonia, unspecified laterality  -     ipratropium-albuterol (DUO-NEB) 0.5-2.5 mg/3 ml nebulizer; Take 1.5 mL by nebulization 4 (Four) Times a Day.    Acute exacerbation of chronic obstructive pulmonary disease (COPD) (CMS/[facility])  -     ipratropium-albuterol (DUO-NEB) 0.5-2.5 mg/3 ml nebulizer; Take 1.5 mL by nebulization 4 (Four) Times a Day.    Hypertensive heart disease with heart failure (CMS/[facility])   -     losartan (COZAAR) 100 MG tablet; Take 1 tablet by mouth Daily. PLEASE CALL THE OFFICE FOR AN APPT  -     BNP    Class 1 obesity due to excess calories without serious comorbidity with body mass index (BMI) of 30.0 to 30.9 in adult  -     Phentermine HCl 15 MG tablet dispersible; 1 po daily        No Follow-up on file.  Montana Franco is a current cigars and other nicotine products user.  He currently smokes 1 cigars and pipe bowlful per day for a duration of 50 years. I have educated him on the risk of diseases from using tobacco products such as cancer, COPD and heart diease.     I advised him to quit and he is willing to quit. We have  discussed the following method/s for tobacco cessation:  Education Material Counseling Prescription Medicaiton.  Together we have set a quit date for 1 week from today.  He will follow up with me in 4 weeks or sooner to check on his progress.  Refilled all medications needed.  I spent 10 minutes counseling the patient.

## 2019-10-28 ENCOUNTER — RESULTS ENCOUNTER (OUTPATIENT)
Dept: FAMILY MEDICINE CLINIC | Facility: CLINIC | Age: 69
End: 2019-10-28

## 2019-10-28 DIAGNOSIS — Z00.00 ENCOUNTER FOR MEDICARE ANNUAL WELLNESS EXAM: ICD-10-CM

## 2019-10-28 RX ORDER — VARENICLINE TARTRATE 0.5 MG/1
0.5 TABLET, FILM COATED ORAL 2 TIMES DAILY
Qty: 60 TABLET | Refills: 0 | Status: SHIPPED | OUTPATIENT
Start: 2019-10-28 | End: 2020-11-04

## 2019-11-07 ENCOUNTER — OFFICE VISIT (OUTPATIENT)
Dept: CARDIOLOGY | Facility: CLINIC | Age: 69
End: 2019-11-07

## 2019-11-07 VITALS
HEIGHT: 69 IN | WEIGHT: 179 LBS | BODY MASS INDEX: 26.51 KG/M2 | DIASTOLIC BLOOD PRESSURE: 77 MMHG | HEART RATE: 87 BPM | SYSTOLIC BLOOD PRESSURE: 136 MMHG

## 2019-11-07 DIAGNOSIS — R06.02 SOB (SHORTNESS OF BREATH): ICD-10-CM

## 2019-11-07 DIAGNOSIS — I10 ESSENTIAL HYPERTENSION: ICD-10-CM

## 2019-11-07 DIAGNOSIS — E66.09 CLASS 1 OBESITY DUE TO EXCESS CALORIES WITHOUT SERIOUS COMORBIDITY WITH BODY MASS INDEX (BMI) OF 30.0 TO 30.9 IN ADULT: Primary | ICD-10-CM

## 2019-11-07 PROCEDURE — 93000 ELECTROCARDIOGRAM COMPLETE: CPT | Performed by: INTERNAL MEDICINE

## 2019-11-07 PROCEDURE — 99213 OFFICE O/P EST LOW 20 MIN: CPT | Performed by: INTERNAL MEDICINE

## 2019-11-07 NOTE — PROGRESS NOTES
" Subjective:        Montana Franco is a 69 y.o. male who here for follow up    CC  69-year-old male with known history of hypertension here for the follow-up for shortness of breath and hypertension  HPI  69-year-old male with known history of the benign essential arterial hypertension as well as shortness of breath and obesity here for the follow-up with no complaints of chest pains or tightness no chest     Problem List Items Addressed This Visit        Cardiovascular and Mediastinum    Hypertension       Respiratory    SOB (shortness of breath)       Digestive    Class 1 obesity due to excess calories without serious comorbidity with body mass index (BMI) of 30.0 to 30.9 in adult - Primary        .    The following portions of the patient's history were reviewed and updated as appropriate: allergies, current medications, past family history, past medical history, past social history, past surgical history and problem list.    Past Medical History:   Diagnosis Date   • Cellulitis     LEFT LEG   • COPD (chronic obstructive pulmonary disease) (CMS/[facility])    • Hypertension      reports that he has quit smoking. His smoking use included cigars, electronic cigarette, and pipe. He quit after 54.00 years of use. He has never used smokeless tobacco. He reports that he does not drink alcohol or use drugs.   Family History   Problem Relation Age of Onset   • Hypertension Mother    • Hypertension Father        Review of Systems  Constitutional: No wt loss, fever, fatigue  Gastrointestinal: No nausea, abdominal pain  Behavioral/Psych: No insomnia or anxiety   Cardiovascular shortness of breath  Objective:       Physical Exam  /77 (BP Location: Right arm, Patient Position: Sitting)   Pulse 87   Ht 175.3 cm (69\")   Wt 81.2 kg (179 lb)   BMI 26.43 kg/m²   General appearance: No acute changes   Neck: Trachea midline; NECK, supple, no thyromegaly or lymphadenopathy   Lungs: Normal size and shape, normal breath sounds, equal " distribution of air, no rales and rhonchi   CV: S1-S2 regular, no murmurs, no rub, no gallop   Abdomen: Soft, non-tender; no masses , no abnormal abdominal sounds   Extremities: No deformity , normal color , no peripheral edema   Skin: Normal temperature, turgor and texture; no rash, ulcers            ECG 12 Lead  Date/Time: 11/7/2019 2:05 PM  Performed by: Leonel Power MD  Authorized by: Leonel Power MD   Comparison: compared with previous ECG   Similar to previous ECG  Rhythm: sinus rhythm  ST Flattening: all    Clinical impression: non-specific ECG              Echocardiogram:        Current Outpatient Medications:   •  aspirin 81 MG chewable tablet, Chew 81 mg Daily., Disp: , Rfl:   •  budesonide-formoterol (SYMBICORT) 160-4.5 MCG/ACT inhaler, Inhale 2 puffs 2 (Two) Times a Day., Disp: 3 inhaler, Rfl: 3  •  hydroCHLOROthiazide (HYDRODIURIL) 25 MG tablet, Take 1 tablet by mouth Daily. PLEASE CALL THE OFFICE FOR AN APPT, Disp: 30 tablet, Rfl: 0  •  ipratropium-albuterol (DUO-NEB) 0.5-2.5 mg/3 ml nebulizer, Take 1.5 mL by nebulization 4 (Four) Times a Day., Disp: 360 mL, Rfl: 0  •  losartan (COZAAR) 100 MG tablet, Take 1 tablet by mouth Daily. PLEASE CALL THE OFFICE FOR AN APPT, Disp: 30 tablet, Rfl: 0  •  Phentermine HCl 15 MG tablet dispersible, 1 po daily, Disp: 30 tablet, Rfl: 0  •  varenicline (CHANTIX) 0.5 MG tablet, Take 1 tablet by mouth 2 (Two) Times a Day. Starter pack, Disp: 60 tablet, Rfl: 0   Assessment:        Patient Active Problem List   Diagnosis   • Dry eyes   • Dry mouth   • Low back pain   • Pinched nerve   • Pulmonary emphysema (CMS/HCC)   • Class 1 obesity due to excess calories without serious comorbidity with body mass index (BMI) of 30.0 to 30.9 in adult   • SOB (shortness of breath)   • Leg swelling   • Hypertension               Plan:            ICD-10-CM ICD-9-CM   1. Class 1 obesity due to excess calories without serious comorbidity with body mass index (BMI) of 30.0  to 30.9 in adult E66.09 278.00    Z68.30 V85.30   2. SOB (shortness of breath) R06.02 786.05   3. Essential hypertension I10 401.9     1. Class 1 obesity due to excess calories without serious comorbidity with body mass index (BMI) of 30.0 to 30.9 in adult  Significant risk of obesity to CAD, HTN has been explained  Advantages of wt reduction has been explained      2. SOB (shortness of breath)  Multifactorial    3. Essential hypertension  Blood pressure under control       cv stable    See in 1 yr  COUNSELING:    Montana Madrid was given to patient for the following topics: diagnostic results, risk factor reductions, impressions, risks and benefits of treatment options and importance of treatment compliance .       SMOKING COUNSELING:    [unfilled]    Dictated using Dragon dictation

## 2019-11-19 DIAGNOSIS — I10 ESSENTIAL HYPERTENSION: ICD-10-CM

## 2019-11-19 DIAGNOSIS — I11.0 HYPERTENSIVE HEART DISEASE WITH HEART FAILURE (HCC): ICD-10-CM

## 2019-11-19 RX ORDER — HYDROCHLOROTHIAZIDE 25 MG/1
TABLET ORAL
Qty: 30 TABLET | Refills: 5 | Status: SHIPPED | OUTPATIENT
Start: 2019-11-19 | End: 2020-07-08

## 2019-11-19 RX ORDER — LOSARTAN POTASSIUM 100 MG/1
TABLET ORAL
Qty: 30 TABLET | Refills: 5 | Status: SHIPPED | OUTPATIENT
Start: 2019-11-19 | End: 2020-07-08

## 2019-12-26 DIAGNOSIS — E66.09 CLASS 1 OBESITY DUE TO EXCESS CALORIES WITHOUT SERIOUS COMORBIDITY WITH BODY MASS INDEX (BMI) OF 30.0 TO 30.9 IN ADULT: ICD-10-CM

## 2019-12-27 RX ORDER — PHENTERMINE HYDROCHLORIDE 15 MG/1
CAPSULE ORAL
Qty: 30 CAPSULE | Refills: 0 | Status: SHIPPED | OUTPATIENT
Start: 2019-12-27 | End: 2020-03-04 | Stop reason: SDUPTHER

## 2020-02-24 DIAGNOSIS — J18.9 COMMUNITY ACQUIRED PNEUMONIA, UNSPECIFIED LATERALITY: ICD-10-CM

## 2020-02-24 DIAGNOSIS — J44.1 ACUTE EXACERBATION OF CHRONIC OBSTRUCTIVE PULMONARY DISEASE (COPD) (HCC): ICD-10-CM

## 2020-02-24 DIAGNOSIS — R06.02 SHORTNESS OF BREATH: ICD-10-CM

## 2020-02-25 RX ORDER — IPRATROPIUM BROMIDE AND ALBUTEROL SULFATE 2.5; .5 MG/3ML; MG/3ML
SOLUTION RESPIRATORY (INHALATION)
Qty: 360 ML | Refills: 0 | Status: SHIPPED | OUTPATIENT
Start: 2020-02-25 | End: 2020-04-27

## 2020-02-28 DIAGNOSIS — E66.09 CLASS 1 OBESITY DUE TO EXCESS CALORIES WITHOUT SERIOUS COMORBIDITY WITH BODY MASS INDEX (BMI) OF 30.0 TO 30.9 IN ADULT: ICD-10-CM

## 2020-03-04 DIAGNOSIS — E66.09 CLASS 1 OBESITY DUE TO EXCESS CALORIES WITHOUT SERIOUS COMORBIDITY WITH BODY MASS INDEX (BMI) OF 30.0 TO 30.9 IN ADULT: ICD-10-CM

## 2020-03-04 RX ORDER — PHENTERMINE HYDROCHLORIDE 15 MG/1
CAPSULE ORAL
Qty: 30 CAPSULE | OUTPATIENT
Start: 2020-03-04

## 2020-03-04 RX ORDER — PHENTERMINE HYDROCHLORIDE 15 MG/1
CAPSULE ORAL
Qty: 10 CAPSULE | Refills: 0 | Status: SHIPPED | OUTPATIENT
Start: 2020-03-04 | End: 2020-11-04

## 2020-04-23 DIAGNOSIS — E66.09 CLASS 1 OBESITY DUE TO EXCESS CALORIES WITHOUT SERIOUS COMORBIDITY WITH BODY MASS INDEX (BMI) OF 30.0 TO 30.9 IN ADULT: ICD-10-CM

## 2020-04-24 RX ORDER — PHENTERMINE HYDROCHLORIDE 15 MG/1
CAPSULE ORAL
Qty: 10 CAPSULE | OUTPATIENT
Start: 2020-04-24

## 2020-04-27 DIAGNOSIS — J44.1 ACUTE EXACERBATION OF CHRONIC OBSTRUCTIVE PULMONARY DISEASE (COPD) (HCC): ICD-10-CM

## 2020-04-27 DIAGNOSIS — J18.9 COMMUNITY ACQUIRED PNEUMONIA, UNSPECIFIED LATERALITY: ICD-10-CM

## 2020-04-27 DIAGNOSIS — R06.02 SHORTNESS OF BREATH: ICD-10-CM

## 2020-04-27 RX ORDER — IPRATROPIUM BROMIDE AND ALBUTEROL SULFATE 2.5; .5 MG/3ML; MG/3ML
SOLUTION RESPIRATORY (INHALATION)
Qty: 360 ML | Refills: 0 | Status: SHIPPED | OUTPATIENT
Start: 2020-04-27 | End: 2020-07-08

## 2020-07-07 DIAGNOSIS — J18.9 COMMUNITY ACQUIRED PNEUMONIA, UNSPECIFIED LATERALITY: ICD-10-CM

## 2020-07-07 DIAGNOSIS — J44.1 ACUTE EXACERBATION OF CHRONIC OBSTRUCTIVE PULMONARY DISEASE (COPD) (HCC): ICD-10-CM

## 2020-07-07 DIAGNOSIS — R06.02 SHORTNESS OF BREATH: ICD-10-CM

## 2020-07-07 DIAGNOSIS — I10 ESSENTIAL HYPERTENSION: ICD-10-CM

## 2020-07-07 DIAGNOSIS — I11.0 HYPERTENSIVE HEART DISEASE WITH HEART FAILURE (HCC): ICD-10-CM

## 2020-07-08 RX ORDER — HYDROCHLOROTHIAZIDE 25 MG/1
25 TABLET ORAL DAILY
Qty: 30 TABLET | Refills: 0 | Status: SHIPPED | OUTPATIENT
Start: 2020-07-08 | End: 2020-09-04

## 2020-07-08 RX ORDER — IPRATROPIUM BROMIDE AND ALBUTEROL SULFATE 2.5; .5 MG/3ML; MG/3ML
SOLUTION RESPIRATORY (INHALATION)
Qty: 360 ML | Refills: 0 | Status: SHIPPED | OUTPATIENT
Start: 2020-07-08 | End: 2020-09-08

## 2020-07-08 RX ORDER — LOSARTAN POTASSIUM 100 MG/1
100 TABLET ORAL DAILY
Qty: 30 TABLET | Refills: 0 | Status: SHIPPED | OUTPATIENT
Start: 2020-07-08 | End: 2020-09-04

## 2020-09-03 DIAGNOSIS — I10 ESSENTIAL HYPERTENSION: ICD-10-CM

## 2020-09-03 DIAGNOSIS — I11.0 HYPERTENSIVE HEART DISEASE WITH HEART FAILURE (HCC): ICD-10-CM

## 2020-09-04 RX ORDER — HYDROCHLOROTHIAZIDE 25 MG/1
TABLET ORAL
Qty: 30 TABLET | Refills: 0 | Status: SHIPPED | OUTPATIENT
Start: 2020-09-04 | End: 2020-10-19

## 2020-09-04 RX ORDER — LOSARTAN POTASSIUM 100 MG/1
TABLET ORAL
Qty: 30 TABLET | Refills: 0 | Status: SHIPPED | OUTPATIENT
Start: 2020-09-04 | End: 2020-10-19

## 2020-09-07 DIAGNOSIS — J18.9 COMMUNITY ACQUIRED PNEUMONIA, UNSPECIFIED LATERALITY: ICD-10-CM

## 2020-09-07 DIAGNOSIS — J44.1 ACUTE EXACERBATION OF CHRONIC OBSTRUCTIVE PULMONARY DISEASE (COPD) (HCC): ICD-10-CM

## 2020-09-07 DIAGNOSIS — R06.02 SHORTNESS OF BREATH: ICD-10-CM

## 2020-09-08 RX ORDER — IPRATROPIUM BROMIDE AND ALBUTEROL SULFATE 2.5; .5 MG/3ML; MG/3ML
SOLUTION RESPIRATORY (INHALATION)
Qty: 360 ML | Refills: 0 | Status: SHIPPED | OUTPATIENT
Start: 2020-09-08 | End: 2020-11-04

## 2020-10-17 DIAGNOSIS — I11.0 HYPERTENSIVE HEART DISEASE WITH HEART FAILURE (HCC): ICD-10-CM

## 2020-10-17 DIAGNOSIS — I10 ESSENTIAL HYPERTENSION: ICD-10-CM

## 2020-10-19 RX ORDER — LOSARTAN POTASSIUM 100 MG/1
100 TABLET ORAL DAILY
Qty: 15 TABLET | Refills: 0 | Status: SHIPPED | OUTPATIENT
Start: 2020-10-19 | End: 2020-11-05 | Stop reason: SDUPTHER

## 2020-10-19 RX ORDER — HYDROCHLOROTHIAZIDE 25 MG/1
25 TABLET ORAL DAILY
Qty: 15 TABLET | Refills: 0 | Status: SHIPPED | OUTPATIENT
Start: 2020-10-19 | End: 2020-11-05 | Stop reason: SDUPTHER

## 2020-11-04 ENCOUNTER — OFFICE VISIT (OUTPATIENT)
Dept: FAMILY MEDICINE CLINIC | Facility: CLINIC | Age: 70
End: 2020-11-04

## 2020-11-04 VITALS
BODY MASS INDEX: 33.33 KG/M2 | HEART RATE: 82 BPM | TEMPERATURE: 97.4 F | WEIGHT: 225 LBS | OXYGEN SATURATION: 99 % | SYSTOLIC BLOOD PRESSURE: 128 MMHG | DIASTOLIC BLOOD PRESSURE: 80 MMHG | HEIGHT: 69 IN

## 2020-11-04 DIAGNOSIS — I10 ESSENTIAL HYPERTENSION: ICD-10-CM

## 2020-11-04 DIAGNOSIS — F17.210 CIGARETTE NICOTINE DEPENDENCE WITHOUT COMPLICATION: ICD-10-CM

## 2020-11-04 DIAGNOSIS — R06.02 SHORTNESS OF BREATH: Primary | ICD-10-CM

## 2020-11-04 DIAGNOSIS — Z71.6 ENCOUNTER FOR SMOKING CESSATION COUNSELING: ICD-10-CM

## 2020-11-04 DIAGNOSIS — Z13.220 LIPID SCREENING: ICD-10-CM

## 2020-11-04 DIAGNOSIS — R94.6 ABNORMAL RESULTS OF THYROID FUNCTION STUDIES: ICD-10-CM

## 2020-11-04 DIAGNOSIS — E66.09 CLASS 1 OBESITY DUE TO EXCESS CALORIES WITHOUT SERIOUS COMORBIDITY WITH BODY MASS INDEX (BMI) OF 30.0 TO 30.9 IN ADULT: ICD-10-CM

## 2020-11-04 DIAGNOSIS — J44.9 CHRONIC OBSTRUCTIVE PULMONARY DISEASE, UNSPECIFIED COPD TYPE (HCC): ICD-10-CM

## 2020-11-04 DIAGNOSIS — G89.29 CHRONIC BILATERAL LOW BACK PAIN WITHOUT SCIATICA: ICD-10-CM

## 2020-11-04 DIAGNOSIS — I11.0 HYPERTENSIVE HEART DISEASE WITH HEART FAILURE (HCC): ICD-10-CM

## 2020-11-04 DIAGNOSIS — M54.50 CHRONIC BILATERAL LOW BACK PAIN WITHOUT SCIATICA: ICD-10-CM

## 2020-11-04 PROBLEM — M79.89 LEG SWELLING: Status: RESOLVED | Noted: 2018-03-13 | Resolved: 2020-11-04

## 2020-11-04 PROBLEM — E66.811 CLASS 1 OBESITY DUE TO EXCESS CALORIES WITHOUT SERIOUS COMORBIDITY WITH BODY MASS INDEX (BMI) OF 30.0 TO 30.9 IN ADULT: Status: RESOLVED | Noted: 2018-03-13 | Resolved: 2020-11-04

## 2020-11-04 PROCEDURE — 99214 OFFICE O/P EST MOD 30 MIN: CPT | Performed by: NURSE PRACTITIONER

## 2020-11-04 PROCEDURE — 99406 BEHAV CHNG SMOKING 3-10 MIN: CPT | Performed by: NURSE PRACTITIONER

## 2020-11-04 RX ORDER — TIOTROPIUM BROMIDE AND OLODATEROL 3.124; 2.736 UG/1; UG/1
1 SPRAY, METERED RESPIRATORY (INHALATION)
Qty: 4 G | Refills: 0 | Status: SHIPPED | OUTPATIENT
Start: 2020-11-04

## 2020-11-04 RX ORDER — TIOTROPIUM BROMIDE AND OLODATEROL 3.124; 2.736 UG/1; UG/1
1 SPRAY, METERED RESPIRATORY (INHALATION)
Qty: 4 G | Refills: 0 | Status: CANCELLED | OUTPATIENT
Start: 2020-11-04

## 2020-11-04 RX ORDER — PHENTERMINE HYDROCHLORIDE 15 MG/1
15 CAPSULE ORAL EVERY MORNING
Qty: 30 CAPSULE | Refills: 0 | Status: SHIPPED | OUTPATIENT
Start: 2020-11-04

## 2020-11-04 RX ORDER — ALBUTEROL SULFATE 90 UG/1
2 AEROSOL, METERED RESPIRATORY (INHALATION) EVERY 4 HOURS PRN
Qty: 6.7 G | Refills: 1 | Status: SHIPPED | OUTPATIENT
Start: 2020-11-04

## 2020-11-04 RX ORDER — VARENICLINE TARTRATE 1 MG/1
1 TABLET, FILM COATED ORAL 2 TIMES DAILY
Qty: 56 TABLET | Refills: 4 | Status: SHIPPED | OUTPATIENT
Start: 2020-12-02 | End: 2021-04-21

## 2020-11-04 RX ORDER — BUDESONIDE AND FORMOTEROL FUMARATE DIHYDRATE 160; 4.5 UG/1; UG/1
2 AEROSOL RESPIRATORY (INHALATION)
Refills: 12 | Status: CANCELLED | OUTPATIENT
Start: 2020-11-04

## 2020-11-04 NOTE — PROGRESS NOTES
Subjective   Montana Franco is a 70 y.o. male who presents for an annual follow up on hypertension. Wants to discuss getting back on phentermine.     History of Present Illness     -COPD: Has a lot of thick mucus in am that interferes with breathing. Also constriction during the day w SOB. Uses Duoneb BID thinks helps best, and Symbicort PRN with relief. Smoking few puff of pipe & vaping every hour. Has tried to cut back. Never took Chantix.     -Weight gain: Had lost 80 lbs by 9/2019 during COVID gained over 40 lbs due to having extra load of work and nothing eating healthy. Feels miserable from excess weight. Wanted to go to weight loss center. Has tried keto diet with success.     -Recently retired, want to quit smoking but not quite ready and wants lose weight first.     -HTN: stable. No chest pain, dizziness, headache, swelling. Doing well on medications. Eating generally unhealthy, plans to start healthier diet.       -Lo back pain: Chronic. BEKA lower. No radiating pain down legs. No bowel incontinence. Sees chiropractor weekly, and that helps. Wants PT. Has tried before and helped. Has traction bed, uses heat/ice, percussion massager, helps a little.     -Has neuropathy in [place] hands and legs. Thinks is due to being overweight. Wants treatment for neuropathy. States has heard about anodyne tx and wants to try if effective. Was checking BG frequently last year and usually in 80-90s. No hx DM.     The following portions of the patient's history were reviewed and updated as appropriate: allergies, current medications, past family history, past medical history, past social history, past surgical history and problem list.    Review of Systems   Constitutional: Positive for fatigue. Negative for fever, unexpected weight gain and unexpected weight loss.   Respiratory: Positive for chest tightness and shortness of breath. Negative for cough and wheezing.    Cardiovascular: Negative for chest pain, palpitations and leg  "swelling.   Gastrointestinal: Negative for abdominal pain, constipation and diarrhea.   Endocrine: Negative for polydipsia, polyphagia and polyuria.   Musculoskeletal: Positive for back pain (chronic).   Neurological: Positive for numbness (edil hands, feet). Negative for dizziness, weakness, light-headedness and headache.   Psychiatric/Behavioral: Negative for sleep disturbance and depressed mood.     /80   Pulse 82   Temp 97.4 °F (36.3 °C) (Infrared)   Ht 175.3 cm (69\")   Wt 102 kg (225 lb)   SpO2 99%   BMI 33.23 kg/m²     Objective   Physical Exam  Constitutional:       Appearance: Normal appearance.   HENT:      Head: Normocephalic.   Neck:      Musculoskeletal: Normal range of motion.      Thyroid: No thyroid mass, thyromegaly or thyroid tenderness.   Cardiovascular:      Rate and Rhythm: Normal rate.      Pulses: Normal pulses.           Carotid pulses are 2+ on the right side and 2+ on the left side.     Heart sounds: No murmur.   Pulmonary:      Effort: Pulmonary effort is normal. No respiratory distress.      Breath sounds: Normal breath sounds. No wheezing.   Abdominal:      General: Bowel sounds are normal. There is no distension.   Musculoskeletal:      Lumbar back: He exhibits decreased range of motion. He exhibits no tenderness, no swelling and no edema.        Back:    Skin:     General: Skin is warm and dry.   Neurological:      General: No focal deficit present.      Mental Status: He is alert and oriented to person, place, and time.   Psychiatric:         Mood and Affect: Mood normal.         Behavior: Behavior normal.         Thought Content: Thought content normal.         Judgment: Judgment normal.       Assessment/Plan   Problems Addressed this Visit        Cardiovascular and Mediastinum    Hypertension    Relevant Orders    Comprehensive metabolic panel    CBC w AUTO Differential       Digestive    RESOLVED: Class 1 obesity due to excess calories without serious comorbidity with " body mass index (BMI) of 30.0 to 30.9 in adult    Relevant Medications    phentermine 15 MG capsule    Other Relevant Orders    TSH    Ambulatory Referral to Physical Therapy       Nervous and Auditory    Low back pain      Other Visit Diagnoses     Shortness of breath    -  Primary    Chronic obstructive pulmonary disease with acute exacerbation (CMS/HCC)        Relevant Medications    albuterol sulfate  (90 Base) MCG/ACT inhaler    tiotropium bromide-olodaterol (Stiolto Respimat) 2.5-2.5 MCG/ACT aerosol solution inhaler    Encounter for smoking cessation counseling        Lipid screening        Relevant Orders    Lipid panel    Abnormal results of thyroid function studies         Relevant Orders    TSH      Diagnoses       Codes Comments    Shortness of breath    -  Primary ICD-10-CM: R06.02  ICD-9-CM: 786.05     Essential hypertension     ICD-10-CM: I10  ICD-9-CM: 401.9     Chronic obstructive pulmonary disease with acute exacerbation (CMS/HCC)     ICD-10-CM: J44.1  ICD-9-CM: 491.21     Class 1 obesity due to excess calories without serious comorbidity with body mass index (BMI) of 30.0 to 30.9 in adult     ICD-10-CM: E66.09, Z68.30  ICD-9-CM: 278.00, V85.30     Encounter for smoking cessation counseling     ICD-10-CM: Z71.6  ICD-9-CM: V65.42, 305.1     Lipid screening     ICD-10-CM: Z13.220  ICD-9-CM: V77.91     Abnormal results of thyroid function studies      ICD-10-CM: R94.6  ICD-9-CM: 794.5     Chronic bilateral low back pain without sciatica     ICD-10-CM: M54.5, G89.29  ICD-9-CM: 724.2, 338.29         -COPD: Will switch Symbicort to Stiolto as more appropriate per guidelines. Stop Duo Neb as similar class. Will send Albuterol inhaler PRN.   -Smoking Cessation: discussed implications of smoking on COPD and risk for CVD. States wants to quit and is thinking about but not quite ready yet.   -Weight gain: Lengthy discussion about importance of healthy diet and exercise, contraindications of phentermine  and other treatment options. Will give 1 month supply of phentermine as BP under control. Agreeable to try [employer] program for weight loss. Also states will start on a healthy diet regimen and plans to join the gym. Will check TSH.   -Low back pain: Chronic. Will refer to PT. Handout with home exercises given. May try heat/ice.   -HTN: stable. Continue current regimen. Will check labs.   -Neuropathy: discussed anodyne usually effective only during treatment but does not help nerve regeneration. Defers at this time.     -Over 45 mins spent going over different diagnoses and treatment options during face-to-face visit.       Montana Franco  reports that he has quit smoking. His smoking use included cigars, electronic cigarette, and pipe. He quit after 54.00 years of use. He has never used smokeless tobacco.. I have educated him on the risk of diseases from using tobacco products such as cancer, COPD and heart disease.     I advised him to quit and he is not willing to quit.    I spent 3.5 minutes counseling the patient.    Seen today with DENISSE Mariano student. Agree with assessment and plan. Strong cautions regarding phentermine and cardiac risk, which is already elevated due to age, sex, HTN, smoking. Will consider smoking cessation in 1 month and wants to try chantix. Will send to pharmacy. Update labs for HTN and HLD. Extensive review of gold guidelines and possible increase exacerbations with ICS. Changes made.  I spent over 40 min face to face with pt

## 2020-11-05 DIAGNOSIS — J18.9 COMMUNITY ACQUIRED PNEUMONIA, UNSPECIFIED LATERALITY: ICD-10-CM

## 2020-11-05 DIAGNOSIS — J44.1 ACUTE EXACERBATION OF CHRONIC OBSTRUCTIVE PULMONARY DISEASE (COPD) (HCC): ICD-10-CM

## 2020-11-05 DIAGNOSIS — R06.02 SHORTNESS OF BREATH: ICD-10-CM

## 2020-11-05 LAB
ALBUMIN SERPL-MCNC: 4.4 G/DL (ref 3.5–5.2)
ALBUMIN/GLOB SERPL: 2.4 G/DL
ALP SERPL-CCNC: 68 U/L (ref 39–117)
ALT SERPL-CCNC: 29 U/L (ref 1–41)
AST SERPL-CCNC: 24 U/L (ref 1–40)
BASOPHILS # BLD AUTO: 0.08 10*3/MM3 (ref 0–0.2)
BASOPHILS NFR BLD AUTO: 1.2 % (ref 0–1.5)
BILIRUB SERPL-MCNC: 0.3 MG/DL (ref 0–1.2)
BUN SERPL-MCNC: 21 MG/DL (ref 8–23)
BUN/CREAT SERPL: 18.9 (ref 7–25)
CALCIUM SERPL-MCNC: 9.3 MG/DL (ref 8.6–10.5)
CHLORIDE SERPL-SCNC: 104 MMOL/L (ref 98–107)
CHOLEST SERPL-MCNC: 184 MG/DL (ref 0–200)
CO2 SERPL-SCNC: 29.3 MMOL/L (ref 22–29)
CREAT SERPL-MCNC: 1.11 MG/DL (ref 0.76–1.27)
EOSINOPHIL # BLD AUTO: 0.16 10*3/MM3 (ref 0–0.4)
EOSINOPHIL NFR BLD AUTO: 2.5 % (ref 0.3–6.2)
ERYTHROCYTE [DISTWIDTH] IN BLOOD BY AUTOMATED COUNT: 12.6 % (ref 12.3–15.4)
GLOBULIN SER CALC-MCNC: 1.8 GM/DL
GLUCOSE SERPL-MCNC: 119 MG/DL (ref 65–99)
HCT VFR BLD AUTO: 49.5 % (ref 37.5–51)
HDLC SERPL-MCNC: 58 MG/DL (ref 40–60)
HGB BLD-MCNC: 16.8 G/DL (ref 13–17.7)
IMM GRANULOCYTES # BLD AUTO: 0.01 10*3/MM3 (ref 0–0.05)
IMM GRANULOCYTES NFR BLD AUTO: 0.2 % (ref 0–0.5)
LDLC SERPL CALC-MCNC: 113 MG/DL (ref 0–100)
LYMPHOCYTES # BLD AUTO: 2.01 10*3/MM3 (ref 0.7–3.1)
LYMPHOCYTES NFR BLD AUTO: 31.2 % (ref 19.6–45.3)
MCH RBC QN AUTO: 31.4 PG (ref 26.6–33)
MCHC RBC AUTO-ENTMCNC: 33.9 G/DL (ref 31.5–35.7)
MCV RBC AUTO: 92.5 FL (ref 79–97)
MONOCYTES # BLD AUTO: 0.66 10*3/MM3 (ref 0.1–0.9)
MONOCYTES NFR BLD AUTO: 10.2 % (ref 5–12)
NEUTROPHILS # BLD AUTO: 3.53 10*3/MM3 (ref 1.7–7)
NEUTROPHILS NFR BLD AUTO: 54.7 % (ref 42.7–76)
NRBC BLD AUTO-RTO: 0 /100 WBC (ref 0–0.2)
PLATELET # BLD AUTO: 161 10*3/MM3 (ref 140–450)
POTASSIUM SERPL-SCNC: 5 MMOL/L (ref 3.5–5.2)
PROT SERPL-MCNC: 6.2 G/DL (ref 6–8.5)
RBC # BLD AUTO: 5.35 10*6/MM3 (ref 4.14–5.8)
SODIUM SERPL-SCNC: 140 MMOL/L (ref 136–145)
TRIGL SERPL-MCNC: 72 MG/DL (ref 0–150)
TSH SERPL DL<=0.005 MIU/L-ACNC: 1.7 UIU/ML (ref 0.27–4.2)
VLDLC SERPL CALC-MCNC: 13 MG/DL (ref 5–40)
WBC # BLD AUTO: 6.45 10*3/MM3 (ref 3.4–10.8)

## 2020-11-05 RX ORDER — IPRATROPIUM BROMIDE AND ALBUTEROL SULFATE 2.5; .5 MG/3ML; MG/3ML
SOLUTION RESPIRATORY (INHALATION)
Qty: 360 ML | Refills: 0 | OUTPATIENT
Start: 2020-11-05

## 2020-11-05 RX ORDER — HYDROCHLOROTHIAZIDE 25 MG/1
TABLET ORAL
Qty: 30 TABLET | Refills: 5 | Status: SHIPPED | OUTPATIENT
Start: 2020-11-05

## 2020-11-05 RX ORDER — LOSARTAN POTASSIUM 100 MG/1
100 TABLET ORAL DAILY
Qty: 30 TABLET | Refills: 5 | Status: SHIPPED | OUTPATIENT
Start: 2020-11-05

## 2021-03-16 ENCOUNTER — BULK ORDERING (OUTPATIENT)
Dept: CASE MANAGEMENT | Facility: OTHER | Age: 71
End: 2021-03-16

## 2021-03-16 DIAGNOSIS — Z23 IMMUNIZATION DUE: ICD-10-CM

## 2023-10-30 ENCOUNTER — OFFICE VISIT (OUTPATIENT)
Dept: INFECTIOUS DISEASES | Facility: CLINIC | Age: 73
End: 2023-10-30
Payer: MEDICARE

## 2023-10-30 VITALS
HEIGHT: 67 IN | TEMPERATURE: 98.4 F | BODY MASS INDEX: 30.17 KG/M2 | HEART RATE: 83 BPM | WEIGHT: 192.2 LBS | DIASTOLIC BLOOD PRESSURE: 74 MMHG | SYSTOLIC BLOOD PRESSURE: 145 MMHG | RESPIRATION RATE: 18 BRPM

## 2023-10-30 DIAGNOSIS — L03.119 RECURRENT CELLULITIS OF LOWER EXTREMITY: ICD-10-CM

## 2023-10-30 DIAGNOSIS — M79.89 SWELLING OF LOWER EXTREMITY: Primary | ICD-10-CM

## 2023-10-30 RX ORDER — BUDESONIDE AND FORMOTEROL FUMARATE DIHYDRATE 160; 4.5 UG/1; UG/1
2 AEROSOL RESPIRATORY (INHALATION)
COMMUNITY

## 2023-10-30 RX ORDER — LOSARTAN POTASSIUM 50 MG/1
50 TABLET ORAL DAILY
COMMUNITY

## 2023-10-30 NOTE — PROGRESS NOTES
Referring Provider: Cecilia Franco, APRN  [id number] [place] [address]  Reason for clinic visits: Initial infectious disease clinic visit regarding nonhealing leg wound    HPI: Montana Franco is a 73 y.o. male who presents o the infectious disease clinic with above complaint.  The patient states he has had issues with lower extremity edema for many years now.  He states he developed episodes of cellulitis in 2015 2018 and 2022.  His left leg is always cause more problems than the right but he feels like the episodes of cellulitis were bilateral.  His most recent episode started in August of this year.  He noted a water blister appear on the lateral aspect of his left calf.  This ruptured.  He presented to his primary care APRN who cultured the wound.  It looks like a PCR culture was sent and showed Enterococcus species.  He was initially treated with Bactrim and while he was on the antibiotic another larger blister appeared in that location.  He was then switched to Zyvox.  He was referred to wound care and at this time the wound is completely healed.  He states he has been evaluated by vascular surgery and was told there is nothing that could be done to improve his circulation.  He is also so been seen by podiatry at the beginning of the year but did not have a good experience with them.  He has been given compression stockings/device by wound care and has seen a tremendous decrease in his lower extremity swelling.    Past Medical History:   Diagnosis Date    Cellulitis     LEFT LEG    COPD (chronic obstructive pulmonary disease)     Hypertension        No past surgical history on file.    Social History   reports that he has quit smoking. His smoking use included cigars, electronic cigarette, and pipe. He has never used smokeless tobacco. He reports that he does not drink alcohol and does not use drugs.    Family History  family history includes Hypertension in his father and mother.    No Known  "Allergies    The medication list has been reviewed and updated.     Review of Systems  Pertinent items are noted in HPI, all other systems reviewed and negative    Vital Signs   Vitals:    10/30/23 1015   BP: 145/74   BP Location: Right arm   Patient Position: Sitting   Cuff Size: Large Adult   Pulse: 83   Resp: 18   Temp: 98.4 °F (36.9 °C)   TempSrc: Oral   Weight: 87.2 kg (192 lb 3.2 oz)   Height: 170.2 cm (67\")        Physical Exam:   General: In no acute distress   Respiratory: Breathing comfortably on room air  Musculoskeletal: Normal range of motion, no edema, tenderness or deformity  Skin: No rashes brawny changes to the lower extremities bilaterally, no hair on his legs, mild swelling left greater than right  Neurological: Alert and oriented, moving all 4 extremities  Psychiatric: Normal mood and affect     Lab Results   Component Value Date    WBC 10.60 11/17/2021    HGB 15.3 11/17/2021    HCT 45.9 11/17/2021    MCV 94.6 11/17/2021     11/17/2021       Lab Results   Component Value Date    GLUCOSE 119 (H) 11/04/2020    BUN 21 11/04/2020    CREATININE 1.11 11/04/2020    EGFRIFNONA 66 11/04/2020    EGFRIFAFRI 80 11/04/2020    BCR 18.9 11/04/2020    CO2 29.3 (H) 11/04/2020    CALCIUM 9.3 11/04/2020    PROTENTOTREF 6.2 11/04/2020    ALBUMIN 4.40 11/04/2020    LABIL2 2.4 11/04/2020    AST 24 11/04/2020    ALT 29 11/04/2020       Lab Results   Component Value Date    SEDRATE 11 01/09/2018       Assessment:  This is a 73 y.o. male who presents to clinic today for valuation of nonhealing leg wound.  At this time there is no evidence of infection on physical exam.  His left leg wound has healed with the help of wound care.  Based on the patient's clinical presentation lower extremity edema/circulation seems to be his predominant problem with infection only occurring secondarily.  Additionally given his description of bilateral lower extremity erythema some of the episodes that were deemed as cellulitis might " of been stasis dermatitis.  Talked extensively with the patient about the importance of limiting swelling in his lower extremities and taking care of his skin.  The patient requested I refer him to vascular surgery for second opinion.  He also mentioned to me issues with peripheral neuropathy and he would benefit from podiatry to maintain foot/nail health.    Plan:   Refer to vascular surgery for second opinion regarding his lower extremity circulation  Name of podiatrist was provided to the patient he will call and schedule appointment  At this time there is no indication for antimicrobial therapy    Return to Infectious Disease clinic as needed    Time: More than 50% of time spent in counseling and coordination of care:  Total face-to-face/floor time 60 min.  Time spent in counseling 60 min. Counseling included the following topics: See assessment above

## 2023-11-01 ENCOUNTER — PATIENT ROUNDING (BHMG ONLY) (OUTPATIENT)
Dept: INFECTIOUS DISEASES | Facility: CLINIC | Age: 73
End: 2023-11-01
Payer: MEDICARE

## 2023-11-01 NOTE — PROGRESS NOTES
November 1, 2023    Done in office          We're always looking for ways to make our patients' experiences even better. Do you have recommendations on ways we may improve?  no    Overall were you satisfied with your first visit to our practice? yes

## 2024-04-05 ENCOUNTER — TRANSCRIBE ORDERS (OUTPATIENT)
Dept: ADMINISTRATIVE | Facility: HOSPITAL | Age: 74
End: 2024-04-05
Payer: MEDICARE

## 2024-04-05 DIAGNOSIS — Z87.891 PERSONAL HISTORY OF TOBACCO USE, PRESENTING HAZARDS TO HEALTH: Primary | ICD-10-CM

## 2024-05-07 ENCOUNTER — HOSPITAL ENCOUNTER (OUTPATIENT)
Dept: CT IMAGING | Facility: HOSPITAL | Age: 74
Discharge: HOME | End: 2024-05-07
Admitting: HOSPITALIST
Payer: MEDICARE

## 2024-05-07 DIAGNOSIS — Z87.891 PERSONAL HISTORY OF TOBACCO USE, PRESENTING HAZARDS TO HEALTH: ICD-10-CM

## 2024-05-07 PROCEDURE — 71271 CT THORAX LUNG CANCER SCR C-: CPT

## 2024-06-08 ENCOUNTER — APPOINTMENT (OUTPATIENT)
Dept: GENERAL RADIOLOGY | Facility: HOSPITAL | Age: 74
End: 2024-06-08
Payer: MEDICARE

## 2024-06-08 ENCOUNTER — HOSPITAL ENCOUNTER (EMERGENCY)
Facility: HOSPITAL | Age: 74
Discharge: HOME | End: 2024-06-09
Attending: EMERGENCY MEDICINE | Admitting: EMERGENCY MEDICINE
Payer: MEDICARE

## 2024-06-08 DIAGNOSIS — B34.8 RHINOVIRUS: Primary | ICD-10-CM

## 2024-06-08 LAB
ALBUMIN SERPL-MCNC: 4.2 G/DL (ref 3.5–5.2)
ALBUMIN/GLOB SERPL: 1.4 G/DL
ALP SERPL-CCNC: 68 U/L (ref 39–117)
ALT SERPL W P-5'-P-CCNC: 28 U/L (ref 1–41)
ANION GAP SERPL CALCULATED.3IONS-SCNC: 12.5 MMOL/L (ref 5–15)
AST SERPL-CCNC: 24 U/L (ref 1–40)
B PARAPERT DNA SPEC QL NAA+PROBE: NOT DETECTED
B PERT DNA SPEC QL NAA+PROBE: NOT DETECTED
BASOPHILS # BLD AUTO: 0.03 10*3/MM3 (ref 0–0.2)
BASOPHILS NFR BLD AUTO: 0.4 % (ref 0–1.5)
BILIRUB SERPL-MCNC: 0.4 MG/DL (ref 0–1.2)
BILIRUB UR QL STRIP: NEGATIVE
BUN SERPL-MCNC: 19 MG/DL (ref 8–23)
BUN/CREAT SERPL: 22.1 (ref 7–25)
C PNEUM DNA NPH QL NAA+NON-PROBE: NOT DETECTED
CALCIUM SPEC-SCNC: 9.3 MG/DL (ref 8.6–10.5)
CHLORIDE SERPL-SCNC: 97 MMOL/L (ref 98–107)
CLARITY UR: CLEAR
CO2 SERPL-SCNC: 27.5 MMOL/L (ref 22–29)
COLOR UR: YELLOW
CREAT SERPL-MCNC: 0.86 MG/DL (ref 0.76–1.27)
DEPRECATED RDW RBC AUTO: 46.4 FL (ref 37–54)
EGFRCR SERPLBLD CKD-EPI 2021: 92 ML/MIN/1.73
EOSINOPHIL # BLD AUTO: 0.14 10*3/MM3 (ref 0–0.4)
EOSINOPHIL NFR BLD AUTO: 1.7 % (ref 0.3–6.2)
ERYTHROCYTE [DISTWIDTH] IN BLOOD BY AUTOMATED COUNT: 13.3 % (ref 12.3–15.4)
FLUAV SUBTYP SPEC NAA+PROBE: NOT DETECTED
FLUBV RNA ISLT QL NAA+PROBE: NOT DETECTED
GLOBULIN UR ELPH-MCNC: 2.9 GM/DL
GLUCOSE SERPL-MCNC: 104 MG/DL (ref 65–99)
GLUCOSE UR STRIP-MCNC: NEGATIVE MG/DL
HADV DNA SPEC NAA+PROBE: NOT DETECTED
HCOV 229E RNA SPEC QL NAA+PROBE: NOT DETECTED
HCOV HKU1 RNA SPEC QL NAA+PROBE: NOT DETECTED
HCOV NL63 RNA SPEC QL NAA+PROBE: NOT DETECTED
HCOV OC43 RNA SPEC QL NAA+PROBE: NOT DETECTED
HCT VFR BLD AUTO: 47.6 % (ref 37.5–51)
HGB BLD-MCNC: 15.4 G/DL (ref 13–17.7)
HGB UR QL STRIP.AUTO: NEGATIVE
HMPV RNA NPH QL NAA+NON-PROBE: NOT DETECTED
HOLD SPECIMEN: NORMAL
HOLD SPECIMEN: NORMAL
HPIV1 RNA ISLT QL NAA+PROBE: NOT DETECTED
HPIV2 RNA SPEC QL NAA+PROBE: NOT DETECTED
HPIV3 RNA NPH QL NAA+PROBE: NOT DETECTED
HPIV4 P GENE NPH QL NAA+PROBE: NOT DETECTED
IMM GRANULOCYTES # BLD AUTO: 0.04 10*3/MM3 (ref 0–0.05)
IMM GRANULOCYTES NFR BLD AUTO: 0.5 % (ref 0–0.5)
KETONES UR QL STRIP: ABNORMAL
LEUKOCYTE ESTERASE UR QL STRIP.AUTO: NEGATIVE
LYMPHOCYTES # BLD AUTO: 0.87 10*3/MM3 (ref 0.7–3.1)
LYMPHOCYTES NFR BLD AUTO: 10.7 % (ref 19.6–45.3)
M PNEUMO IGG SER IA-ACNC: NOT DETECTED
MCH RBC QN AUTO: 30 PG (ref 26.6–33)
MCHC RBC AUTO-ENTMCNC: 32.4 G/DL (ref 31.5–35.7)
MCV RBC AUTO: 92.6 FL (ref 79–97)
MONOCYTES # BLD AUTO: 0.92 10*3/MM3 (ref 0.1–0.9)
MONOCYTES NFR BLD AUTO: 11.3 % (ref 5–12)
NEUTROPHILS NFR BLD AUTO: 6.13 10*3/MM3 (ref 1.7–7)
NEUTROPHILS NFR BLD AUTO: 75.4 % (ref 42.7–76)
NITRITE UR QL STRIP: NEGATIVE
NRBC BLD AUTO-RTO: 0 /100 WBC (ref 0–0.2)
NT-PROBNP SERPL-MCNC: 839 PG/ML (ref 0–900)
PH UR STRIP.AUTO: <=5 [PH] (ref 5–8)
PLATELET # BLD AUTO: 203 10*3/MM3 (ref 140–450)
PMV BLD AUTO: 9.5 FL (ref 6–12)
POTASSIUM SERPL-SCNC: 3.9 MMOL/L (ref 3.5–5.2)
PROT SERPL-MCNC: 7.1 G/DL (ref 6–8.5)
PROT UR QL STRIP: NEGATIVE
RBC # BLD AUTO: 5.14 10*6/MM3 (ref 4.14–5.8)
RHINOVIRUS RNA SPEC NAA+PROBE: DETECTED
RSV RNA NPH QL NAA+NON-PROBE: NOT DETECTED
SARS-COV-2 RNA NPH QL NAA+NON-PROBE: NOT DETECTED
SODIUM SERPL-SCNC: 137 MMOL/L (ref 136–145)
SP GR UR STRIP: 1.02 (ref 1–1.03)
TROPONIN T SERPL HS-MCNC: 33 NG/L
TROPONIN T SERPL HS-MCNC: 37 NG/L
UROBILINOGEN UR QL STRIP: ABNORMAL
WBC NRBC COR # BLD AUTO: 8.13 10*3/MM3 (ref 3.4–10.8)
WHOLE BLOOD HOLD COAG: NORMAL
WHOLE BLOOD HOLD SPECIMEN: NORMAL

## 2024-06-08 PROCEDURE — 81003 URINALYSIS AUTO W/O SCOPE: CPT | Performed by: NURSE PRACTITIONER

## 2024-06-08 PROCEDURE — 0202U NFCT DS 22 TRGT SARS-COV-2: CPT | Performed by: NURSE PRACTITIONER

## 2024-06-08 PROCEDURE — 93005 ELECTROCARDIOGRAM TRACING: CPT

## 2024-06-08 PROCEDURE — 71045 X-RAY EXAM CHEST 1 VIEW: CPT

## 2024-06-08 PROCEDURE — 83880 ASSAY OF NATRIURETIC PEPTIDE: CPT

## 2024-06-08 PROCEDURE — 96374 THER/PROPH/DIAG INJ IV PUSH: CPT

## 2024-06-08 PROCEDURE — 84484 ASSAY OF TROPONIN QUANT: CPT | Performed by: NURSE PRACTITIONER

## 2024-06-08 PROCEDURE — 25010000002 METHYLPREDNISOLONE PER 125 MG: Performed by: NURSE PRACTITIONER

## 2024-06-08 PROCEDURE — 94640 AIRWAY INHALATION TREATMENT: CPT

## 2024-06-08 PROCEDURE — 93010 ELECTROCARDIOGRAM REPORT: CPT | Performed by: INTERNAL MEDICINE

## 2024-06-08 PROCEDURE — 94799 UNLISTED PULMONARY SVC/PX: CPT

## 2024-06-08 PROCEDURE — 84484 ASSAY OF TROPONIN QUANT: CPT

## 2024-06-08 PROCEDURE — 94664 DEMO&/EVAL PT USE INHALER: CPT

## 2024-06-08 PROCEDURE — 80053 COMPREHEN METABOLIC PANEL: CPT

## 2024-06-08 PROCEDURE — 36415 COLL VENOUS BLD VENIPUNCTURE: CPT

## 2024-06-08 PROCEDURE — 93005 ELECTROCARDIOGRAM TRACING: CPT | Performed by: EMERGENCY MEDICINE

## 2024-06-08 PROCEDURE — 85025 COMPLETE CBC W/AUTO DIFF WBC: CPT

## 2024-06-08 PROCEDURE — 99284 EMERGENCY DEPT VISIT MOD MDM: CPT

## 2024-06-08 RX ORDER — IPRATROPIUM BROMIDE AND ALBUTEROL SULFATE 2.5; .5 MG/3ML; MG/3ML
3 SOLUTION RESPIRATORY (INHALATION)
Status: DISCONTINUED | OUTPATIENT
Start: 2024-06-08 | End: 2024-06-09 | Stop reason: HOSPADM

## 2024-06-08 RX ORDER — AZITHROMYCIN 500 MG/1
500 TABLET, FILM COATED ORAL DAILY
Qty: 5 TABLET | Refills: 0 | Status: SHIPPED | OUTPATIENT
Start: 2024-06-08

## 2024-06-08 RX ORDER — GUAIFENESIN 600 MG/1
600 TABLET, EXTENDED RELEASE ORAL ONCE
Status: COMPLETED | OUTPATIENT
Start: 2024-06-08 | End: 2024-06-08

## 2024-06-08 RX ORDER — METHYLPREDNISOLONE SODIUM SUCCINATE 125 MG/2ML
125 INJECTION, POWDER, LYOPHILIZED, FOR SOLUTION INTRAMUSCULAR; INTRAVENOUS ONCE
Status: COMPLETED | OUTPATIENT
Start: 2024-06-08 | End: 2024-06-08

## 2024-06-08 RX ORDER — PREDNISONE 20 MG/1
20 TABLET ORAL 2 TIMES DAILY
Qty: 10 TABLET | Refills: 0 | Status: SHIPPED | OUTPATIENT
Start: 2024-06-08 | End: 2024-06-13

## 2024-06-08 RX ORDER — GUAIFENESIN 600 MG/1
1200 TABLET, EXTENDED RELEASE ORAL 2 TIMES DAILY
Qty: 10 TABLET | Refills: 0 | Status: SHIPPED | OUTPATIENT
Start: 2024-06-08

## 2024-06-08 RX ORDER — SODIUM CHLORIDE 0.9 % (FLUSH) 0.9 %
10 SYRINGE (ML) INJECTION AS NEEDED
Status: DISCONTINUED | OUTPATIENT
Start: 2024-06-08 | End: 2024-06-09 | Stop reason: HOSPADM

## 2024-06-08 RX ADMIN — GUAIFENESIN 600 MG: 600 TABLET, EXTENDED RELEASE ORAL at 20:58

## 2024-06-08 RX ADMIN — IPRATROPIUM BROMIDE AND ALBUTEROL SULFATE 3 ML: .5; 3 SOLUTION RESPIRATORY (INHALATION) at 21:58

## 2024-06-08 RX ADMIN — METHYLPREDNISOLONE SODIUM SUCCINATE 125 MG: 125 INJECTION, POWDER, FOR SOLUTION INTRAMUSCULAR; INTRAVENOUS at 20:58

## 2024-06-09 VITALS
OXYGEN SATURATION: 98 % | DIASTOLIC BLOOD PRESSURE: 82 MMHG | RESPIRATION RATE: 18 BRPM | TEMPERATURE: 97.8 F | SYSTOLIC BLOOD PRESSURE: 119 MMHG | HEIGHT: 67 IN | WEIGHT: 190 LBS | HEART RATE: 81 BPM | BODY MASS INDEX: 29.82 KG/M2

## 2024-06-09 LAB
QT INTERVAL: 355 MS
QTC INTERVAL: 440 MS

## 2024-06-09 NOTE — ED PROVIDER NOTES
MD ATTESTATION NOTE     SHARED VISIT: This visit was performed by BOTH a physician and an APC. The substantive portion of the medical decision making was performed by this attesting physician who made or approved the management plan and takes responsibility for patient management. All studies in the APC note (if performed) were independently interpreted by me.  The JOESPH and I have discussed this patient's history, physical exam, and treatment plan. I have reviewed the documentation and personally had a face to face interaction with the patient. I affirm the documentation and agree with the treatment and plan. I provided a substantive portion of the care of the patient.  I personally performed the physical exam in its entirety, and below are my findings.      Brief HPI: Patient complains of shortness of breath for the past several weeks.  He was seen at urgent care on 5/27 for COPD exacerbation.  He was started on steroids and doxycycline.  He began to feel better but then developed a worsening cough yesterday.  He reports scant thick white sputum.  Denies fever, chest pain, abdominal pain, vomiting, or diarrhea.  He has a history of COPD and is on supplemental oxygen as needed.  Denies wheezing.    PHYSICAL EXAM  ED Triage Vitals   Temp Heart Rate Resp BP SpO2   06/08/24 1911 06/08/24 1911 06/08/24 1911 06/08/24 1913 06/08/24 1911   97.8 °F (36.6 °C) 92 22 (!) 196/91 94 %      Temp src Heart Rate Source Patient Position BP Location FiO2 (%)   06/08/24 1911 06/08/24 1911 -- -- --   Tympanic Monitor            GENERAL: Awake, alert, oriented x 3.  Nontoxic-appearing elderly male.  No acute distress  HENT: nares patent  EYES: no scleral icterus  CV: regular rhythm, normal rate  RESPIRATORY: normal effort, decreased breath sounds bilaterally  ABDOMEN: soft, nontender  MUSCULOSKELETAL: Extremities are nontender.  No pedal edema.  NEURO: alert, moves all extremities, follows commands  PSYCH:  calm, cooperative  SKIN: warm,  dry    Vital signs and nursing notes reviewed.        Plan: Obtain labs, chest x-ray, and EKG.  He will be given Solu-Medrol and a DuoNeb treatment.    Chest x-ray personally interpreted by me.  My personal interpretation is: Heart size is normal.  Lungs are clear.  No pleural effusion.    EKG personally interpreted by me.  My personal interpretation is:      EKG time: 1918  Rhythm/Rate: Sinus rhythm, rate 92  P waves and MT: Normal  QRS, axis: Normal  ST and T waves: Normal    Interpreted Contemporaneously by me, independently viewed      ED Course as of 06/09/24 2158   Sat Jun 08, 2024 2020 I reviewed the patient's chest x-ray in PACS, my independent interpretation is no infiltrate, no pneumothorax. [AR]   2056 Patient reported episodes of dysuria and urinary frequency to the RN.  UA ordered. [AR]   2114 I discussed the case with Dr. Pineda and they agree to evaluate the patient at the bedside.    [AR]   2211 HS Troponin T(!): 37  2 hour repeat ordered, no previous for review in epic. [AR]   2242 Human Rhinovirus/Enterovirus(!): Detected [AR]   2334 HS Troponin T(!): 33 [AR]   2350 The patient was reexamined.  They have had symptomatic improvement during their ED stay.  I discussed today's findings with the patient, explaining the pertinent positives and negatives from today's visit, and the plan of care.  Discussed plan for discharge as there is no emergent indication for admission.  Discussed limitation of the ED work-up and that this is to rule out life-threatening emergencies but that they could require further testing as determined by their primary care and or any referred specialist patient is agreeable and understands need for follow-up and repeat exam/testing.  Patient is aware that discharge does not mean there is nothing wrong, indicates no emergency is present, and that they must continue their care with their primary care physician and/or any referred specialist.  They were given appropriate  follow-up with their primary care physician and/or specialist.  I had an extensive discussion on the expected clinical course and return precautions.  Patient understands to return to the emergency department for continuation, worsening, or new symptoms.  I answered any of the patient's questions. Patient was discharged home in a stable condition.     [AR]      ED Course User Index  [AR] Holley Marinelli APRN Holland, William D, MD  06/08/24 1457       Jesus Pineda MD  06/09/24 8177

## 2024-06-09 NOTE — ED NOTES
Pt c/o worsening SOA x2-3 weeks with productive cough x1  day and generalized weakness x1 week. Pt reports having a sore throat earlier this week, and denies exposure to known illness. Pt has hx of COPD and wears 2-3L oxygen PRN. Pt has experienced temporary relief with prednisone. Pt denies chest pain, N/V, fever.

## 2024-06-09 NOTE — ED PROVIDER NOTES
EMERGENCY DEPARTMENT ENCOUNTER  Room Number:  07/07  PCP: Cecilia Franco APRN  Independent Historians: Patient  Time seen by me: 8:08 PM      HPI:  Chief Complaint: had concerns including Shortness of Breath.     A complete HPI/ROS/PMH/PSH/SH/FH are unobtainable due to: None    Chronic or social conditions impacting patient care (Social Determinants of Health): None      Context: [place] Franco is a 73 y.o. male with a medical history of COPD, pulmonary emphysema, hypertension, who presents to the ED with complaints of shortness of breath.  Patient also verbalizes complaints of associated cough.  Patient informs he started feeling bad on March 27, 2024, he was seen at urgent care where he was diagnosed with COPD exacerbation.  He states he was given steroids and doxycycline and started to feel slightly better.  Patient reports 3 days ago he started to feel worse, increasing shortness of breath.  He informs yesterday he developed a productive cough and feels as if there is a lot of mucus in his lungs.  He has oxygen at home to wear as needed.  He states he has had to wear the oxygen when going up and down steps.  Patient denies fever, chills, headache, lightheadedness, dizziness, numbness, tingling, unilateral extremity weakness, syncope, chest pain, abdominal pain, nausea, vomiting, or other complaints.      Review of prior external notes (non-ED) -and- Review of prior external test results outside of this encounter:   May 27, 2024: Urgent care office visit.  Patient was seen for complaints of shortness of breath.  Diagnosed with COPD exacerbation.  Patient given a Medrol Dosepak and a course of doxycycline.      PAST MEDICAL HISTORY  Active Ambulatory Problems     Diagnosis Date Noted    Dry eyes 01/01/2015    Dry mouth 01/01/2016    Low back pain 07/01/1998    Pinched nerve 03/01/2016    Pulmonary emphysema 02/20/2018    SOB (shortness of breath) 03/13/2018    Hypertension 05/17/2018     Resolved Ambulatory  Problems     Diagnosis Date Noted    Class 1 obesity due to excess calories without serious comorbidity with body mass index (BMI) of 30.0 to 30.9 in adult 03/13/2018    Leg swelling 03/13/2018     Past Medical History:   Diagnosis Date    Cellulitis     COPD (chronic obstructive pulmonary disease)          PAST SURGICAL HISTORY  History reviewed. No pertinent surgical history.      FAMILY HISTORY  Family History   Problem Relation Age of Onset    Hypertension Mother     Hypertension Father          SOCIAL HISTORY  Social History     Socioeconomic History    Marital status:    Tobacco Use    Smoking status: Former     Types: Cigars, Electronic Cigarette, Pipe    Smokeless tobacco: Never    Tobacco comments:     smokes the ecig as well   Vaping Use    Vaping status: Never Used   Substance and Sexual Activity    Alcohol use: No    Drug use: No    Sexual activity: Defer         ALLERGIES  Patient has no known allergies.      REVIEW OF SYSTEMS  Included in HPI  All systems reviewed and negative except for those discussed in HPI.      PHYSICAL EXAM    I have reviewed the triage vital signs and nursing notes.    ED Triage Vitals   Temp Heart Rate Resp BP SpO2   06/08/24 1911 06/08/24 1911 06/08/24 1911 06/08/24 1913 06/08/24 1911   97.8 °F (36.6 °C) 92 22 (!) 196/91 94 %      Temp src Heart Rate Source Patient Position BP Location FiO2 (%)   06/08/24 1911 06/08/24 1911 -- -- --   Tympanic Monitor          GENERAL: not distressed  HENT: nares patent  Head/neck/ face are symmetric without gross deformity or swelling  EYES: no scleral icterus  CV: regular rhythm, regular rate with intact distal pulses  RESPIRATORY: normal effort and no respiratory distress  Decreased breath sounds to bilateral lower lobes  ABDOMEN: soft and non-tender  MUSCULOSKELETAL: no deformity  NEURO: alert and appropriate, moves all extremities, follows commands  SKIN: warm, dry    Vital signs and nursing notes reviewed.      LAB RESULTS  Recent  Results (from the past 24 hour(s))   ECG 12 Lead ED Triage Standing Order; SOA    Collection Time: 06/08/24  7:18 PM   Result Value Ref Range    QT Interval 355 ms    QTC Interval 440 ms   Comprehensive Metabolic Panel    Collection Time: 06/08/24  8:15 PM    Specimen: Arm, Left; Blood   Result Value Ref Range    Glucose 104 (H) 65 - 99 mg/dL    BUN 19 8 - 23 mg/dL    Creatinine 0.86 0.76 - 1.27 mg/dL    Sodium 137 136 - 145 mmol/L    Potassium 3.9 3.5 - 5.2 mmol/L    Chloride 97 (L) 98 - 107 mmol/L    CO2 27.5 22.0 - 29.0 mmol/L    Calcium 9.3 8.6 - 10.5 mg/dL    Total Protein 7.1 6.0 - 8.5 g/dL    Albumin 4.2 3.5 - 5.2 g/dL    ALT (SGPT) 28 1 - 41 U/L    AST (SGOT) 24 1 - 40 U/L    Alkaline Phosphatase 68 39 - 117 U/L    Total Bilirubin 0.4 0.0 - 1.2 mg/dL    Globulin 2.9 gm/dL    A/G Ratio 1.4 g/dL    BUN/Creatinine Ratio 22.1 7.0 - 25.0    Anion Gap 12.5 5.0 - 15.0 mmol/L    eGFR 92.0 >60.0 mL/min/1.73   BNP    Collection Time: 06/08/24  8:15 PM    Specimen: Arm, Left; Blood   Result Value Ref Range    proBNP 839.0 0.0 - 900.0 pg/mL   Single High Sensitivity Troponin T    Collection Time: 06/08/24  8:15 PM    Specimen: Arm, Left; Blood   Result Value Ref Range    HS Troponin T 37 (H) <22 ng/L   Green Top (Gel)    Collection Time: 06/08/24  8:15 PM   Result Value Ref Range    Extra Tube Hold for add-ons.    Lavender Top    Collection Time: 06/08/24  8:15 PM   Result Value Ref Range    Extra Tube hold for add-on    Gold Top - SST    Collection Time: 06/08/24  8:15 PM   Result Value Ref Range    Extra Tube Hold for add-ons.    Light Blue Top    Collection Time: 06/08/24  8:15 PM   Result Value Ref Range    Extra Tube Hold for add-ons.    CBC Auto Differential    Collection Time: 06/08/24  8:15 PM    Specimen: Arm, Left; Blood   Result Value Ref Range    WBC 8.13 3.40 - 10.80 10*3/mm3    RBC 5.14 4.14 - 5.80 10*6/mm3    Hemoglobin 15.4 13.0 - 17.7 g/dL    Hematocrit 47.6 37.5 - 51.0 %    MCV 92.6 79.0 - 97.0 fL     MCH 30.0 26.6 - 33.0 pg    MCHC 32.4 31.5 - 35.7 g/dL    RDW 13.3 12.3 - 15.4 %    RDW-SD 46.4 37.0 - 54.0 fl    MPV 9.5 6.0 - 12.0 fL    Platelets 203 140 - 450 10*3/mm3    Neutrophil % 75.4 42.7 - 76.0 %    Lymphocyte % 10.7 (L) 19.6 - 45.3 %    Monocyte % 11.3 5.0 - 12.0 %    Eosinophil % 1.7 0.3 - 6.2 %    Basophil % 0.4 0.0 - 1.5 %    Immature Grans % 0.5 0.0 - 0.5 %    Neutrophils, Absolute 6.13 1.70 - 7.00 10*3/mm3    Lymphocytes, Absolute 0.87 0.70 - 3.10 10*3/mm3    Monocytes, Absolute 0.92 (H) 0.10 - 0.90 10*3/mm3    Eosinophils, Absolute 0.14 0.00 - 0.40 10*3/mm3    Basophils, Absolute 0.03 0.00 - 0.20 10*3/mm3    Immature Grans, Absolute 0.04 0.00 - 0.05 10*3/mm3    nRBC 0.0 0.0 - 0.2 /100 WBC   Urinalysis With Culture If Indicated - Urine, Clean Catch    Collection Time: 06/08/24  8:57 PM    Specimen: Urine, Clean Catch   Result Value Ref Range    Color, UA Yellow Yellow, Straw    Appearance, UA Clear Clear    pH, UA <=5.0 5.0 - 8.0    Specific Gravity, UA 1.019 1.005 - 1.030    Glucose, UA Negative Negative    Ketones, UA 15 mg/dL (1+) (A) Negative    Bilirubin, UA Negative Negative    Blood, UA Negative Negative    Protein, UA Negative Negative    Leuk Esterase, UA Negative Negative    Nitrite, UA Negative Negative    Urobilinogen, UA 0.2 E.U./dL 0.2 - 1.0 E.U./dL         RADIOLOGY  XR Chest 1 View    Result Date: 6/8/2024  SINGLE VIEW OF THE CHEST  HISTORY: Shortness of air  COMPARISON: January 21, 2018  FINDINGS: Heart size is within normal limits. No pneumothorax or pleural effusion is seen. There are background changes of COPD. There is calcification of the aorta. There is some scarring noted at the lung bases bilaterally.      No acute findings.  This report was finalized on 6/8/2024 8:26 PM by Dr. Margoth Bailey M.D on Workstation: [id number]         MEDICATIONS GIVEN IN ER  Medications   sodium chloride 0.9 % flush 10 mL (has no administration in time range)   ipratropium-albuterol  (DUO-NEB) nebulizer solution 3 mL (3 mL Nebulization Given 6/8/24 2158)   methylPREDNISolone sodium succinate (SOLU-Medrol) injection 125 mg (125 mg Intravenous Given 6/8/24 2058)   guaiFENesin (MUCINEX) 12 hr tablet 600 mg (600 mg Oral Given 6/8/24 2058)           OUTPATIENT MEDICATION MANAGEMENT:  Current Facility-Administered Medications Ordered in Epic   Medication Dose Route Frequency Provider Last Rate Last Admin    ipratropium-albuterol (DUO-NEB) nebulizer solution 3 mL  3 mL Nebulization 4x Daily - RT Holley Marinelli APRN   3 mL at 06/08/24 2158    sodium chloride 0.9 % flush 10 mL  10 mL Intravenous PRN Jesus Pineda MD         Current Outpatient Medications Ordered in Epic   Medication Sig Dispense Refill    albuterol sulfate  (90 Base) MCG/ACT inhaler Inhale 2 puffs Every 4 (Four) Hours As Needed for Wheezing. 6.7 g 1    aspirin 81 MG chewable tablet Chew 1 tablet Daily.      budesonide-formoterol (SYMBICORT) 160-4.5 MCG/ACT inhaler Inhale 2 puffs 2 (Two) Times a Day.      hydroCHLOROthiazide (HYDRODIURIL) 25 MG tablet TAKE 1 TABLET BY MOUTH DAILY 30 tablet 5    ipratropium-albuterol (COMBIVENT RESPIMAT)  MCG/ACT inhaler Inhale 1 puff 4 (Four) Times a Day As Needed for Wheezing.      losartan (COZAAR) 50 MG tablet Take 1 tablet by mouth Daily.      methylPREDNISolone (MEDROL) 4 MG dose pack Take as directed on package instructions. 21 each 0    vitamin D3 125 MCG (5000 UT) capsule capsule Take 1 capsule by mouth Daily.           PROGRESS, DATA ANALYSIS, CONSULTS, AND MEDICAL DECISION MAKING  ORDERS PLACED DURING THIS VISIT:  Orders Placed This Encounter   Procedures    Respiratory Panel PCR w/COVID-19(SARS-CoV-2) DEMOND/MICHEL/SP/PAD/COR/NIRMALA In-House, NP Swab in UTM/VTM, 2 HR TAT - Swab, Nasopharynx    XR Chest 1 View    [place] Draw    Comprehensive Metabolic Panel    BNP    Single High Sensitivity Troponin T    CBC Auto Differential    Urinalysis With Culture If Indicated - Urine, Clean  Catch    NPO Diet NPO Type: Strict NPO    Undress & Gown    Continuous Pulse Oximetry    Vital Signs    Oxygen Therapy- Nasal Cannula; Titrate 1-6 LPM Per SpO2; 90 - 95%    ECG 12 Lead ED Triage Standing Order; SOA    Insert Peripheral IV    CBC & Differential    Green Top (Gel)    Lavender Top    Gold Top - SST    Light Blue Top       All labs have been independently interpreted by me.  All radiology studies have been reviewed by me. All EKG's have been independently viewed by me.  Discussion below represents my analysis of pertinent findings related to patient's condition, differential diagnosis, treatment plan and final disposition.    Differential diagnosis includes but is not limited to:   COPD exacerbation, pneumonia, influenza, COVID-19, etc.    ED Course/Progress Notes/MDM:  ED Course as of 06/09/24 0427   Sat Jun 08, 2024 2020 I reviewed the patient's chest x-ray in PACS, my independent interpretation is no infiltrate, no pneumothorax. [AR]   2056 Patient reported episodes of dysuria and urinary frequency to the RN.  UA ordered. [AR]   2114 I discussed the case with Dr. Pineda and they agree to evaluate the patient at the bedside.    [AR]   2211 HS Troponin T(!): 37  2 hour repeat ordered, no previous for review in epic. [AR]   2242 Human Rhinovirus/Enterovirus(!): Detected [AR]   2334 HS Troponin T(!): 33 [AR]   2350 The patient was reexamined.  They have had symptomatic improvement during their ED stay.  I discussed today's findings with the patient, explaining the pertinent positives and negatives from today's visit, and the plan of care.  Discussed plan for discharge as there is no emergent indication for admission.  Discussed limitation of the ED work-up and that this is to rule out life-threatening emergencies but that they could require further testing as determined by their primary care and or any referred specialist patient is agreeable and understands need for follow-up and repeat exam/testing.   Patient is aware that discharge does not mean there is nothing wrong, indicates no emergency is present, and that they must continue their care with their primary care physician and/or any referred specialist.  They were given appropriate follow-up with their primary care physician and/or specialist.  I had an extensive discussion on the expected clinical course and return precautions.  Patient understands to return to the emergency department for continuation, worsening, or new symptoms.  I answered any of the patient's questions. Patient was discharged home in a stable condition.     [AR]      ED Course User Index  [AR] Holley Marinelli APRN           AS OF 22:07 EDT VITALS:    BP - 153/89  HR - 81  TEMP - 97.8 °F (36.6 °C) (Tympanic)  O2 SATS - 99%    MDM:  Patient is a 73-year-old male who presents emergency department complaints of shortness of breath and cough.  Recently on a course of doxycycline with a Medrol Dosepak.  Labs and imaging unremarkable for emergent findings, patient did test positive for human rhinovirus.  Will treat patient with Z-Ted as patient does have COPD and I am concerned the rhinovirus will turn into pneumonia.  Patient was given Solu-Medrol and DuoNebs while in the emergency department, patient sent home on short course of prednisone.  Advise follow-up with PCP.  Vital signs stable.  Strict return precautions given.      COMPLEXITY OF CARE  Admission was considered but after careful review of the patient's presentation, physical examination, diagnostic results, and response to treatment the patient may be safely discharged with outpatient follow-up.        DIAGNOSIS  Final diagnoses:   Rhinovirus         DISPOSITION  ED Disposition       ED Disposition   Discharge    Condition   Stable    Comment   --                    Please note that portions of this document were completed with a voice recognition program.    Note Disclaimer: At [facility], we believe that sharing information  builds trust and better relationships. You are receiving this note because you recently visited [facility]. It is possible you will see health information before a provider has talked with you about it. This kind of information can be easy to misunderstand. To help you fully understand what it means for your health, we urge you to discuss this note with your provider.     Holley Marinelli, DENISSE  06/09/24 0434

## 2024-06-10 ENCOUNTER — PATIENT ROUNDING (BHMG ONLY) (OUTPATIENT)
Dept: URGENT CARE | Facility: CLINIC | Age: 74
End: 2024-06-10
Payer: MEDICARE

## 2024-06-10 NOTE — ED NOTES
Thank you for letting us care for you at your recent visit to [facility]. We would love to hear about your experience with us. Was this the first time you have visited our location?    We’re always looking for ways to make our patients’ experience even better. Do you have any recommendations on ways we may improve?     Please be on the lookout for a survey about your recent visit from Troy Aleman via text or email. We would greatly appreciate if you could fill that out and turn it back in. We want your voice to be heard and we value your feedback.     Thank you for choosing [facility] for your healthcare needs. I appreciate you taking the time to respond!

## 2024-06-18 ENCOUNTER — TRANSCRIBE ORDERS (OUTPATIENT)
Dept: ADMINISTRATIVE | Facility: HOSPITAL | Age: 74
End: 2024-06-18
Payer: MEDICARE

## 2024-06-18 DIAGNOSIS — Z87.891 PERSONAL HISTORY OF TOBACCO USE, PRESENTING HAZARDS TO HEALTH: Primary | ICD-10-CM

## 2024-06-28 ENCOUNTER — TRANSCRIBE ORDERS (OUTPATIENT)
Dept: CARDIAC REHAB | Facility: HOSPITAL | Age: 74
End: 2024-06-28
Payer: MEDICARE

## 2024-06-28 DIAGNOSIS — J44.9 COPD, VERY SEVERE: Primary | ICD-10-CM

## 2024-10-25 ENCOUNTER — APPOINTMENT (OUTPATIENT)
Dept: GENERAL RADIOLOGY | Facility: HOSPITAL | Age: 74
End: 2024-10-25
Payer: MEDICARE

## 2024-10-25 ENCOUNTER — HOSPITAL ENCOUNTER (INPATIENT)
Facility: HOSPITAL | Age: 74
LOS: 5 days | Discharge: NURSING, REHAB OR LONG-TERM CARE | End: 2024-10-31
Attending: EMERGENCY MEDICINE | Admitting: INTERNAL MEDICINE
Payer: MEDICARE

## 2024-10-25 DIAGNOSIS — I89.0 LYMPHEDEMA: ICD-10-CM

## 2024-10-25 DIAGNOSIS — S91.302A OPEN WOUND OF LEFT FOOT, INITIAL ENCOUNTER: ICD-10-CM

## 2024-10-25 DIAGNOSIS — L03.116 CELLULITIS OF LEFT FOOT: Primary | ICD-10-CM

## 2024-10-25 DIAGNOSIS — J43.8 OTHER EMPHYSEMA: ICD-10-CM

## 2024-10-25 DIAGNOSIS — I48.0 PAROXYSMAL ATRIAL FIBRILLATION: ICD-10-CM

## 2024-10-25 LAB
ALBUMIN SERPL-MCNC: 3.8 G/DL (ref 3.5–5.2)
ALBUMIN/GLOB SERPL: 1.4 G/DL
ALP SERPL-CCNC: 66 U/L (ref 39–117)
ALT SERPL W P-5'-P-CCNC: 20 U/L (ref 1–41)
ANION GAP SERPL CALCULATED.3IONS-SCNC: 7.3 MMOL/L (ref 5–15)
AST SERPL-CCNC: 18 U/L (ref 1–40)
BASOPHILS # BLD AUTO: 0.06 10*3/MM3 (ref 0–0.2)
BASOPHILS NFR BLD AUTO: 0.6 % (ref 0–1.5)
BILIRUB SERPL-MCNC: 0.4 MG/DL (ref 0–1.2)
BUN SERPL-MCNC: 29 MG/DL (ref 8–23)
BUN/CREAT SERPL: 29.6 (ref 7–25)
CALCIUM SPEC-SCNC: 9 MG/DL (ref 8.6–10.5)
CHLORIDE SERPL-SCNC: 99 MMOL/L (ref 98–107)
CO2 SERPL-SCNC: 31.7 MMOL/L (ref 22–29)
CREAT SERPL-MCNC: 0.98 MG/DL (ref 0.76–1.27)
D-LACTATE SERPL-SCNC: 0.9 MMOL/L (ref 0.5–2)
DEPRECATED RDW RBC AUTO: 41.9 FL (ref 37–54)
EGFRCR SERPLBLD CKD-EPI 2021: 81.4 ML/MIN/1.73
EOSINOPHIL # BLD AUTO: 0.17 10*3/MM3 (ref 0–0.4)
EOSINOPHIL NFR BLD AUTO: 1.6 % (ref 0.3–6.2)
ERYTHROCYTE [DISTWIDTH] IN BLOOD BY AUTOMATED COUNT: 12.5 % (ref 12.3–15.4)
GLOBULIN UR ELPH-MCNC: 2.8 GM/DL
GLUCOSE SERPL-MCNC: 141 MG/DL (ref 65–99)
HCT VFR BLD AUTO: 38.6 % (ref 37.5–51)
HGB BLD-MCNC: 13.1 G/DL (ref 13–17.7)
IMM GRANULOCYTES # BLD AUTO: 0.04 10*3/MM3 (ref 0–0.05)
IMM GRANULOCYTES NFR BLD AUTO: 0.4 % (ref 0–0.5)
LYMPHOCYTES # BLD AUTO: 0.79 10*3/MM3 (ref 0.7–3.1)
LYMPHOCYTES NFR BLD AUTO: 7.5 % (ref 19.6–45.3)
MCH RBC QN AUTO: 31.2 PG (ref 26.6–33)
MCHC RBC AUTO-ENTMCNC: 33.9 G/DL (ref 31.5–35.7)
MCV RBC AUTO: 91.9 FL (ref 79–97)
MONOCYTES # BLD AUTO: 0.99 10*3/MM3 (ref 0.1–0.9)
MONOCYTES NFR BLD AUTO: 9.3 % (ref 5–12)
NEUTROPHILS NFR BLD AUTO: 8.54 10*3/MM3 (ref 1.7–7)
NEUTROPHILS NFR BLD AUTO: 80.6 % (ref 42.7–76)
NRBC BLD AUTO-RTO: 0 /100 WBC (ref 0–0.2)
PLATELET # BLD AUTO: 157 10*3/MM3 (ref 140–450)
PMV BLD AUTO: 9.7 FL (ref 6–12)
POTASSIUM SERPL-SCNC: 3.5 MMOL/L (ref 3.5–5.2)
PROT SERPL-MCNC: 6.6 G/DL (ref 6–8.5)
RBC # BLD AUTO: 4.2 10*6/MM3 (ref 4.14–5.8)
SODIUM SERPL-SCNC: 138 MMOL/L (ref 136–145)
WBC NRBC COR # BLD AUTO: 10.59 10*3/MM3 (ref 3.4–10.8)

## 2024-10-25 PROCEDURE — 25810000003 SODIUM CHLORIDE 0.9 % SOLUTION: Performed by: PHYSICIAN ASSISTANT

## 2024-10-25 PROCEDURE — 25010000002 CEFTRIAXONE PER 250 MG: Performed by: PHYSICIAN ASSISTANT

## 2024-10-25 PROCEDURE — 25010000002 CEFTRIAXONE PER 250 MG: Performed by: INTERNAL MEDICINE

## 2024-10-25 PROCEDURE — G0378 HOSPITAL OBSERVATION PER HR: HCPCS

## 2024-10-25 PROCEDURE — 99285 EMERGENCY DEPT VISIT HI MDM: CPT

## 2024-10-25 PROCEDURE — 73630 X-RAY EXAM OF FOOT: CPT

## 2024-10-25 PROCEDURE — 25010000002 VANCOMYCIN 1 G RECONSTITUTED SOLUTION 1 EACH VIAL: Performed by: INTERNAL MEDICINE

## 2024-10-25 PROCEDURE — 71045 X-RAY EXAM CHEST 1 VIEW: CPT

## 2024-10-25 PROCEDURE — 36415 COLL VENOUS BLD VENIPUNCTURE: CPT

## 2024-10-25 PROCEDURE — 80053 COMPREHEN METABOLIC PANEL: CPT | Performed by: PHYSICIAN ASSISTANT

## 2024-10-25 PROCEDURE — 85025 COMPLETE CBC W/AUTO DIFF WBC: CPT | Performed by: PHYSICIAN ASSISTANT

## 2024-10-25 PROCEDURE — 87040 BLOOD CULTURE FOR BACTERIA: CPT | Performed by: PHYSICIAN ASSISTANT

## 2024-10-25 PROCEDURE — 25010000002 ENOXAPARIN PER 10 MG: Performed by: INTERNAL MEDICINE

## 2024-10-25 PROCEDURE — 25810000003 SODIUM CHLORIDE 0.9 % SOLUTION 250 ML FLEX CONT: Performed by: INTERNAL MEDICINE

## 2024-10-25 PROCEDURE — 83605 ASSAY OF LACTIC ACID: CPT | Performed by: PHYSICIAN ASSISTANT

## 2024-10-25 PROCEDURE — 25010000002 VANCOMYCIN 10 G RECONSTITUTED SOLUTION: Performed by: PHYSICIAN ASSISTANT

## 2024-10-25 PROCEDURE — 87040 BLOOD CULTURE FOR BACTERIA: CPT | Performed by: INTERNAL MEDICINE

## 2024-10-25 RX ORDER — ACETAMINOPHEN 650 MG/1
650 SUPPOSITORY RECTAL EVERY 4 HOURS PRN
Status: DISCONTINUED | OUTPATIENT
Start: 2024-10-25 | End: 2024-10-31 | Stop reason: HOSPADM

## 2024-10-25 RX ORDER — BUDESONIDE AND FORMOTEROL FUMARATE DIHYDRATE 160; 4.5 UG/1; UG/1
2 AEROSOL RESPIRATORY (INHALATION)
Status: DISCONTINUED | OUTPATIENT
Start: 2024-10-25 | End: 2024-10-31 | Stop reason: HOSPADM

## 2024-10-25 RX ORDER — ASPIRIN 81 MG/1
81 TABLET, CHEWABLE ORAL DAILY
Status: DISCONTINUED | OUTPATIENT
Start: 2024-10-26 | End: 2024-10-31 | Stop reason: HOSPADM

## 2024-10-25 RX ORDER — ONDANSETRON 2 MG/ML
4 INJECTION INTRAMUSCULAR; INTRAVENOUS EVERY 6 HOURS PRN
Status: DISCONTINUED | OUTPATIENT
Start: 2024-10-25 | End: 2024-10-31 | Stop reason: HOSPADM

## 2024-10-25 RX ORDER — ACETAMINOPHEN 325 MG/1
650 TABLET ORAL EVERY 4 HOURS PRN
Status: DISCONTINUED | OUTPATIENT
Start: 2024-10-25 | End: 2024-10-31 | Stop reason: HOSPADM

## 2024-10-25 RX ORDER — LOSARTAN POTASSIUM 50 MG/1
50 TABLET ORAL DAILY
Status: DISCONTINUED | OUTPATIENT
Start: 2024-10-26 | End: 2024-10-29

## 2024-10-25 RX ORDER — ENOXAPARIN SODIUM 100 MG/ML
40 INJECTION SUBCUTANEOUS DAILY
Status: DISCONTINUED | OUTPATIENT
Start: 2024-10-25 | End: 2024-10-31 | Stop reason: HOSPADM

## 2024-10-25 RX ORDER — DEXAMETHASONE 2 MG/1
1 TABLET ORAL DAILY
Status: DISCONTINUED | OUTPATIENT
Start: 2024-10-26 | End: 2024-10-26

## 2024-10-25 RX ORDER — ACETAMINOPHEN 160 MG/5ML
650 SOLUTION ORAL EVERY 4 HOURS PRN
Status: DISCONTINUED | OUTPATIENT
Start: 2024-10-25 | End: 2024-10-31 | Stop reason: HOSPADM

## 2024-10-25 RX ORDER — HYDROCODONE BITARTRATE AND ACETAMINOPHEN 5; 325 MG/1; MG/1
1 TABLET ORAL EVERY 6 HOURS PRN
Status: DISPENSED | OUTPATIENT
Start: 2024-10-25 | End: 2024-10-30

## 2024-10-25 RX ORDER — SODIUM CHLORIDE 0.9 % (FLUSH) 0.9 %
10 SYRINGE (ML) INJECTION EVERY 12 HOURS SCHEDULED
Status: DISCONTINUED | OUTPATIENT
Start: 2024-10-25 | End: 2024-10-31 | Stop reason: HOSPADM

## 2024-10-25 RX ORDER — ALBUTEROL SULFATE 0.83 MG/ML
2.5 SOLUTION RESPIRATORY (INHALATION) EVERY 6 HOURS PRN
Status: DISCONTINUED | OUTPATIENT
Start: 2024-10-25 | End: 2024-10-31 | Stop reason: HOSPADM

## 2024-10-25 RX ORDER — IPRATROPIUM BROMIDE AND ALBUTEROL SULFATE 2.5; .5 MG/3ML; MG/3ML
3 SOLUTION RESPIRATORY (INHALATION) EVERY 6 HOURS PRN
Status: DISCONTINUED | OUTPATIENT
Start: 2024-10-25 | End: 2024-10-26

## 2024-10-25 RX ORDER — CHOLECALCIFEROL (VITAMIN D3) 25 MCG
5000 TABLET ORAL DAILY
Status: DISCONTINUED | OUTPATIENT
Start: 2024-10-26 | End: 2024-10-31 | Stop reason: HOSPADM

## 2024-10-25 RX ORDER — ACETAMINOPHEN 500 MG
1000 TABLET ORAL ONCE
Status: COMPLETED | OUTPATIENT
Start: 2024-10-25 | End: 2024-10-25

## 2024-10-25 RX ORDER — DEXAMETHASONE 1 MG
1 TABLET ORAL DAILY
COMMUNITY

## 2024-10-25 RX ORDER — ACETAMINOPHEN 325 MG/1
650 TABLET ORAL EVERY 6 HOURS PRN
Status: DISCONTINUED | OUTPATIENT
Start: 2024-10-25 | End: 2024-10-25 | Stop reason: SDUPTHER

## 2024-10-25 RX ORDER — SODIUM CHLORIDE 0.9 % (FLUSH) 0.9 %
10 SYRINGE (ML) INJECTION AS NEEDED
Status: DISCONTINUED | OUTPATIENT
Start: 2024-10-25 | End: 2024-10-31 | Stop reason: HOSPADM

## 2024-10-25 RX ADMIN — CEFTRIAXONE SODIUM 1000 MG: 1 INJECTION, POWDER, FOR SOLUTION INTRAMUSCULAR; INTRAVENOUS at 21:49

## 2024-10-25 RX ADMIN — ENOXAPARIN SODIUM 40 MG: 100 INJECTION SUBCUTANEOUS at 21:48

## 2024-10-25 RX ADMIN — SODIUM CHLORIDE 1000 MG: 9 INJECTION, SOLUTION INTRAVENOUS at 21:49

## 2024-10-25 RX ADMIN — VANCOMYCIN HYDROCHLORIDE 1750 MG: 10 INJECTION, POWDER, LYOPHILIZED, FOR SOLUTION INTRAVENOUS at 15:11

## 2024-10-25 RX ADMIN — ACETAMINOPHEN 1000 MG: 500 TABLET, FILM COATED ORAL at 15:05

## 2024-10-25 RX ADMIN — CEFTRIAXONE SODIUM 1000 MG: 1 INJECTION, POWDER, FOR SOLUTION INTRAMUSCULAR; INTRAVENOUS at 14:02

## 2024-10-25 RX ADMIN — Medication 10 ML: at 21:50

## 2024-10-25 RX ADMIN — ACETAMINOPHEN 325MG 650 MG: 325 TABLET ORAL at 18:45

## 2024-10-25 NOTE — PLAN OF CARE
Problem: Adult Inpatient Plan of Care  Goal: Plan of Care Review  Outcome: Progressing  Goal: Patient-Specific Goal (Individualized)  Outcome: Progressing  Goal: Absence of Hospital-Acquired Illness or Injury  Outcome: Progressing  Intervention: Identify and Manage Fall Risk  Recent Flowsheet Documentation  Taken 10/25/2024 1845 by Jeffrey Shaw RN  Safety Promotion/Fall Prevention: safety round/check completed  Taken 10/25/2024 1646 by Jeffrey Shaw RN  Safety Promotion/Fall Prevention: safety round/check completed  Intervention: Prevent Skin Injury  Recent Flowsheet Documentation  Taken 10/25/2024 1646 by Jeffrey Shaw RN  Body Position:   position changed independently   supine  Intervention: Prevent and Manage VTE (Venous Thromboembolism) Risk  Recent Flowsheet Documentation  Taken 10/25/2024 1646 by Jeffrey Shaw RN  VTE Prevention/Management: SCDs (sequential compression devices) off  Goal: Optimal Comfort and Wellbeing  Outcome: Progressing  Intervention: Monitor Pain and Promote Comfort  Recent Flowsheet Documentation  Taken 10/25/2024 1845 by Jeffrey Shaw RN  Pain Management Interventions: pain medication given  Taken 10/25/2024 1646 by Jeffrey Shaw RN  Pain Management Interventions: (atending paged to get an order for pain meds)   pillow support provided   position adjusted  Goal: Readiness for Transition of Care  Outcome: Progressing  Intervention: Mutually Develop Transition Plan  Recent Flowsheet Documentation  Taken 10/25/2024 1645 by Jeffrey Shaw RN  Transportation Anticipated: family or friend will provide  Patient/Family Anticipated Services at Transition:   Patient/Family Anticipates Transition to: home  Taken 10/25/2024 1640 by Jeffrey Shaw RN  Equipment Currently Used at Home:   cane, quad tip   oxygen     Problem: Skin Injury Risk Increased  Goal: Skin Health and Integrity  Outcome: Progressing  Intervention: Optimize Skin  Protection  Recent Flowsheet Documentation  Taken 10/25/2024 1646 by Jeffrey Shaw RN  Activity Management: activity encouraged  Head of Bed (HOB) Positioning: HOB elevated     Problem: Comorbidity Management  Goal: Maintenance of Asthma Control  Outcome: Progressing  Goal: Maintenance of Behavioral Health Symptom Control  Outcome: Progressing  Goal: Maintenance of COPD Symptom Control  Outcome: Progressing  Goal: Blood Glucose Level Within Target Range  Outcome: Progressing  Goal: Maintenance of Heart Failure Symptom Control  Outcome: Progressing  Goal: Blood Pressure in Desired Range  Outcome: Progressing  Goal: Maintenance of Osteoarthritis Symptom Control  Outcome: Progressing  Intervention: Maintain Osteoarthritis Symptom Control  Recent Flowsheet Documentation  Taken 10/25/2024 1646 by Jeffrey Shaw RN  Activity Management: activity encouraged  Goal: Bariatric Home Regimen Maintained  Outcome: Progressing  Goal: Maintenance of Seizure Control  Outcome: Progressing   Goal Outcome Evaluation:

## 2024-10-25 NOTE — ED PROVIDER NOTES
Pt presents to the ED c/o progressive left foot swelling, redness, and pain in association with a lower leg cellulitis unresponsive to Keflex.  Had blistering to the foot which has caused erosion of the skin tissue dorsally.  No fevers.     On exam,   General: No acute distress, nontoxic  HEENT: EOMI  Pulm: Symmetric chest rise, nonlabored breathing  CV: Regular rate and rhythm  GI: Nondistended  MSK: No deformity  Skin: Warm, dry, cellulitis left foot with edema, serous blisters noted bilateral dorsal feet left greater than right with what appears to be removed superficial layer of skin from a ruptured blister with a well-demarcated line of cellulitis in the dorsal foot  Neuro: Awake, alert, oriented x 4, moving all extremities, no focal deficits  Psych: Calm, cooperative    Vital signs and nursing notes reviewed.           Plan: Plan for labs, x-ray, and IV and biotics with plans for hospitalization given failure of outpatient antibiotics and worsening appearance of the foot.    ED Course as of 10/25/24 1600   Fri Oct 25, 2024   1335 First Look: Patient presents to the emergency department after being evaluated by wound care and sent to the ER for left lower extremity infection.  States he developed a large blister on his foot few days ago, it open, subsequently developed a lot of redness and swelling to the foot and lower leg.  Has been on Keflex for a couple days without improvement.  Denies fever nausea vomiting [KA]   1423 I viewed left foot images in PACS.  My independent interpretation is soft tissue swelling but no fracture or dislocation.  [EW]   1500 I discussed presentation, symptoms, labs with MANOLO Malone.  He agrees to admit.  [EW]   1600 WBC: 10.59 [DC]   1600 Hemoglobin: 13.1 [DC]   1600 Lactate: 0.9 [DC]   1600 Glucose(!): 141 [DC]   1600 BUN(!): 29 [DC]   1600 Creatinine: 0.98 [DC]   1600 Sodium: 138 [DC]   1600 Potassium: 3.5 [DC]      ED Course User Index  [DC] Elier Manzo MD  [EW] Cory  DENISSE Forte  [KA] Mague Salomon PA-C       Plan for hospitalization, IV antibiotics have been initiated.  All questions and concerns addressed.     MD Attestation Note    SHARED VISIT: This visit was performed by BOTH a physician and an APC. The substantive portion of the medical decision making was performed by this attesting physician who made or approved the management plan and takes responsibility for patient management. All studies in the APC note (if performed) were independently interpreted by me.                   Elier Manzo MD  10/25/24 1600

## 2024-10-25 NOTE — ED PROVIDER NOTES
EMERGENCY DEPARTMENT ENCOUNTER    Room Number:  P795/1  Date seen:  10/25/2024  Time seen: 14:13 EDT  PCP: Sunshine Ashley APRN  Historian: patient    Discussed/obtained information from independent historians: n/a    HPI:  Chief complaint:wound check, left foot  A complete HPI/ROS/PMH/PSH/SH/FH are unobtainable due to: n/a  Context:Montana Franco is a 74 y.o. male with history of  hypertension, pulmonary emphysema on home oxygen who presents to the ED with c/o redness, swelling, moderate pain and open wound to dorsum of left foot.  Pt states he is established with Wound Care and he developed a large fluid filled blister to dorsum of foot which measured size of baseball the other day.  On Sunday or Monday the blister drained and left him with open wound due to overlying skin being absent.  Wound care had put him on keflex and he has been on this since Wednesday but had appointment today and was advised to come here for IV ABX.  Pt denies any falls or injuries.  He was Covid + on 10/9/2024, denies any different than usual shortness of breath or chest pain.  No fevers or chills. Has had this problem before.     External (non-ED) record review: Patient had a recent ophthalmology office visit October 1 of this year.    Chronic or social conditions impacting care: Patient has a past medical history of cellulitis and COPD    ALLERGIES  Patient has no known allergies.    PAST MEDICAL HISTORY  Active Ambulatory Problems     Diagnosis Date Noted    Dry eyes 01/01/2015    Dry mouth 01/01/2016    Low back pain 07/01/1998    Pinched nerve 03/01/2016    Pulmonary emphysema 02/20/2018    SOB (shortness of breath) 03/13/2018    Hypertension 05/17/2018     Resolved Ambulatory Problems     Diagnosis Date Noted    Class 1 obesity due to excess calories without serious comorbidity with body mass index (BMI) of 30.0 to 30.9 in adult 03/13/2018    Leg swelling 03/13/2018     Past Medical History:   Diagnosis Date    Cellulitis      COPD (chronic obstructive pulmonary disease)        PAST SURGICAL HISTORY  No past surgical history on file.    FAMILY HISTORY  Family History   Problem Relation Age of Onset    Hypertension Mother     Hypertension Father        SOCIAL HISTORY  Social History     Socioeconomic History    Marital status:    Tobacco Use    Smoking status: Former     Types: Cigars, Electronic Cigarette, Pipe    Smokeless tobacco: Never    Tobacco comments:     smokes the ecig as well   Vaping Use    Vaping status: Never Used   Substance and Sexual Activity    Alcohol use: No    Drug use: No    Sexual activity: Defer       REVIEW OF SYSTEMS  Review of Systems    All systems reviewed and negative except for those discussed in HPI.     PHYSICAL EXAM    I have reviewed the triage vital signs and nursing notes.  Vitals:    10/25/24 1606   BP: 157/69   Pulse: 81   Resp: 18   Temp: 98 °F (36.7 °C)   SpO2: 94%     Physical Exam    GENERAL: not distressed, chronically ill-appearing  HENT: nares patent  EYES: no scleral icterus  NECK: no ROM limitations  CV: regular rhythm, mild tachycardia  RESPIRATORY: normal effort, diminished in bases  ABDOMEN: soft  : deferred  MUSCULOSKELETAL:       Images above of the left foot and lower leg.  The DP PT are 2+.  There is some significant crusting between the toes and the remnants of a blister to the dorsum of the left foot.  The tissue does savita.  The area is not malodorous.  There is very focal demarcation between the cellulitic tissue and remainder of the foot.  Sensation is normal    NEURO: alert, moves all extremities, follows commands  SKIN: warm, dry    LAB RESULTS  Recent Results (from the past 24 hours)   Comprehensive Metabolic Panel    Collection Time: 10/25/24  1:22 PM    Specimen: Blood   Result Value Ref Range    Glucose 141 (H) 65 - 99 mg/dL    BUN 29 (H) 8 - 23 mg/dL    Creatinine 0.98 0.76 - 1.27 mg/dL    Sodium 138 136 - 145 mmol/L    Potassium 3.5 3.5 - 5.2 mmol/L    Chloride  99 98 - 107 mmol/L    CO2 31.7 (H) 22.0 - 29.0 mmol/L    Calcium 9.0 8.6 - 10.5 mg/dL    Total Protein 6.6 6.0 - 8.5 g/dL    Albumin 3.8 3.5 - 5.2 g/dL    ALT (SGPT) 20 1 - 41 U/L    AST (SGOT) 18 1 - 40 U/L    Alkaline Phosphatase 66 39 - 117 U/L    Total Bilirubin 0.4 0.0 - 1.2 mg/dL    Globulin 2.8 gm/dL    A/G Ratio 1.4 g/dL    BUN/Creatinine Ratio 29.6 (H) 7.0 - 25.0    Anion Gap 7.3 5.0 - 15.0 mmol/L    eGFR 81.4 >60.0 mL/min/1.73   Lactic Acid, Plasma    Collection Time: 10/25/24  1:22 PM    Specimen: Blood   Result Value Ref Range    Lactate 0.9 0.5 - 2.0 mmol/L   CBC Auto Differential    Collection Time: 10/25/24  1:22 PM    Specimen: Blood   Result Value Ref Range    WBC 10.59 3.40 - 10.80 10*3/mm3    RBC 4.20 4.14 - 5.80 10*6/mm3    Hemoglobin 13.1 13.0 - 17.7 g/dL    Hematocrit 38.6 37.5 - 51.0 %    MCV 91.9 79.0 - 97.0 fL    MCH 31.2 26.6 - 33.0 pg    MCHC 33.9 31.5 - 35.7 g/dL    RDW 12.5 12.3 - 15.4 %    RDW-SD 41.9 37.0 - 54.0 fl    MPV 9.7 6.0 - 12.0 fL    Platelets 157 140 - 450 10*3/mm3    Neutrophil % 80.6 (H) 42.7 - 76.0 %    Lymphocyte % 7.5 (L) 19.6 - 45.3 %    Monocyte % 9.3 5.0 - 12.0 %    Eosinophil % 1.6 0.3 - 6.2 %    Basophil % 0.6 0.0 - 1.5 %    Immature Grans % 0.4 0.0 - 0.5 %    Neutrophils, Absolute 8.54 (H) 1.70 - 7.00 10*3/mm3    Lymphocytes, Absolute 0.79 0.70 - 3.10 10*3/mm3    Monocytes, Absolute 0.99 (H) 0.10 - 0.90 10*3/mm3    Eosinophils, Absolute 0.17 0.00 - 0.40 10*3/mm3    Basophils, Absolute 0.06 0.00 - 0.20 10*3/mm3    Immature Grans, Absolute 0.04 0.00 - 0.05 10*3/mm3    nRBC 0.0 0.0 - 0.2 /100 WBC       Ordered the above labs and independently interpreted results.  My findings will be discussed in the ED course or medical decision making section below    RADIOLOGY RESULTS  XR Chest 1 View    Result Date: 10/25/2024  XR CHEST 1 VW-  HISTORY: Male who is 74 years-old, recent COVID  TECHNIQUE: Frontal views of the chest  COMPARISON: 6/8/2024  FINDINGS: Heart, mediastinum  and pulmonary vasculature are unremarkable. Small bibasilar likely atelectasis or scarring. No focal pulmonary consolidation, pleural effusion, or pneumothorax. Pulmonary hyperinflation suggests COPD, correlate clinically. No acute osseous process.      Small bibasilar likely atelectasis or scarring. Pulmonary hyperinflation. Follow-up as clinical indications persist.  This report was finalized on 10/25/2024 4:01 PM by Dr. Hai Martell M.D on Workstation: [id number]      XR Foot 3+ View Left    Result Date: 10/25/2024  XR FOOT 3+ VW LEFT-  INDICATIONS: Infection.  TECHNIQUE: 3 VIEWS OF THE LEFT FOOT  COMPARISON: None available  FINDINGS:  Soft tissue swelling at the dorsal aspect of the foot may be evidence of inflammation, infection, injury, correlate clinically. No acute fracture, erosion, or dislocation is identified. If there is clinical suspicion for radiographically occult osteomyelitis, MRI or bone scan could be considered.       As described.    This report was finalized on 10/25/2024 2:06 PM by Dr. Hai Martell M.D on Workstation: [id number]        Ordered the above noted radiological studies.  Independently interpreted by me.  My findings will be discussed in the medical decision section below.     PROGRESS, DATA ANALYSIS, CONSULTS AND MEDICAL DECISION MAKING    Please note that this section constitutes my independent interpretation of clinical data including lab results, radiology, EKG's.  This constitutes my independent professional opinion regarding differential diagnosis and management of this patient.  It may include any factors such as history from outside sources, review of external records, social determinants of health, management of medications, response to those treatments, and discussions with other providers.    ED Course as of 10/25/24 1640   Fri Oct 25, 2024   1335 First Look: Patient presents to the emergency department after being evaluated by wound care and sent to the ER for left  lower extremity infection.  States he developed a large blister on his foot few days ago, it open, subsequently developed a lot of redness and swelling to the foot and lower leg.  Has been on Keflex for a couple days without improvement.  Denies fever nausea vomiting [KA]   1423 I viewed left foot images in PACS.  My independent interpretation is soft tissue swelling but no fracture or dislocation.  [EW]   1500 I discussed presentation, symptoms, labs with MANOLO Malone.  He agrees to admit.  [EW]   1600 WBC: 10.59 [DC]   1600 Hemoglobin: 13.1 [DC]   1600 Lactate: 0.9 [DC]   1600 Glucose(!): 141 [DC]   1600 BUN(!): 29 [DC]   1600 Creatinine: 0.98 [DC]   1600 Sodium: 138 [DC]   1600 Potassium: 3.5 [DC]      ED Course User Index  [DC] Elier Manzo MD  [EW] Missy Ray APRN  [KA] Mague Salomon PA-C     Orders placed during this visit:  Orders Placed This Encounter   Procedures    Blood Culture - Blood,    Blood Culture - Blood,    XR Foot 3+ View Left    XR Chest 1 View    Comprehensive Metabolic Panel    Lactic Acid, Plasma    CBC Auto Differential    [facility] (on-call MD unless specified) Details    Initiate Observation Status    CBC & Differential            Medical Decision Making    Pt presents with significant cellulitis to left foot, large open wound from a recent blister eruption.  I considered wound infection, cellulitis.  Doubtful of DVT given no lower extremity tenderness or h/o.  He is otherwise well appearing. Lactate normal. Imaging of left foot without evidence for fracture. I considered but think unlikely this would be osteomyelitis but rather a cellulitis that has not responded to outpatient treatment.  Rocephin and Vancomycin given here and pt admitted.       DIAGNOSIS  Final diagnoses:   Cellulitis of left foot   Open wound of left foot, initial encounter   Other emphysema          Medication List      No changes were made to your prescriptions during this visit.       Admission    Latest  Documented Vital Signs:  As of 16:40 EDT  BP- 157/69 HR- 81 Temp- 98 °F (36.7 °C) (Oral) O2 sat- 94%    Appropriate PPE utilized throughout this patient encounter to include mask, if indicated, per current protocol. Hand hygiene was performed before donning PPE and after removal when leaving the room.    Please note that portions of this were completed with a voice recognition program.     Note Disclaimer: At [facility], we believe that sharing information builds trust and better relationships. You are receiving this note because you are receiving care at [facility] or recently visited. It is possible you will see health information before a provider has talked with you about it. This kind of information can be easy to misunderstand. To help you fully understand what it means for your health, we urge you to discuss this note with your provider.                 Missy Ray, APRN  10/25/24 0393

## 2024-10-25 NOTE — ED NOTES
"Nursing report ED to floor  Montana Franco  74 y.o.  male    HPI :  HPI  Stated Reason for Visit: Left lower leg redness and swelling for 4 months  History Obtained From: patient  Precipitating Event(s): none  Medications/Treatments Prior to Arrival: none    Chief Complaint  Chief Complaint   Patient presents with    Wound Check       Admitting doctor:   Joshua Novoa MD    Admitting diagnosis:   The primary encounter diagnosis was Cellulitis of left foot. Diagnoses of Open wound of left foot, initial encounter and Other emphysema were also pertinent to this visit.    Code status:   Current Code Status       Date Active Code Status Order ID Comments User Context       Not on file            Allergies:   Patient has no known allergies.    Isolation:   No active isolations    Intake and Output    Intake/Output Summary (Last 24 hours) at 10/25/2024 1505  Last data filed at 10/25/2024 1505  Gross per 24 hour   Intake 100 ml   Output --   Net 100 ml       Weight:       10/25/24  1252   Weight: 86.2 kg (190 lb 0.6 oz)       Most recent vitals:   Vitals:    10/25/24 1252 10/25/24 1300 10/25/24 1413 10/25/24 1414   BP: 149/77   136/83   BP Location: Right arm      Patient Position: Sitting      Pulse: 85   (!) 122   Resp: 18      Temp: 98.2 °F (36.8 °C)  98.1 °F (36.7 °C)    TempSrc: Oral  Tympanic    SpO2: 98%   96%   Weight: 86.2 kg (190 lb 0.6 oz)      Height: 107.2 cm (42.21\") 170.2 cm (67\")         Active LDAs/IV Access:   Lines, Drains & Airways       Active LDAs       Name Placement date Placement time Site Days    Peripheral IV 10/25/24 1402 Anterior;Proximal;Right Forearm 10/25/24  1402  Forearm  less than 1                    Labs (abnormal labs have a star):   Labs Reviewed   COMPREHENSIVE METABOLIC PANEL - Abnormal; Notable for the following components:       Result Value    Glucose 141 (*)     BUN 29 (*)     CO2 31.7 (*)     BUN/Creatinine Ratio 29.6 (*)     All other components within normal limits    Narrative:  "    GFR Normal >60  Chronic Kidney Disease <60  Kidney Failure <15    The GFR formula is only valid for adults with stable renal function between ages 18 and 70.   CBC WITH AUTO DIFFERENTIAL - Abnormal; Notable for the following components:    Neutrophil % 80.6 (*)     Lymphocyte % 7.5 (*)     Neutrophils, Absolute 8.54 (*)     Monocytes, Absolute 0.99 (*)     All other components within normal limits   LACTIC ACID, PLASMA - Normal   BLOOD CULTURE   BLOOD CULTURE   CBC AND DIFFERENTIAL    Narrative:     The following orders were created for panel order CBC & Differential.  Procedure                               Abnormality         Status                     ---------                               -----------         ------                     CBC Auto Differential[[id number]]        Abnormal            Final result                 Please view results for these tests on the individual orders.       EKG:   No orders to display       Meds given in ED:   Medications   vancomycin 1750 mg/500 mL 0.9% NS IVPB ([facility]) (has no administration in time range)   acetaminophen (TYLENOL) tablet 1,000 mg (has no administration in time range)   cefTRIAXone (ROCEPHIN) 1,000 mg in sodium chloride 0.9 % 100 mL MBP (0 mg Intravenous Stopped 10/25/24 1505)       Imaging results:  XR Foot 3+ View Left    Result Date: 10/25/2024   As described.    This report was finalized on 10/25/2024 2:06 PM by Dr. Hai Martell M.D on Workstation: [email or web address]       Ambulatory status:   - standby    Social issues:   Social History     Socioeconomic History    Marital status:    Tobacco Use    Smoking status: Former     Types: Cigars, Electronic Cigarette, Pipe    Smokeless tobacco: Never    Tobacco comments:     smokes the ecig as well   Vaping Use    Vaping status: Never Used   Substance and Sexual Activity    Alcohol use: No    Drug use: No    Sexual activity: Defer       Peripheral Neurovascular  Peripheral Neurovascular (Adult)  Peripheral  Neurovascular WDL: WDL    Neuro Cognitive  Neuro Cognitive (Adult)  Cognitive/Neuro/Behavioral WDL: WDL    Learning  Learning Assessment  Learning Readiness and Ability: no barriers identified    Respiratory  Respiratory WDL  Respiratory WDL: WDL    Abdominal Pain       Pain Assessments  Pain (Adult)  (0-10) Pain Rating: Rest: 6  (0-10) Pain Rating: Activity: 6  Pain Location: foot  Pain Side/Orientation: left    NIH Stroke Scale       Rosaura Herring RN  10/25/24 15:05 EDT

## 2024-10-25 NOTE — H&P
Internal medicine history and physical  INTERNAL MEDICINE   [facility]       Patient Identification:  Name: Montana Franco  Age: 74 y.o.  Sex: male  :  1950  MRN: [id number]                   Primary Care Physician: Sunshine Ashley APRN                               Date of admission:10/25/2024    Chief Complaint: Worsening pain and redness of the left foot since Wednesday.    History of Present Illness:   Source of information patient himself, review of records, and review of the pictures of his left foot blister since last week.  Patient is a 74-year-old male with past medical history remarkable for COPD with chronic hypoxia on oxygen supplementation and chronic steroid, history of hypertension, chronic venous stasis of lower extremities and has been using Unna boot type wrapping and dressing to his left leg.  According to him last week after leaving the dressing on his left leg for more than a week he removed the dressing because his foot was getting more swollen and noticed a blister on the top of his left foot without any redness around it.  Patient was eventually seen by wound care provider on 10/21/2024 and his blister was ruptured and large amount of clear fluid was removed.  Patient subsequently had dressing in place on the left foot and leg.  By Wednesday patient started complaining of significant pain and discomfort on his left foot for which he called his wound care provider and was prescribed Keflex.  Patient was recommended to be seen in the clinic.  According the patient by that time he was noticing some increasing redness on the top of the foot.  Despite taking antibiotics his pain and discomfort continued to get worse and today he went for the follow-up at the wound care clinic and was recommended inpatient admission for IV antibiotics and further evaluation.  Patient denies any other symptoms such as fever chills decrease in appetite or change in the pattern of his  breathing in terms of cough or wheezing.  Blood cultures are drawn and patient has been started on ceftriaxone and vancomycin.  Patient has been seen at the infectious disease clinic at [facility] infectious disease associated in October 2023 for ruptured blister on the lateral aspect of left calf with secondary infection which grew Enterococcus and patient was treated with oral Zyvox.  By the time patient was seen by infectious disease providers on 10/30/2024 and was noted to be free of infection during that visit.  Possibility of venous stasis/stasis dermatitis was raised as explanation for his episodic erythema.  Patient was recommended to have vascular surgery follow-up and podiatry service referral.  Patient was not considered to be a candidate for any antibiotic therapy because of healing of the calf wound and resolution of infection.      Past Medical History:  Past Medical History:   Diagnosis Date    Cellulitis     LEFT LEG    COPD (chronic obstructive pulmonary disease)     Hypertension      Past Surgical History:  History reviewed. No pertinent surgical history.   Home Meds:  Medications Prior to Admission   Medication Sig Dispense Refill Last Dose/Taking    dexAMETHasone (DECADRON) 1 MG tablet Take 1 tablet by mouth Daily.   10/25/2024 at  8:00 AM    albuterol sulfate  (90 Base) MCG/ACT inhaler Inhale 2 puffs Every 4 (Four) Hours As Needed for Wheezing. 6.7 g 1 10/24/2024 at 10:00 PM    aspirin 81 MG chewable tablet Chew 1 tablet Daily.   10/25/2024 at  8:00 AM    azithromycin (ZITHROMAX) 500 MG tablet Take 1 tablet by mouth Daily. 5 tablet 0     budesonide-formoterol (SYMBICORT) 160-4.5 MCG/ACT inhaler Inhale 2 puffs 2 (Two) Times a Day.   10/25/2024 at  8:00 AM    guaiFENesin (MUCINEX) 600 MG 12 hr tablet Take 2 tablets by mouth 2 (Two) Times a Day. (Patient taking differently: Take 200 mg by mouth Every 4 (Four) Hours As Needed.) 10 tablet 0 10/25/2024 at 10:45 AM    hydroCHLOROthiazide  "(HYDRODIURIL) 25 MG tablet TAKE 1 TABLET BY MOUTH DAILY (Patient taking differently: Take 1.5 tablets by mouth Daily.) 30 tablet 5 10/25/2024 at  8:00 AM    ipratropium-albuterol (COMBIVENT RESPIMAT)  MCG/ACT inhaler Inhale 1 puff 4 (Four) Times a Day As Needed for Wheezing.   10/25/2024 at  8:00 AM    losartan (COZAAR) 50 MG tablet Take 1 tablet by mouth Daily.   10/25/2024    vitamin D3 125 MCG (5000 UT) capsule capsule Take 1 capsule by mouth Daily.   10/25/2024 at  8:00 AM     Current Meds:     Current Facility-Administered Medications:     acetaminophen (TYLENOL) tablet 650 mg, 650 mg, Oral, Q6H PRN, Joshua Novoa MD  Allergies:  No Known Allergies  Social History:   Social History     Tobacco Use    Smoking status: Former     Types: Cigars, Electronic Cigarette, Pipe    Smokeless tobacco: Never    Tobacco comments:     smokes the ecig as well   Substance Use Topics    Alcohol use: No      Family History:  Family History   Problem Relation Age of Onset    Hypertension Mother     Hypertension Father           Review of Systems  See history of present illness and past medical history.        Vitals:   /69 (BP Location: Right arm, Patient Position: Lying)   Pulse 81   Temp 98 °F (36.7 °C) (Oral)   Resp 18   Ht 170.2 cm (67\")   Wt 86.2 kg (190 lb 0.6 oz)   SpO2 94%   BMI 29.76 kg/m²   I/O:   Intake/Output Summary (Last 24 hours) at 10/25/2024 1844  Last data filed at 10/25/2024 1700  Gross per 24 hour   Intake 100 ml   Output 200 ml   Net -100 ml     Exam:  Patient is examined using the personal protective equipment as per guidelines from infection control for this particular patient as enacted.  Hand washing was performed before and after patient interaction.  General Appearance:    Alert, cooperative, no distress, appears stated age   Head:    Normocephalic, without obvious abnormality, atraumatic   Eyes:    PERRL, conjunctiva/corneas clear, EOM's intact, both eyes   Ears:    Normal external " ear canals, both ears   Nose:   Nares normal, septum midline, mucosa normal, no drainage    or sinus tenderness   Throat:   Lips, tongue, gums normal; oral mucosa pink and moist   Neck: Supple and no adenopathy   Back:     Symmetric, no curvature, ROM normal, no CVA tenderness   Lungs:   Occasional rhonchi but no obvious use of accessory muscles of breathing noted   Chest Wall:    No tenderness or deformity    Heart:  S1-S2 regular   Abdomen:   Soft nontender   Extremities:                    Pulses: Capillary refill intact.   Skin: Very patterned erythema with a sharp demarcated line proximally near the ankle noted.   Neurologic: Alert and oriented x 3       Data Review:      I reviewed the patient's new clinical results.  Results from last 7 days   Lab Units 10/25/24  1322   WBC 10*3/mm3 10.59   HEMOGLOBIN g/dL 13.1   PLATELETS 10*3/mm3 157     Results from last 7 days   Lab Units 10/25/24  1322   SODIUM mmol/L 138   POTASSIUM mmol/L 3.5   CHLORIDE mmol/L 99   CO2 mmol/L 31.7*   BUN mg/dL 29*   CREATININE mg/dL 0.98   CALCIUM mg/dL 9.0   GLUCOSE mg/dL 141*     XR Chest 1 View    Result Date: 10/25/2024  Small bibasilar likely atelectasis or scarring. Pulmonary hyperinflation. Follow-up as clinical indications persist.  This report was finalized on 10/25/2024 4:01 PM by Dr. Hai Martell M.D on Workstation: [id number]      XR Foot 3+ View Left    Result Date: 10/25/2024   As described.    This report was finalized on 10/25/2024 2:06 PM by Dr. Hai Martell M.D on Workstation: [id number]         Assessment:  Active Hospital Problems    Diagnosis  POA    **Cellulitis of left foot [L03.116]  Yes    Hypertension [I10]  Yes    Pulmonary emphysema [J43.9]  Yes    Low back pain [M54.50]  Yes   Chronic hypoxic respiratory failure on oxygen replacement  Chronic left lower extremity venous stasis/lymphedema and prior history of fluid blister with rupture.    Medical decision making/care plan: See admitting  orders  Worsening pain and erythema on the dorsum of the left foot following rupture of the fluid-filled blister will be treated locally with wound care and dressing and has peculiar pattern and/sharp demarcating proximal border of erythema on the left foot-failing out of hospital Keflex-this presentation could very well be cellulitis of the left foot due to secondary infection of the infected blister with open wound versus contact dermatitis to the dressing material applied on the left forefoot to address ruptured blister given the sharp line of erythema on the left foot.  Moreover he also does not have leukocytosis or systemic symptoms of sepsis.  Plan is to follow-up on blood cultures, elevate his lower extremities, empirically start him on broad-spectrum antibiotic therapy and consider MRI of the foot given the pain that he is describing.  Plain x-ray did not show any acute pathology.  Low threshold to de-escalate antibiotic therapy and consider local treatment for contact dermatitis/stasis dermatitis.  Check MRSA screen and further de-escalate antibiotic therapy.  COPD/emphysema with chronic hypoxic respiratory failure currently stable-continue his home oxygen supplementation and as needed nebulizer treatment.  Provided with continuous pulse ox monitoring.  Hypertension-continue his antihypertensive regimen and avoid hypotensive episode.    Joshua Novoa MD   10/25/2024  18:44 EDT    Parts of this note may be an electronic transcription/translation of spoken language to printed text using the Dragon dictation system.

## 2024-10-25 NOTE — LETTER
EMS Transport Request  For use at [facility], [place], [place], [place], and [place] only   Patient Name: Montana Franco : 1950   Weight:86.2 kg (190 lb) [facility] BLS/ALS: BLS/ALS: BLS   Insurance: MEDICARE Auth End Date:    Pre-Cert #: D/C Summary complete:    Destination: Other [address]    Contact Precautions: None   Equipment (O2, Fluids, etc.): None   Arrive By Date/Time: 10/31/2024 1300 Stretcher/WC: Wheelchair   CM Requesting: Mariely Joseph RN Ext: [phone]   Notes/Medical Necessity: Pt can transfer to [facility]      ______________________________________________________________________    *Only 2 patient bags OR 1 carry-on size bag are permitted.  Wheelchairs and walkers CANNOT transported with the patient. Acknowledge: Yes

## 2024-10-26 ENCOUNTER — APPOINTMENT (OUTPATIENT)
Dept: MRI IMAGING | Facility: HOSPITAL | Age: 74
End: 2024-10-26
Payer: MEDICARE

## 2024-10-26 LAB
ALBUMIN SERPL-MCNC: 3.6 G/DL (ref 3.5–5.2)
ALBUMIN/GLOB SERPL: 1.3 G/DL
ALP SERPL-CCNC: 62 U/L (ref 39–117)
ALT SERPL W P-5'-P-CCNC: 19 U/L (ref 1–41)
ANION GAP SERPL CALCULATED.3IONS-SCNC: 8.1 MMOL/L (ref 5–15)
AST SERPL-CCNC: 17 U/L (ref 1–40)
BASOPHILS # BLD AUTO: 0.06 10*3/MM3 (ref 0–0.2)
BASOPHILS NFR BLD AUTO: 0.8 % (ref 0–1.5)
BILIRUB SERPL-MCNC: <0.2 MG/DL (ref 0–1.2)
BUN SERPL-MCNC: 18 MG/DL (ref 8–23)
BUN/CREAT SERPL: 23.1 (ref 7–25)
CALCIUM SPEC-SCNC: 8.6 MG/DL (ref 8.6–10.5)
CHLORIDE SERPL-SCNC: 101 MMOL/L (ref 98–107)
CO2 SERPL-SCNC: 31.9 MMOL/L (ref 22–29)
CREAT SERPL-MCNC: 0.78 MG/DL (ref 0.76–1.27)
DEPRECATED RDW RBC AUTO: 40.3 FL (ref 37–54)
EGFRCR SERPLBLD CKD-EPI 2021: 94.2 ML/MIN/1.73
EOSINOPHIL # BLD AUTO: 0.23 10*3/MM3 (ref 0–0.4)
EOSINOPHIL NFR BLD AUTO: 3 % (ref 0.3–6.2)
ERYTHROCYTE [DISTWIDTH] IN BLOOD BY AUTOMATED COUNT: 12.1 % (ref 12.3–15.4)
GLOBULIN UR ELPH-MCNC: 2.7 GM/DL
GLUCOSE SERPL-MCNC: 146 MG/DL (ref 65–99)
HCT VFR BLD AUTO: 37.8 % (ref 37.5–51)
HGB BLD-MCNC: 12.4 G/DL (ref 13–17.7)
IMM GRANULOCYTES # BLD AUTO: 0.03 10*3/MM3 (ref 0–0.05)
IMM GRANULOCYTES NFR BLD AUTO: 0.4 % (ref 0–0.5)
LYMPHOCYTES # BLD AUTO: 1.1 10*3/MM3 (ref 0.7–3.1)
LYMPHOCYTES NFR BLD AUTO: 14.2 % (ref 19.6–45.3)
MAGNESIUM SERPL-MCNC: 2.3 MG/DL (ref 1.6–2.4)
MCH RBC QN AUTO: 29.7 PG (ref 26.6–33)
MCHC RBC AUTO-ENTMCNC: 32.8 G/DL (ref 31.5–35.7)
MCV RBC AUTO: 90.6 FL (ref 79–97)
MONOCYTES # BLD AUTO: 0.82 10*3/MM3 (ref 0.1–0.9)
MONOCYTES NFR BLD AUTO: 10.6 % (ref 5–12)
MRSA DNA SPEC QL NAA+PROBE: NORMAL
NEUTROPHILS NFR BLD AUTO: 5.49 10*3/MM3 (ref 1.7–7)
NEUTROPHILS NFR BLD AUTO: 71 % (ref 42.7–76)
NRBC BLD AUTO-RTO: 0 /100 WBC (ref 0–0.2)
PLATELET # BLD AUTO: 161 10*3/MM3 (ref 140–450)
PMV BLD AUTO: 9.9 FL (ref 6–12)
POTASSIUM SERPL-SCNC: 3.2 MMOL/L (ref 3.5–5.2)
PROT SERPL-MCNC: 6.3 G/DL (ref 6–8.5)
RBC # BLD AUTO: 4.17 10*6/MM3 (ref 4.14–5.8)
SODIUM SERPL-SCNC: 141 MMOL/L (ref 136–145)
VANCOMYCIN TROUGH SERPL-MCNC: 4.7 MCG/ML (ref 5–20)
WBC NRBC COR # BLD AUTO: 7.73 10*3/MM3 (ref 3.4–10.8)

## 2024-10-26 PROCEDURE — 94761 N-INVAS EAR/PLS OXIMETRY MLT: CPT

## 2024-10-26 PROCEDURE — 94664 DEMO&/EVAL PT USE INHALER: CPT

## 2024-10-26 PROCEDURE — 94640 AIRWAY INHALATION TREATMENT: CPT

## 2024-10-26 PROCEDURE — 0 GADOBENATE DIMEGLUMINE 529 MG/ML SOLUTION: Performed by: STUDENT IN AN ORGANIZED HEALTH CARE EDUCATION/TRAINING PROGRAM

## 2024-10-26 PROCEDURE — 25010000002 ENOXAPARIN PER 10 MG: Performed by: INTERNAL MEDICINE

## 2024-10-26 PROCEDURE — 80202 ASSAY OF VANCOMYCIN: CPT | Performed by: INTERNAL MEDICINE

## 2024-10-26 PROCEDURE — 25810000003 SODIUM CHLORIDE 0.9 % SOLUTION 250 ML FLEX CONT: Performed by: INTERNAL MEDICINE

## 2024-10-26 PROCEDURE — 83735 ASSAY OF MAGNESIUM: CPT | Performed by: NURSE PRACTITIONER

## 2024-10-26 PROCEDURE — 73720 MRI LWR EXTREMITY W/O&W/DYE: CPT

## 2024-10-26 PROCEDURE — A9577 INJ MULTIHANCE: HCPCS | Performed by: STUDENT IN AN ORGANIZED HEALTH CARE EDUCATION/TRAINING PROGRAM

## 2024-10-26 PROCEDURE — 97116 GAIT TRAINING THERAPY: CPT

## 2024-10-26 PROCEDURE — 99223 1ST HOSP IP/OBS HIGH 75: CPT | Performed by: STUDENT IN AN ORGANIZED HEALTH CARE EDUCATION/TRAINING PROGRAM

## 2024-10-26 PROCEDURE — 97530 THERAPEUTIC ACTIVITIES: CPT

## 2024-10-26 PROCEDURE — 97162 PT EVAL MOD COMPLEX 30 MIN: CPT

## 2024-10-26 PROCEDURE — 85025 COMPLETE CBC W/AUTO DIFF WBC: CPT | Performed by: INTERNAL MEDICINE

## 2024-10-26 PROCEDURE — 87641 MR-STAPH DNA AMP PROBE: CPT | Performed by: INTERNAL MEDICINE

## 2024-10-26 PROCEDURE — 25010000002 VANCOMYCIN 1 G RECONSTITUTED SOLUTION 1 EACH VIAL: Performed by: INTERNAL MEDICINE

## 2024-10-26 PROCEDURE — 94799 UNLISTED PULMONARY SVC/PX: CPT

## 2024-10-26 PROCEDURE — 80053 COMPREHEN METABOLIC PANEL: CPT | Performed by: INTERNAL MEDICINE

## 2024-10-26 PROCEDURE — 25010000002 CEFTRIAXONE PER 250 MG: Performed by: INTERNAL MEDICINE

## 2024-10-26 RX ORDER — DEXAMETHASONE 2 MG/1
2 TABLET ORAL EVERY 12 HOURS SCHEDULED
Status: DISCONTINUED | OUTPATIENT
Start: 2024-10-26 | End: 2024-10-31 | Stop reason: HOSPADM

## 2024-10-26 RX ORDER — SIMETHICONE 80 MG
80 TABLET,CHEWABLE ORAL 4 TIMES DAILY PRN
Status: DISCONTINUED | OUTPATIENT
Start: 2024-10-26 | End: 2024-10-31 | Stop reason: HOSPADM

## 2024-10-26 RX ORDER — IPRATROPIUM BROMIDE AND ALBUTEROL SULFATE 2.5; .5 MG/3ML; MG/3ML
3 SOLUTION RESPIRATORY (INHALATION)
Status: DISCONTINUED | OUTPATIENT
Start: 2024-10-26 | End: 2024-10-31 | Stop reason: HOSPADM

## 2024-10-26 RX ORDER — POTASSIUM CHLORIDE 750 MG/1
40 TABLET, FILM COATED, EXTENDED RELEASE ORAL EVERY 4 HOURS
Status: COMPLETED | OUTPATIENT
Start: 2024-10-26 | End: 2024-10-26

## 2024-10-26 RX ADMIN — ENOXAPARIN SODIUM 40 MG: 100 INJECTION SUBCUTANEOUS at 10:00

## 2024-10-26 RX ADMIN — IPRATROPIUM BROMIDE AND ALBUTEROL SULFATE 3 ML: 2.5; .5 SOLUTION RESPIRATORY (INHALATION) at 01:12

## 2024-10-26 RX ADMIN — LOSARTAN POTASSIUM 50 MG: 50 TABLET, FILM COATED ORAL at 10:00

## 2024-10-26 RX ADMIN — CEFTRIAXONE 2000 MG: 2 INJECTION, POWDER, FOR SOLUTION INTRAMUSCULAR; INTRAVENOUS at 00:54

## 2024-10-26 RX ADMIN — Medication 10 ML: at 10:52

## 2024-10-26 RX ADMIN — Medication 10 ML: at 21:32

## 2024-10-26 RX ADMIN — SODIUM CHLORIDE 1000 MG: 9 INJECTION, SOLUTION INTRAVENOUS at 22:12

## 2024-10-26 RX ADMIN — GADOBENATE DIMEGLUMINE 18 ML: 529 INJECTION, SOLUTION INTRAVENOUS at 14:32

## 2024-10-26 RX ADMIN — BUDESONIDE AND FORMOTEROL FUMARATE DIHYDRATE 2 PUFF: 160; 4.5 AEROSOL RESPIRATORY (INHALATION) at 20:55

## 2024-10-26 RX ADMIN — POTASSIUM CHLORIDE 40 MEQ: 750 TABLET, EXTENDED RELEASE ORAL at 10:00

## 2024-10-26 RX ADMIN — ALBUTEROL SULFATE 2.5 MG: 2.5 SOLUTION RESPIRATORY (INHALATION) at 17:47

## 2024-10-26 RX ADMIN — CEFTRIAXONE 2000 MG: 2 INJECTION, POWDER, FOR SOLUTION INTRAMUSCULAR; INTRAVENOUS at 23:52

## 2024-10-26 RX ADMIN — Medication 5000 UNITS: at 10:00

## 2024-10-26 RX ADMIN — IPRATROPIUM BROMIDE AND ALBUTEROL SULFATE 3 ML: 2.5; .5 SOLUTION RESPIRATORY (INHALATION) at 10:05

## 2024-10-26 RX ADMIN — HYDROCHLOROTHIAZIDE 37.5 MG: 12.5 TABLET ORAL at 10:00

## 2024-10-26 RX ADMIN — POTASSIUM CHLORIDE 40 MEQ: 750 TABLET, EXTENDED RELEASE ORAL at 15:48

## 2024-10-26 RX ADMIN — IPRATROPIUM BROMIDE AND ALBUTEROL SULFATE 3 ML: 2.5; .5 SOLUTION RESPIRATORY (INHALATION) at 20:49

## 2024-10-26 RX ADMIN — DEXAMETHASONE 1 MG: 2 TABLET ORAL at 10:01

## 2024-10-26 RX ADMIN — ASPIRIN 81 MG: 81 TABLET, CHEWABLE ORAL at 10:00

## 2024-10-26 RX ADMIN — DEXAMETHASONE 2 MG: 2 TABLET ORAL at 21:32

## 2024-10-26 NOTE — CONSULTS
"Referring Provider: No ref. provider found  Reason for Consultation:     cellulitis of foot, established with Dr. Durbin     Chief Complaint   Patient presents with    Wound Check         Subjective   History of present illness: Patient is a 74-year-old male with past medical history of COPD on chronic steroids who presents with left foot erythema.  ID consulted for \"cellulitis of foot, established with Dr. Durbin\".    Patient reports recently using Unna boot and developing a blister over the top of his foot.  States he was seeing his foot doctor who initially was doing topical therapy with silver impregnated dressings and after starting this developed more erythema over the top of the foot and was directed by his foot doctor to the ER for IV antibiotics.  He had been given a prescription of outpatient Keflex with no real difference in the erythema.    On presentation patient is afebrile with no leukocytosis.  Lactate is normal and started on ceftriaxone and vancomycin.  Blood cultures have been drawn.  Left foot x-ray shows swelling but no acute fracture or erosion.  MRI has been ordered.    Past Medical History:   Diagnosis Date    Cellulitis     LEFT LEG    COPD (chronic obstructive pulmonary disease)     Hypertension        History reviewed. No pertinent surgical history.    family history includes Hypertension in his father and mother.     reports that he has quit smoking. His smoking use included cigars, electronic cigarette, and pipe. He has never used smokeless tobacco. He reports that he does not drink alcohol and does not use drugs.     No Known Allergies    Medication:  Antibiotics:  Anti-Infectives (From admission, onward)      Ordered     Dose/Rate Route Frequency Start Stop    10/25/24 2010  cefTRIAXone (ROCEPHIN) 2,000 mg in sodium chloride 0.9 % 100 mL MBP        Ordering Provider: Joshua Novoa MD    2,000 mg  200 mL/hr over 30 Minutes Intravenous Every 24 Hours 10/26/24 0000 11/01/24 6319    10/25/24 " 2117  vancomycin (VANCOCIN) 1,000 mg in sodium chloride 0.9 % 250 mL IVPB-VTB        Ordering Provider: Joshua Novoa MD    1,000 mg  250 mL/hr over 60 Minutes Intravenous Every 24 Hours 10/25/24 2215 11/01/24 2214    10/25/24 1848  cefTRIAXone (ROCEPHIN) 1,000 mg in sodium chloride 0.9 % 100 mL MBP        Ordering Provider: Joshua Novoa MD    1,000 mg  200 mL/hr over 30 Minutes Intravenous Once 10/25/24 2030 10/25/24 2219    10/25/24 1848  Pharmacy to dose vancomycin        Ordering Provider: Joshua Novoa MD     Does not apply Continuous PRN 10/25/24 1848 11/01/24 1847    10/25/24 1345  cefTRIAXone (ROCEPHIN) 1,000 mg in sodium chloride 0.9 % 100 mL MBP        Ordering Provider: Mague Salomon PA-C    1,000 mg  200 mL/hr over 30 Minutes Intravenous Once 10/25/24 1401 10/25/24 1505    10/25/24 1345  vancomycin 1750 mg/500 mL 0.9% NS IVPB ([facility])        Ordering Provider: Mague Salomon PA-C    20 mg/kg × 86.2 kg  over 105 Minutes Intravenous Once 10/25/24 1401 10/25/24 1656              Objective     Physical Exam:   Vital Signs   Temp:  [97.5 °F (36.4 °C)-98.2 °F (36.8 °C)] 97.6 °F (36.4 °C)  Heart Rate:  [] 73  Resp:  [16-18] 18  BP: (133-177)/(69-85) 133/73    GENERAL: Awake and alert, in no acute distress.   HEENT: Oropharynx is clear. Hearing is grossly normal.   EYES: PERRL. No conjunctival injection. No lid lag.   LUNGS: Normal work of breathing  SKIN: Erythema over the left dorsal foot with large area of prior ruptured bulla.  No purulence.  PSYCHIATRIC: Appropriate mood, affect, insight, and judgment.     Results Review:   I reviewed the patient's new clinical results.  I reviewed the patient's new imaging results and agree with the interpretation.  I reviewed the patient's other test results and agree with the interpretation    Lab Results   Component Value Date    WBC 7.73 10/26/2024    HGB 12.4 (L) 10/26/2024    HCT 37.8 10/26/2024    MCV 90.6 10/26/2024     10/26/2024       No results  "found for: \"VANCOPEAK\", \"VANCOTROUGH\", \"VANCORANDOM\"    Lab Results   Component Value Date    GLUCOSE 146 (H) 10/26/2024    BUN 18 10/26/2024    CREATININE 0.78 10/26/2024    EGFRIFNONA 66 11/04/2020    EGFRIFAFRI >60 11/17/2021    BCR 23.1 10/26/2024    CO2 31.9 (H) 10/26/2024    CALCIUM 8.6 10/26/2024    PROTENTOTREF 6.2 11/04/2020    ALBUMIN 3.6 10/26/2024    LABIL2 1.4 11/11/2021    AST 17 10/26/2024    ALT 19 10/26/2024         Estimated Creatinine Clearance: 88.4 mL/min (by C-G formula based on SCr of 0.78 mg/dL).      Microbiology:  10/25 blood cultures in process    Radiology:  10/25 left foot x-ray report reviewed with inflammation and edema however no fracture or erosions noted.    Assessment     #Left foot contact dermatitis versus cellulitis  #COPD on chronic steroids    MRI ordered and we will follow-up.  Continue vancomycin goal -600 and ceftriaxone 2 g daily while following up blood cultures.  This may represent more of a topical reaction to his recent change in wound care regimen given his unimpressive vitals and lab results so far.      Thank you for this consult.  We will continue to follow along and tailor antibiotics as the patient's clinical course evolves.    "

## 2024-10-26 NOTE — PROGRESS NOTES
"[facility] Clinical Pharmacy Services: Vancomycin Pharmacokinetic Initial Consult Note    Montana Franco is a 74 y.o. male who is on day 1 of pharmacy to dose vancomycin.    Indication: Skin and Soft Tissue  Consulting Provider: Bright  Planned Duration of Therapy: 7 days  Loading Dose Ordered or Given: 1750 mg on 10/25 at 1511  MRSA PCR performed: ordered; Result: pending  Culture/Source:   10/25 bloodCx - pending x4  Target: -600 mg/L.hr   Pertinent Vanc Dosing History: n/a  Other Antimicrobials: ceftriaxone    Vitals/Labs  Ht: 170.2 cm (67\"); Wt: 86.2 kg (190 lb 0.6 oz)  Temp Readings from Last 1 Encounters:   10/25/24 98 °F (36.7 °C) (Oral)    Estimated Creatinine Clearance: 70.4 mL/min (by C-G formula based on SCr of 0.98 mg/dL).       Results from last 7 days   Lab Units 10/25/24  1322   CREATININE mg/dL 0.98   WBC 10*3/mm3 10.59     Assessment/Plan:    Vancomycin Dose:   1000 mg IV every  24  hours  Predictive AUC level for the dose ordered is 479 mg/L.hr, which is within the target of 400-600 mg/L.hr  No vanc levels ordered at this time - will defer to AM clinical       Pharmacy will follow patient's kidney function and will adjust doses and obtain levels as necessary. Thank you for involving pharmacy in this patient's care. Please contact pharmacy with any questions or concerns.                           Nanda Garcia, [facility]  Clinical Pharmacist    "

## 2024-10-26 NOTE — PLAN OF CARE
Goal Outcome Evaluation:  Plan of Care Reviewed With: patient        Progress: improving  Outcome Evaluation: Patient is here with Cellulitis of Left  foot.  Left foot red and swollen. Wound open to air. Wound consulted. Blister to both left and right foot. VSS. Po pain medication ordered for pain. No complain of pain at this time. Up with assistance. Voiding per urinal. Patient has a bag with some medications inside it and he is refusing for staff to take the bag from him. Education provided on nasal oxygen and blood pressure monitoring. Patient verbalized understanding.

## 2024-10-26 NOTE — THERAPY EVALUATION
Patient Name: Montana Franco  : 1950    MRN: [id number]                              Today's Date: 10/26/2024       Admit Date: 10/25/2024    Visit Dx:     ICD-10-CM ICD-9-CM   1. Cellulitis of left foot  L03.116 682.7   2. Open wound of left foot, initial encounter  S91.302A 892.0   3. Other emphysema  J43.8 492.8     Patient Active Problem List   Diagnosis    Dry eyes    Dry mouth    Low back pain    Pinched nerve    Pulmonary emphysema    SOB (shortness of breath)    Hypertension    Cellulitis of left foot     Past Medical History:   Diagnosis Date    Cellulitis     LEFT LEG    COPD (chronic obstructive pulmonary disease)     Hypertension      History reviewed. No pertinent surgical history.   General Information       Row Name 10/26/24 [facility]          Physical Therapy Time and Intention    Document Type evaluation  -     Mode of Treatment individual therapy;physical therapy  -       Row Name 10/26/24 1157          General Information    Patient Profile Reviewed yes  -     Prior Level of Function independent:;gait;transfer;bed mobility  -     Existing Precautions/Restrictions fall  -     Barriers to Rehab medically complex;previous functional deficit  -       Row Name 10/26/24 1157          Living Environment    People in Home alone  -       Row Name 10/26/24 1157          Cognition    Orientation Status (Cognition) oriented x 4  -       Row Name 10/26/24 1157          Safety Issues/Impairments Affecting Functional Mobility    Impairments Affecting Function (Mobility) balance;endurance/activity tolerance;shortness of breath;strength;pain;range of motion (ROM)  -               User Key  (r) = Recorded By, (t) = Taken By, (c) = Cosigned By      Initials Name Provider Type     Caitlin Durbin PT Physical Therapist                   Mobility       Row Name 10/26/24 1157          Bed Mobility    Bed Mobility supine-sit  -     Supine-Sit [place] (Bed Mobility) standby assist;verbal  cues  -     Assistive Device (Bed Mobility) head of bed elevated;bed rails  -       Row Name 10/26/24 1157          Sit-Stand Transfer    Sit-Stand [place] (Transfers) contact guard;verbal cues  -     Assistive Device (Sit-Stand Transfers) walker, front-wheeled  -     Comment, (Sit-Stand Transfer) x1 from EOB, x1 from commode  -       Row Name 10/26/24 1157          Gait/Stairs (Locomotion)    [place] Level (Gait) verbal cues;contact guard;minimum assist (75% patient effort);1 person assist  -     Assistive Device (Gait) walker, front-wheeled  -     Distance in Feet (Gait) 15  -     Deviations/Abnormal Patterns (Gait) antalgic;niya decreased;gait speed decreased;stride length decreased  -     Bilateral Gait Deviations forward flexed posture  -     Left Sided Gait Deviations weight shift ability decreased;heel strike decreased  -     Comment, (Gait/Stairs) Cued to off-weight LLE with ambulation as able  -               User Key  (r) = Recorded By, (t) = Taken By, (c) = Cosigned By      Initials Name Provider Type     Caitlin Durbin PT Physical Therapist                   Obj/Interventions       [facility] Name 10/26/24 1158          Range of Motion Comprehensive    General Range of Motion bilateral lower extremity ROM WFL  -[facility] Name 10/26/24 1158          Strength Comprehensive (MMT)    General Manual Muscle Testing (MMT) Assessment lower extremity strength deficits identified  -     Comment, General Manual Muscle Testing (MMT) Assessment Generalized weakness, BLE grossly 4/5  -       Row Name 10/26/24 1158          Balance    Balance Assessment sitting static balance;sitting dynamic balance;standing static balance;standing dynamic balance  -     Static Sitting Balance supervision  -     Dynamic Sitting Balance standby assist  -     Position, Sitting Balance unsupported;sitting edge of bed  -     Static Standing Balance contact guard;verbal cues  -     Dynamic  Standing Balance contact guard;verbal cues  -     Position/Device Used, Standing Balance supported;walker, front-wheeled  -     Balance Interventions sitting;standing;sit to stand;supported;static;dynamic  -       Row Name 10/26/24 1152          Sensory Assessment (Somatosensory)    Sensory Assessment (Somatosensory) not tested  -               User Key  (r) = Recorded By, (t) = Taken By, (c) = Cosigned By      Initials Name Provider Type     Caitlin Durbin PT Physical Therapist                   Goals/Plan       Row Name 10/26/24 1201          Bed Mobility Goal 1 (PT)    Activity/Assistive Device (Bed Mobility Goal 1, PT) bed mobility activities, all  -     [place] Level/Cues Needed (Bed Mobility Goal 1, PT) supervision required  -     Time Frame (Bed Mobility Goal 1, PT) 1 week  -       Row Name 10/26/24 1200          Transfer Goal 1 (PT)    Activity/Assistive Device (Transfer Goal 1, PT) transfers, all  -     [place] Level/Cues Needed (Transfer Goal 1, PT) supervision required  -     Time Frame (Transfer Goal 1, PT) 1 week  -[facility] Name 10/26/24 1205          Gait Training Goal 1 (PT)    Activity/Assistive Device (Gait Training Goal 1, PT) gait (walking locomotion)  -     [place] Level (Gait Training Goal 1, PT) supervision required  -     Distance (Gait Training Goal 1, PT) 50ft  -     Time Frame (Gait Training Goal 1, PT) 1 week  -       Row Name 10/26/24 1207          Therapy Assessment/Plan (PT)    Planned Therapy Interventions (PT) balance training;bed mobility training;gait training;patient/family education;home exercise program;ROM (range of motion);stair training;strengthening;stretching;transfer training  -               User Key  (r) = Recorded By, (t) = Taken By, (c) = Cosigned By      Initials Name Provider Type     Caitlin Durbin PT Physical Therapist                   Clinical Impression       Row Name 10/26/24 4649          Pain     Pre/Posttreatment Pain Comment C/o L foot pain with WBing, unable to rate  -       Row Name 10/26/24 5930          Plan of Care Review    Plan of Care Reviewed With patient  -     Outcome Evaluation Pt is a 73 yo M admitted from home with c/o L dorsal foot blister - cellulitis. Pt lives alone and reports independence at BL with a cane, though significant difficulty with mobility over the last couple weeks d/t pain. Pt presents to PT with impaired strength, endurance, and pain limiting overall mobility. Pt transferred to EOB with SBA and PT assisted donning socks long enough for mobility - removed following to leave wound open to air per orders. Pt stood with CGA and ambulated ~8ft to the bathroom with rwx and CGA-min A x1. Pt cued to offweight LLE as much as possible using walker - noted bloody drainage through sock from blister. Pt requiring assist donning/doffing pants, though independent with toilet hygiene. Pt agreeable to sitting UIC - ambulated to chair with min A x1 and again cues to offweight as much as able. Pt left sitting UIC with BLE elevated and socks removed, RN present. Noted pt significantly SOA with mobility today - states he typically only uses O2 when needed but has been having to use 3L O2 consistently at home with any increase in mobility. Satting 95% on 3L O2 following activity today, but reporting significant SOA and requesting O2 be increased - educated on why that wasn't appropriate and cued for PLB. PT will continue to follow, pt is not safe to DC home in current state, likely will need SNF placement pending progress.  -       Row Name 10/26/24 6364          Therapy Assessment/Plan (PT)    Patient/Family Therapy Goals Statement (PT) Return to PLOF  -     Rehab Potential (PT) good  -     Criteria for Skilled Interventions Met (PT) yes  -     Therapy Frequency (PT) 5 times/wk  -       Row Name 10/26/24 9243          Vital Signs    Pre SpO2 (%) 99  -     O2 Delivery Pre  Treatment supplemental O2  -     O2 Delivery Intra Treatment supplemental O2  -     Post SpO2 (%) 96  -     O2 Delivery Post Treatment supplemental O2  -       Row Name 10/26/24 1158          Positioning and Restraints    Pre-Treatment Position in bed  -     Post Treatment Position chair  -BH     In Chair notified nsg;reclined;call light within reach;encouraged to call for assist;exit alarm on;with family/caregiver  -               User Key  (r) = Recorded By, (t) = Taken By, (c) = Cosigned By      Initials Name Provider Type     Caitlin Durbin PT Physical Therapist                   Outcome Measures       Row Name 10/26/24 1204          How much help from another person do you currently need...    Turning from your back to your side while in flat bed without using bedrails? 3  -BH     Moving from lying on back to sitting on the side of a flat bed without bedrails? 3  -BH     Moving to and from a bed to a chair (including a wheelchair)? 3  -BH     Standing up from a chair using your arms (e.g., wheelchair, bedside chair)? 3  -BH     Climbing 3-5 steps with a railing? 2  -BH     To walk in hospital room? 3  -BH     AM-PAC 6 Clicks Score (PT) 17  -     Highest Level of Mobility Goal 5 --> Static standing  -       Row Name 10/26/24 1204          Functional Assessment    Outcome Measure Options AM-PAC 6 Clicks Basic Mobility (PT)  -               User Key  (r) = Recorded By, (t) = Taken By, (c) = Cosigned By      Initials Name Provider Type     Caitlin Durbin PT Physical Therapist                                 Physical Therapy Education       Title: PT OT SLP Therapies (Done)       Topic: Physical Therapy (Done)       Point: Mobility training (Done)       Learning Progress Summary            Patient Acceptance, E,TB,D, VU,NR by  at 10/26/2024 1204                      Point: Home exercise program (Done)       Learning Progress Summary            Patient Acceptance, E,TB,D, VU,NR by  at  10/26/2024 1204                      Point: Body mechanics (Done)       Learning Progress Summary            Patient Acceptance, E,TB,D, VU,NR by  at 10/26/2024 1204                      Point: Precautions (Done)       Learning Progress Summary            Patient Acceptance, E,TB,D, VU,NR by  at 10/26/2024 1204                                      User Key       Initials Effective Dates Name Provider Type Discipline     04/08/22 -  Caitlin Durbin, PT Physical Therapist PT                  PT Recommendation and Plan  Planned Therapy Interventions (PT): balance training, bed mobility training, gait training, patient/family education, home exercise program, ROM (range of motion), stair training, strengthening, stretching, transfer training  Outcome Evaluation: Pt is a 73 yo M admitted from home with c/o L dorsal foot blister - cellulitis. Pt lives alone and reports independence at  with a cane, though significant difficulty with mobility over the last couple weeks d/t pain. Pt presents to PT with impaired strength, endurance, and pain limiting overall mobility. Pt transferred to EOB with SBA and PT assisted donning socks long enough for mobility - removed following to leave wound open to air per orders. Pt stood with CGA and ambulated ~8ft to the bathroom with rwx and CGA-min A x1. Pt cued to offweight LLE as much as possible using walker - noted bloody drainage through sock from blister. Pt requiring assist donning/doffing pants, though independent with toilet hygiene. Pt agreeable to sitting UIC - ambulated to chair with min A x1 and again cues to offweight as much as able. Pt left sitting UIC with BLE elevated and socks removed, RN present. Noted pt significantly SOA with mobility today - states he typically only uses O2 when needed but has been having to use 3L O2 consistently at home with any increase in mobility. Satting 95% on 3L O2 following activity today, but reporting significant SOA and requesting  O2 be increased - educated on why that wasn't appropriate and cued for PLB. PT will continue to follow, pt is not safe to DC home in current state, likely will need SNF placement pending progress.     Time Calculation:   PT Evaluation Complexity  History, PT Evaluation Complexity: 1-2 personal factors and/or comorbidities  Examination of Body Systems (PT Eval Complexity): total of 3 or more elements  Clinical Presentation (PT Evaluation Complexity): evolving  Clinical Decision Making (PT Evaluation Complexity): moderate complexity  Overall Complexity (PT Evaluation Complexity): moderate complexity     PT Charges       Row Name 10/26/24 1205             Time Calculation    Start Time 1121  -      Stop Time 1146  -      Time Calculation (min) 25 min  -      PT Received On 10/26/24  -      PT - Next Appointment 10/28/24  -      PT Goal Re-Cert Due Date 11/02/24  -         Time Calculation- PT    Total Timed Code Minutes- PT 23 minute(s)  -         Timed Charges    77554 - Gait Training Minutes  8  -BH      58080 - PT Therapeutic Activity Minutes 15  -BH         Total Minutes    Timed Charges Total Minutes 23  -       Total Minutes 23  -                User Key  (r) = Recorded By, (t) = Taken By, (c) = Cosigned By      Initials Name Provider Type     Caitlin Durbin, PT Physical Therapist                  Therapy Charges for Today       Code Description Service Date Service Provider Modifiers Qty    68460413447 HC GAIT TRAINING EA 15 MIN 10/26/2024 Caitlin Durbin, PT GP 1    68688771169 HC PT THERAPEUTIC ACT EA 15 MIN 10/26/2024 Caitlin Durbin, PT GP 1    [id number] HC PT EVAL MOD COMPLEXITY 3 10/26/2024 Caitlin Durbin, PT GP 1            PT G-Codes  Outcome Measure Options: AM-PAC 6 Clicks Basic Mobility (PT)  AM-PAC 6 Clicks Score (PT): 17  PT Discharge Summary  Anticipated Discharge Disposition (PT): skilled nursing facility    Caitlin Durbin PT  10/26/2024

## 2024-10-26 NOTE — PROGRESS NOTES
Name: Montana Franco ADMIT: 10/25/2024   : 1950  PCP: Sunshine Ashley APRN    MRN: [id number] LOS: 0 days   AGE/SEX: 74 y.o. male  ROOM: [facility]/     Subjective   Subjective     Sitting up in chair.  Still with pain to left foot particularly with weight bearing.  Denies fever, chills, rigors.  He reports recent covd infection 10/9.  Reports a bit more shoa since that time.  Does a self-titration per his PCP of dexamethasone oral at home 1- 4 mg daily depending on how he is breathing. He reports recently weaning back down to 1 mg daily from 2 mg BID.  He states he can't take prednisone due to increased lower extremity swelling with it.  IS on symbicort and duonebs at home scheduled at home.  Has not been getting symbicort here and duonebs sporatically he reports.  Has seen pulmonary in past but didn't continue with f/u's.  Reports on 02 1-4 liters.  Increases to 3-4 with exertion.  Reports increase abd gas.  Says he uses gas x at home. Denies abd pain, n/v, chest pain.        Objective   Objective   Vital Signs  Temp:  [97.5 °F (36.4 °C)-98 °F (36.7 °C)] 97.5 °F (36.4 °C)  Heart Rate:  [73-87] 82  Resp:  [16-18] 18  BP: (133-177)/(68-85) 144/68  SpO2:  [94 %-100 %] 100 %  on  Flow (L/min) (Oxygen Therapy):  [2] 2;   Device (Oxygen Therapy): nasal cannula  Body mass index is 29.76 kg/m².    Physical Exam  Vitals and nursing note reviewed.   Constitutional:       Appearance: He is obese. He is ill-appearing (chronically ill appearing).   Eyes:      General: No scleral icterus.     Conjunctiva/sclera: Conjunctivae normal.   Cardiovascular:      Rate and Rhythm: Normal rate and regular rhythm.      Pulses: Normal pulses.      Heart sounds: Normal heart sounds.   Pulmonary:      Breath sounds: Wheezing (faint expiratory wheezing.) present.      Comments: Conversation dyspnea.    Abdominal:      General: Abdomen is flat. There is no distension.      Palpations: Abdomen is soft.      Tenderness: There is no abdominal  "tenderness.   Musculoskeletal:      Right lower leg: Edema present.      Left lower leg: Edema present.   Skin:     General: Skin is warm and dry.      Comments: Venous stasis changes to lower extremities. Left dorsal surface of foot with large granulated area of tissue with scan serosanguinous oozing on lateral aspect of wound.  No purulent drainage or odor noted.    Neurological:      General: No focal deficit present.      Mental Status: He is alert and oriented to person, place, and time. Mental status is at baseline.   Psychiatric:         Mood and Affect: Mood normal.         Behavior: Behavior normal.         Thought Content: Thought content normal.         Judgment: Judgment normal.               Results Review  I reviewed the patient's new clinical results.  Results from last 7 days   Lab Units 10/26/24  0447 10/25/24  1322   WBC 10*3/mm3 7.73 10.59   HEMOGLOBIN g/dL 12.4* 13.1   PLATELETS 10*3/mm3 161 157     Results from last 7 days   Lab Units 10/26/24  0447 10/25/24  1322   SODIUM mmol/L 141 138   POTASSIUM mmol/L 3.2* 3.5   CHLORIDE mmol/L 101 99   CO2 mmol/L 31.9* 31.7*   BUN mg/dL 18 29*   CREATININE mg/dL 0.78 0.98   GLUCOSE mg/dL 146* 141*     Lab Results   Component Value Date    ANIONGAP 8.1 10/26/2024     Estimated Creatinine Clearance: 88.4 mL/min (by C-G formula based on SCr of 0.78 mg/dL).   Lab Results   Component Value Date    EGFR 94.2 10/26/2024     Results from last 7 days   Lab Units 10/26/24  0447 10/25/24  1322   ALBUMIN g/dL 3.6 3.8   BILIRUBIN mg/dL <0.2 0.4   ALK PHOS U/L 62 66   AST (SGOT) U/L 17 18   ALT (SGPT) U/L 19 20     Results from last 7 days   Lab Units 10/26/24  0447 10/25/24  1322   CALCIUM mg/dL 8.6 9.0   ALBUMIN g/dL 3.6 3.8   MAGNESIUM mg/dL 2.3  --      Results from last 7 days   Lab Units 10/25/24  1322   LACTATE mmol/L 0.9     No results found for: \"HGBA1C\", \"POCGLU\"    XR Chest 1 View    Result Date: 10/25/2024  Small bibasilar likely atelectasis or scarring. " Pulmonary hyperinflation. Follow-up as clinical indications persist.  This report was finalized on 10/25/2024 4:01 PM by Dr. Hai Martell M.D on Workstation: [employer]      XR Foot 3+ View Left    Result Date: 10/25/2024   As described.    This report was finalized on 10/25/2024 2:06 PM by Dr. Hai Martell M.D on Workstation: [employer]         Current Facility-Administered Medications:     acetaminophen (TYLENOL) tablet 650 mg, 650 mg, Oral, Q4H PRN **OR** acetaminophen (TYLENOL) 160 MG/5ML oral solution 650 mg, 650 mg, Oral, Q4H PRN **OR** acetaminophen (TYLENOL) suppository 650 mg, 650 mg, Rectal, Q4H PRN, Joshua Novoa MD    albuterol (PROVENTIL) nebulizer solution 0.083% 2.5 mg/3mL, 2.5 mg, Nebulization, Q6H PRN, Joshua Novoa MD    aspirin chewable tablet 81 mg, 81 mg, Oral, Daily, Joshua Novoa MD, 81 mg at 10/26/24 1000    budesonide-formoterol (SYMBICORT) 160-4.5 MCG/ACT inhaler 2 puff, 2 puff, Inhalation, BID - RT, Joshua Novoa MD    Calcium Replacement - Follow Nurse / BPA Driven Protocol, , Does not apply, PRN, Gayla Scott, APRN    cefTRIAXone (ROCEPHIN) 2,000 mg in sodium chloride 0.9 % 100 mL MBP, 2,000 mg, Intravenous, Q24H, Joshua Novoa MD, Last Rate: 200 mL/hr at 10/26/24 0054, 2,000 mg at 10/26/24 0054    cholecalciferol (VITAMIN D3) tablet 5,000 Units, 5,000 Units, Oral, Daily, Joshua Novoa MD, 5,000 Units at 10/26/24 1000    dexAMETHasone (DECADRON) tablet 1 mg, 1 mg, Oral, Daily, Joshua Novoa MD, 1 mg at 10/26/24 1001    Enoxaparin Sodium (LOVENOX) syringe 40 mg, 40 mg, Subcutaneous, Daily, Joshua Novoa MD, 40 mg at 10/26/24 1000    hydroCHLOROthiazide tablet 37.5 mg, 37.5 mg, Oral, Daily, Joshua Novoa MD, 37.5 mg at 10/26/24 1000    HYDROcodone-acetaminophen (NORCO) 5-325 MG per tablet 1 tablet, 1 tablet, Oral, Q6H PRN, Joshua Novoa MD    ipratropium-albuterol (DUO-NEB) nebulizer solution 3 mL, 3 mL, Nebulization, Q6H PRN, Joshua Novoa MD, 3 mL at 10/26/24 1005    losartan  (COZAAR) tablet 50 mg, 50 mg, Oral, Daily, Joshua Novoa MD, 50 mg at 10/26/24 1000    Magnesium Standard Dose Replacement - Follow Nurse / BPA Driven Protocol, , Does not apply, PRNTyler Vonnie L, APRN    ondansetron (ZOFRAN) injection 4 mg, 4 mg, Intravenous, Q6H PRN, Joshua Novoa MD    Pharmacy to dose vancomycin, , Does not apply, Continuous PRN, Joshua Novoa MD    Phosphorus Replacement - Follow Nurse / BPA Driven Protocol, , Does not apply, PRN, Gayla Scott APRN    potassium chloride (K-DUR,KLOR-CON) ER tablet 40 mEq, 40 mEq, Oral, Q4H, Diane Durbin MD, 40 mEq at 10/26/24 1000    Potassium Replacement - Follow Nurse / BPA Driven Protocol, , Does not apply, PRNTyler Vonnie L, APRN    sodium chloride 0.9 % flush 10 mL, 10 mL, Intravenous, Q12H, Joshua Novoa MD, 10 mL at 10/26/24 1052    sodium chloride 0.9 % flush 10 mL, 10 mL, Intravenous, PRN, Joshua Novoa MD    vancomycin (VANCOCIN) 1,000 mg in sodium chloride 0.9 % 250 mL IVPB-VTB, 1,000 mg, Intravenous, Q24H, Joshua Novoa MD, Last Rate: 250 mL/hr at 10/25/24 2149, 1,000 mg at 10/25/24 2149      Pharmacy to dose vancomycin,     Diet  Diet: Regular/House; Fluid Consistency: Thin (IDDSI 0)       Assessment/Plan     Active Hospital Problems    Diagnosis  POA    **Cellulitis of left foot [L03.116]  Yes    Hypertension [I10]  Yes    Pulmonary emphysema [J43.9]  Yes    Low back pain [M54.50]  Yes      Resolved Hospital Problems   No resolved problems to display.     74 y.o. male with past medical history for COPD with chronic hypoxia on chronic O2 supplementation, chronic steroid use with dexamethasone, history of hypertension, and chronic venous stasis ulcers of lower extremities who presented to [facility] with complaints of redness, swelling and pain to an open wound to the dorsum of his left foot.  He is followed by wound care as an outpatient and recently had developed a large fluid-filled blister to the dorsum of his  foot which he reported was the size of a baseball.  Earlier this week the blister drained and he was left with an open wound without overlying tissue present.  Wound care had placed him on Keflex and he was evaluated in the outpatient setting by wound care who advised him to seek further treatment with IV antibiotics at [facility].  Noted to be COVID-positive 10/9/2024 denies any increased shortness of breath or chest pain on admission.    Cellulitis of left foot  -Recent treatment and outpatient wound care with Keflex  -MRI of left foot pending  -Prior history of Enterococcus in October 2023 and he was treated with Zyvox.  He has been followed in the past by Dr. Durbin with infectious diseases.  -He has chronic venous stasis dermatitis and has been followed by vascular and podiatry service as an outpatient.  -No leukocytosis on admission or systemic signs of sepsis, lactate normal  -He is afebrile  -X-ray of the left foot without acute pathology  -MRSA screen was obtained pending.   -Ceftriaxone and vanc currently ordered.  Discussed with Dr. Collazo.  Will ask ID to weigh in given prior history of enterococcus and establishment with Dr. Durbin.      Hypertension  -losartan 50 mg daily and HCTZ as outpatient this has been continued inpatient  -Some highs noted yesterday continue to monitor.  Trending today 130s 140s systolic over 60s 70s diastolic, heart rates in the 70s to 80s.  BP elevation may be related to pain.    -Monitor blood pressure and avoid hypotension    COPD with chronic hypoxic respiratory failure on home 02.   -On 1 L-4 L nasal cannula as an outpatient this has been continued and currently on 3 liters.   -chronic Steroid use with dexamethasone as an outpatient 1 mg daily with increase to 2 mg daily for increased shortness of breath per patient.  Will increase to 2 mg BID for 3 days and reassess.   His recent covid infection may have lead to flare.    -reordered nebulizers but changed to  scheduled and reordered Symbicort  - chest xray 10/25 Small bibasilar likely atelectasis or scarring. Pulmonary  hyperinflation.   - low dose chest CT 5/2024 showed no suspicious pulmonary opacities or nodules.  Has partially opacified bronchi at the lower lobes with mucous /secretion along the right mainstem bronchus.  Repeat CT was recommended in 12 months.    - add flutter valve and IS.    - monitor.     Hypokalemia  - replacement ordered and underway  - mag normal at 2.3       DVT prophylaxis  SCDs    Discharge  TBD  Expected discharge date/ time has not been documented.  To Be determined    Discussed with patient and nursing staff.  Pt lives alone and may need short stay in rehab post discharge given his mobility issues.     DENISSE Lake  [facility]

## 2024-10-26 NOTE — PLAN OF CARE
Problem: Adult Inpatient Plan of Care  Goal: Plan of Care Review  Outcome: Progressing  Goal: Patient-Specific Goal (Individualized)  Outcome: Progressing  Goal: Absence of Hospital-Acquired Illness or Injury  Outcome: Progressing  Intervention: Identify and Manage Fall Risk  Recent Flowsheet Documentation  Taken 10/26/2024 1205 by Jeffrey Shaw RN  Safety Promotion/Fall Prevention: safety round/check completed  Taken 10/26/2024 1000 by Jeffrey Shaw RN  Safety Promotion/Fall Prevention: safety round/check completed  Taken 10/26/2024 0748 by Jeffrey Shaw RN  Safety Promotion/Fall Prevention: safety round/check completed  Intervention: Prevent and Manage VTE (Venous Thromboembolism) Risk  Recent Flowsheet Documentation  Taken 10/26/2024 1000 by Jeffrey Shaw RN  VTE Prevention/Management:   SCDs (sequential compression devices) off   other (see comments)  Goal: Optimal Comfort and Wellbeing  Outcome: Progressing  Goal: Readiness for Transition of Care  Outcome: Progressing     Problem: Skin Injury Risk Increased  Goal: Skin Health and Integrity  Outcome: Progressing  Intervention: Optimize Skin Protection  Recent Flowsheet Documentation  Taken 10/26/2024 1000 by Jeffrey Shaw RN  Activity Management: activity encouraged     Problem: Comorbidity Management  Goal: Maintenance of Asthma Control  Outcome: Progressing  Goal: Maintenance of Behavioral Health Symptom Control  Outcome: Progressing  Goal: Maintenance of COPD Symptom Control  Outcome: Progressing  Goal: Blood Glucose Level Within Target Range  Outcome: Progressing  Goal: Maintenance of Heart Failure Symptom Control  Outcome: Progressing  Goal: Blood Pressure in Desired Range  Outcome: Progressing  Goal: Maintenance of Osteoarthritis Symptom Control  Outcome: Progressing  Intervention: Maintain Osteoarthritis Symptom Control  Recent Flowsheet Documentation  Taken 10/26/2024 1000 by Jeffrey Shaw RN  Activity Management: activity  encouraged  Goal: Bariatric Home Regimen Maintained  Outcome: Progressing  Goal: Maintenance of Seizure Control  Outcome: Progressing     Problem: Fall Injury Risk  Goal: Absence of Fall and Fall-Related Injury  Outcome: Progressing  Intervention: Promote Injury-Free Environment  Recent Flowsheet Documentation  Taken 10/26/2024 1205 by Jeffrey Shaw, RN  Safety Promotion/Fall Prevention: safety round/check completed  Taken 10/26/2024 1000 by Jeffrey Shaw, RN  Safety Promotion/Fall Prevention: safety round/check completed  Taken 10/26/2024 0748 by Jeffrey Shaw, RN  Safety Promotion/Fall Prevention: safety round/check completed   Goal Outcome Evaluation:

## 2024-10-26 NOTE — PROGRESS NOTES
"[facility] Clinical Pharmacy Services: Vancomycin Monitoring Note    Montana Franco is a 74 y.o. male who is on day 2/7 of pharmacy to dose vancomycin for Skin and Soft Tissue per Dr. Novoa's request.    Previous Vancomycin Dose:    1000 mg IV every  24  hours  Updated Cultures and Sensitivities:   -10/25 blood (4 sets) pending      Vitals/Labs  Ht: 170.2 cm (67\"); Wt: 86.2 kg (190 lb 0.6 oz)   Temp Readings from Last 1 Encounters:   10/26/24 97.6 °F (36.4 °C) (Oral)     Estimated Creatinine Clearance: 88.4 mL/min (by C-G formula based on SCr of 0.78 mg/dL).       Results from last 7 days   Lab Units 10/26/24  0447 10/25/24  1322   CREATININE mg/dL 0.78 0.98   WBC 10*3/mm3 7.73 10.59     Assessment/Plan    Current Vancomycin Dose: 1000 mg IV every  24  hours; provides a predicted  mg/L.hr ; scr decreased today leading to current predicted value. Has currently received 1750 mg and 1000 mg doses.  Next Level Date and Time: Vanc Trough on 10/26 at 2130 prior to 3rd overall dose to reassess with decreased scr.   We will continue to monitor patient changes and renal function     Thank you for involving pharmacy in this patient's care. Please contact pharmacy with any questions or concerns.       Peter Mondragon, [facility]  Clinical Pharmacist          "

## 2024-10-26 NOTE — PLAN OF CARE
Goal Outcome Evaluation:  Plan of Care Reviewed With: patient           Outcome Evaluation: Pt is a 73 yo M admitted from home with c/o L dorsal foot blister - cellulitis. Pt lives alone and reports independence at BL with a cane, though significant difficulty with mobility over the last couple weeks d/t pain. Pt presents to PT with impaired strength, endurance, and pain limiting overall mobility. Pt transferred to EOB with SBA and PT assisted donning socks long enough for mobility - removed following to leave wound open to air per orders. Pt stood with CGA and ambulated ~8ft to the bathroom with rwx and CGA-min A x1. Pt cued to offweight LLE as much as possible using walker - noted bloody drainage through sock from blister. Pt requiring assist donning/doffing pants, though independent with toilet hygiene. Pt agreeable to sitting UIC - ambulated to chair with min A x1 and again cues to offweight as much as able. Pt left sitting UIC with BLE elevated and socks removed, RN present. Noted pt significantly SOA with mobility today - states he typically only uses O2 when needed but has been having to use 3L O2 consistently at home with any increase in mobility. Satting 95% on 3L O2 following activity today, but reporting significant SOA and requesting O2 be increased - educated on why that wasn't appropriate and cued for PLB. PT will continue to follow, pt is not safe to DC home in current state, likely will need SNF placement pending progress.    Anticipated Discharge Disposition (PT): skilled nursing facility

## 2024-10-27 LAB
ANION GAP SERPL CALCULATED.3IONS-SCNC: 8.8 MMOL/L (ref 5–15)
B PARAPERT DNA SPEC QL NAA+PROBE: NOT DETECTED
B PERT DNA SPEC QL NAA+PROBE: NOT DETECTED
BUN SERPL-MCNC: 16 MG/DL (ref 8–23)
BUN/CREAT SERPL: 22.2 (ref 7–25)
C PNEUM DNA NPH QL NAA+NON-PROBE: NOT DETECTED
CALCIUM SPEC-SCNC: 9.1 MG/DL (ref 8.6–10.5)
CHLORIDE SERPL-SCNC: 99 MMOL/L (ref 98–107)
CO2 SERPL-SCNC: 27.2 MMOL/L (ref 22–29)
CREAT SERPL-MCNC: 0.72 MG/DL (ref 0.76–1.27)
EGFRCR SERPLBLD CKD-EPI 2021: 96.5 ML/MIN/1.73
FLUAV SUBTYP SPEC NAA+PROBE: NOT DETECTED
FLUBV RNA ISLT QL NAA+PROBE: NOT DETECTED
GLUCOSE SERPL-MCNC: 171 MG/DL (ref 65–99)
HADV DNA SPEC NAA+PROBE: NOT DETECTED
HCOV 229E RNA SPEC QL NAA+PROBE: NOT DETECTED
HCOV HKU1 RNA SPEC QL NAA+PROBE: NOT DETECTED
HCOV NL63 RNA SPEC QL NAA+PROBE: NOT DETECTED
HCOV OC43 RNA SPEC QL NAA+PROBE: NOT DETECTED
HMPV RNA NPH QL NAA+NON-PROBE: NOT DETECTED
HPIV1 RNA ISLT QL NAA+PROBE: NOT DETECTED
HPIV2 RNA SPEC QL NAA+PROBE: NOT DETECTED
HPIV3 RNA NPH QL NAA+PROBE: NOT DETECTED
HPIV4 P GENE NPH QL NAA+PROBE: NOT DETECTED
M PNEUMO IGG SER IA-ACNC: NOT DETECTED
POTASSIUM SERPL-SCNC: 4.3 MMOL/L (ref 3.5–5.2)
RHINOVIRUS RNA SPEC NAA+PROBE: NOT DETECTED
RSV RNA NPH QL NAA+NON-PROBE: NOT DETECTED
SARS-COV-2 RNA NPH QL NAA+NON-PROBE: NOT DETECTED
SODIUM SERPL-SCNC: 135 MMOL/L (ref 136–145)

## 2024-10-27 PROCEDURE — 94799 UNLISTED PULMONARY SVC/PX: CPT

## 2024-10-27 PROCEDURE — 99232 SBSQ HOSP IP/OBS MODERATE 35: CPT | Performed by: STUDENT IN AN ORGANIZED HEALTH CARE EDUCATION/TRAINING PROGRAM

## 2024-10-27 PROCEDURE — 0202U NFCT DS 22 TRGT SARS-COV-2: CPT | Performed by: INTERNAL MEDICINE

## 2024-10-27 PROCEDURE — 94761 N-INVAS EAR/PLS OXIMETRY MLT: CPT

## 2024-10-27 PROCEDURE — 87070 CULTURE OTHR SPECIMN AEROBIC: CPT | Performed by: INTERNAL MEDICINE

## 2024-10-27 PROCEDURE — 87186 SC STD MICRODIL/AGAR DIL: CPT | Performed by: INTERNAL MEDICINE

## 2024-10-27 PROCEDURE — 25010000002 VANCOMYCIN 1 G RECONSTITUTED SOLUTION 1 EACH VIAL: Performed by: INTERNAL MEDICINE

## 2024-10-27 PROCEDURE — 25010000002 CEFTRIAXONE PER 250 MG: Performed by: INTERNAL MEDICINE

## 2024-10-27 PROCEDURE — 25010000002 ENOXAPARIN PER 10 MG: Performed by: INTERNAL MEDICINE

## 2024-10-27 PROCEDURE — 94760 N-INVAS EAR/PLS OXIMETRY 1: CPT

## 2024-10-27 PROCEDURE — 80048 BASIC METABOLIC PNL TOTAL CA: CPT | Performed by: NURSE PRACTITIONER

## 2024-10-27 PROCEDURE — 87205 SMEAR GRAM STAIN: CPT | Performed by: INTERNAL MEDICINE

## 2024-10-27 PROCEDURE — 25810000003 SODIUM CHLORIDE 0.9 % SOLUTION 250 ML FLEX CONT: Performed by: INTERNAL MEDICINE

## 2024-10-27 PROCEDURE — 87077 CULTURE AEROBIC IDENTIFY: CPT | Performed by: INTERNAL MEDICINE

## 2024-10-27 PROCEDURE — 94664 DEMO&/EVAL PT USE INHALER: CPT

## 2024-10-27 RX ORDER — SODIUM CHLORIDE FOR INHALATION 7 %
4 VIAL, NEBULIZER (ML) INHALATION
Status: DISCONTINUED | OUTPATIENT
Start: 2024-10-27 | End: 2024-10-28

## 2024-10-27 RX ORDER — GUAIFENESIN 600 MG/1
600 TABLET, EXTENDED RELEASE ORAL EVERY 12 HOURS SCHEDULED
Status: DISCONTINUED | OUTPATIENT
Start: 2024-10-27 | End: 2024-10-28

## 2024-10-27 RX ADMIN — BUDESONIDE AND FORMOTEROL FUMARATE DIHYDRATE 2 PUFF: 160; 4.5 AEROSOL RESPIRATORY (INHALATION) at 07:09

## 2024-10-27 RX ADMIN — SIMETHICONE 80 MG: 80 TABLET, CHEWABLE ORAL at 10:19

## 2024-10-27 RX ADMIN — ASPIRIN 81 MG: 81 TABLET, CHEWABLE ORAL at 10:10

## 2024-10-27 RX ADMIN — GUAIFENESIN 600 MG: 600 TABLET, EXTENDED RELEASE ORAL at 21:24

## 2024-10-27 RX ADMIN — Medication 10 ML: at 21:24

## 2024-10-27 RX ADMIN — Medication 10 ML: at 10:10

## 2024-10-27 RX ADMIN — Medication 5000 UNITS: at 10:09

## 2024-10-27 RX ADMIN — LOSARTAN POTASSIUM 50 MG: 50 TABLET, FILM COATED ORAL at 10:10

## 2024-10-27 RX ADMIN — Medication 4 ML: at 16:51

## 2024-10-27 RX ADMIN — DEXAMETHASONE 2 MG: 2 TABLET ORAL at 21:24

## 2024-10-27 RX ADMIN — IPRATROPIUM BROMIDE AND ALBUTEROL SULFATE 3 ML: 2.5; .5 SOLUTION RESPIRATORY (INHALATION) at 19:11

## 2024-10-27 RX ADMIN — IPRATROPIUM BROMIDE AND ALBUTEROL SULFATE 3 ML: 2.5; .5 SOLUTION RESPIRATORY (INHALATION) at 10:53

## 2024-10-27 RX ADMIN — IPRATROPIUM BROMIDE AND ALBUTEROL SULFATE 3 ML: 2.5; .5 SOLUTION RESPIRATORY (INHALATION) at 07:09

## 2024-10-27 RX ADMIN — VANCOMYCIN HYDROCHLORIDE 1000 MG: 1 INJECTION, POWDER, LYOPHILIZED, FOR SOLUTION INTRAVENOUS at 21:24

## 2024-10-27 RX ADMIN — IPRATROPIUM BROMIDE AND ALBUTEROL SULFATE 3 ML: 2.5; .5 SOLUTION RESPIRATORY (INHALATION) at 16:51

## 2024-10-27 RX ADMIN — CEFTRIAXONE 2000 MG: 2 INJECTION, POWDER, FOR SOLUTION INTRAMUSCULAR; INTRAVENOUS at 23:55

## 2024-10-27 RX ADMIN — SIMETHICONE 80 MG: 80 TABLET, CHEWABLE ORAL at 17:26

## 2024-10-27 RX ADMIN — GUAIFENESIN 600 MG: 600 TABLET, EXTENDED RELEASE ORAL at 12:05

## 2024-10-27 RX ADMIN — DEXAMETHASONE 2 MG: 2 TABLET ORAL at 10:19

## 2024-10-27 RX ADMIN — ACETAMINOPHEN 325MG 650 MG: 325 TABLET ORAL at 01:41

## 2024-10-27 RX ADMIN — HYDROCHLOROTHIAZIDE 37.5 MG: 12.5 TABLET ORAL at 10:09

## 2024-10-27 RX ADMIN — BUDESONIDE AND FORMOTEROL FUMARATE DIHYDRATE 2 PUFF: 160; 4.5 AEROSOL RESPIRATORY (INHALATION) at 19:11

## 2024-10-27 RX ADMIN — VANCOMYCIN HYDROCHLORIDE 1000 MG: 1 INJECTION, POWDER, LYOPHILIZED, FOR SOLUTION INTRAVENOUS at 10:09

## 2024-10-27 RX ADMIN — ENOXAPARIN SODIUM 40 MG: 100 INJECTION SUBCUTANEOUS at 10:09

## 2024-10-27 NOTE — CONSULTS
[facility]    Reason for consult: COPD    HPI:  Mr. Franco is a 75yo male with Severe COPD (fev1 28%, with air trapping and decreased dlco). He has eosinophillic phenotype, likely asthma overlap syndrome. He has fairly frequent acute exacebations. I saw him back in 2018 and he was lost to follow up and started back as a new patient with DR. Root last seen in June of this year. I reviewed his office note from that visit. Mr. Franco has ongoing shortness of breath and cough, worse on exertion. He gets short of breath with minimal exertion. This is not improving, gradually worsening. He is on symbicort, has not reported symptomatic improvement with addition of multiple lama's.  He is on dexamethsone chroniclly it appears, I am unclear as to the indication for that.     Says he still has ongoing cough and congestion. Gets much better with steroids.  Has frequent need for steroids. Uses unruly 4 or more times a day.      Past Medical History:   Diagnosis Date    Cellulitis     LEFT LEG    COPD (chronic obstructive pulmonary disease)     Hypertension      Social History     Socioeconomic History    Marital status:    Tobacco Use    Smoking status: Former     Types: Cigars, Electronic Cigarette, Pipe    Smokeless tobacco: Never    Tobacco comments:     smokes the ecig as well   Vaping Use    Vaping status: Never Used   Substance and Sexual Activity    Alcohol use: No    Drug use: No    Sexual activity: Defer     Family History   Problem Relation Age of Onset    Hypertension Mother     Hypertension Father      MEDS: reviewed as per chart  ROS: pain and redness in left foot, otherwise 12 point negative except as in HPI    Vital Sign Min/Max for last 24 hours  Temp  Min: 97.6 °F (36.4 °C)  Max: 98.3 °F (36.8 °C)   BP  Min: 121/72  Max: 157/79   Pulse  Min: 73  Max: 90   Resp  Min: 16  Max: 20   SpO2  Min: 94 %  Max: 100 %   Flow (L/min) (Oxygen Therapy)  Min: 2  Max: 2   No data recorded     GEN:   appears  ill,  A&O x3  HEENT: PERRL, EOMI, no icterus, mmm, no JVD, trachea midline, neck supple  CHEST: decreased ae  bilat, no wheezes, no crackles, no use of accessory muscles  CV: RRR, no m/g/r  ABD: soft, nt, nd +bs, no hepatosplenomegaly  EXT: no c/c/ +  edema foot bandadge with some erythema  SKIN: no rashes, no xanthomas, nl turgor, warm, dry  LYMPH: no palpable cervical or supraclavicular lymphadenopathy  NEURO: CN 2-12 intact and symmetric bilaterally  PSYCH: nl affect, nl orientation, nl judgment, nl mood  MSK: no kyphoscoliosis, 5/5 strength ue and le bilaterally    Labs: 10/27: reviewed:  Glucose 172  Bun 16  Cr 0.72  Na 135  Bicarb 27  10/26:  Wbc 7.78 (eos 3%; 0.23)  Hgb 12.45  Plts 161  10/25: CXR: reviewed chronic changes nothing acute  3/2018: echo: pretty normal; stress low risk    A/P:  Severe COPD -- He is going to have shortness of breath on exertion given severity of his disease.  Already on laba/lics no percieved benefit from lama.  Might try ohtyvare as an outpatient.  Might benefit from dupixent given eosinohillic phenotype to help reduce ae.  Recommend outpatient pulm rehab as recommended by Dr. Root.  Check echo while here but symptoms seems as expected for severity of his copd.  Hypoxemia -- oxygen as needed to keep sats >88%  3.   Eosinophillia -- check anca panel  4.   Chronic bronchitis. -- needs flutter valve, nebulized hypertonic saline, pulmonary toilet,     He says he doesn't have prescription drug coverage which  may limit some of our options but he does have part B.  May have to explore getting ohtyvare.  Chronic macrolide therapy ought to be affordable as well.  Dupixent might be more complicated will have to explore that through office on follow up.

## 2024-10-27 NOTE — PROGRESS NOTES
LOS: 1 day     Chief Complaint: Cellulitis    Interval History: MRI performed and report pending.  Remains afebrile.  Tolerating antibiotics.    Vital Signs  Temp:  [97.5 °F (36.4 °C)-98.3 °F (36.8 °C)] 98 °F (36.7 °C)  Heart Rate:  [73-90] 77  Resp:  [16-20] 20  BP: (121-157)/(68-79) 121/72    Physical Exam:  General: In no acute distress  HEENT: Oropharynx clear, moist mucous membranes  Respiratory: Normal work of breathing  Skin: Erythema of the left foot however site now dressed with no evidence of purulence.  Extremities: Edema of the left foot.  Access: Peripheral IV    Antibiotics:  Anti-Infectives (From admission, onward)      Ordered     Dose/Rate Route Frequency Start Stop    10/26/24 2217  vancomycin (VANCOCIN) 1,000 mg in sodium chloride 0.9 % 250 mL IVPB-VTB        Ordering Provider: Joshua Novoa MD    1,000 mg  250 mL/hr over 60 Minutes Intravenous Every 12 Hours 10/27/24 1000 11/01/24 0959    10/25/24 2010  cefTRIAXone (ROCEPHIN) 2,000 mg in sodium chloride 0.9 % 100 mL MBP        Ordering Provider: Joshua Novoa MD    2,000 mg  200 mL/hr over 30 Minutes Intravenous Every 24 Hours 10/26/24 0000 11/01/24 2359    10/25/24 1848  cefTRIAXone (ROCEPHIN) 1,000 mg in sodium chloride 0.9 % 100 mL MBP        Ordering Provider: Joshua Novoa MD    1,000 mg  200 mL/hr over 30 Minutes Intravenous Once 10/25/24 2030 10/25/24 2219    10/25/24 1848  Pharmacy to dose vancomycin        Ordering Provider: Joshua Novoa MD     Does not apply Continuous PRN 10/25/24 1848 11/01/24 1847    10/25/24 1345  cefTRIAXone (ROCEPHIN) 1,000 mg in sodium chloride 0.9 % 100 mL MBP        Ordering Provider: Mague Salomon PA-C    1,000 mg  200 mL/hr over 30 Minutes Intravenous Once 10/25/24 1401 10/25/24 1505    10/25/24 1345  vancomycin 1750 mg/500 mL 0.9% NS IVPB ([facility])        Ordering Provider: Mague Salomon PA-C    20 mg/kg × 86.2 kg  over 105 Minutes Intravenous Once 10/25/24 1401 10/25/24 4305             Results Review:      I reviewed the patient's new clinical results.    Lab Results   Component Value Date    WBC 7.73 10/26/2024    HGB 12.4 (L) 10/26/2024    HCT 37.8 10/26/2024    MCV 90.6 10/26/2024     10/26/2024     Lab Results   Component Value Date    GLUCOSE 171 (H) 10/27/2024    BUN 16 10/27/2024    CREATININE 0.72 (L) 10/27/2024    EGFRIFNONA 66 11/04/2020    EGFRIFAFRI >60 11/17/2021    BCR 22.2 10/27/2024    CO2 27.2 10/27/2024    CALCIUM 9.1 10/27/2024    PROTENTOTREF 6.2 11/04/2020    ALBUMIN 3.6 10/26/2024    LABIL2 1.4 11/11/2021    AST 17 10/26/2024    ALT 19 10/26/2024       Microbiology:  10/25 blood cultures no growth to date  10/26 MRSA nares negative    Assessment    #Left foot contact dermatitis versus cellulitis  #COPD on chronic steroids    MRI has been performed and report is pending.  For now continue vancomycin goal AUC 4-600 and ceftriaxone 2 g daily.  Blood cultures no growth to date.    ID will follow.

## 2024-10-27 NOTE — PROGRESS NOTES
[facility] Clinical Pharmacy Services: Vancomycin Level Monitoring Note    Montana Franco is a 74 y.o. male who is on day 2/7 of pharmacy to dose vancomycin for Skin and Soft Tissue.    Estimated Creatinine Clearance: 88.4 mL/min (by C-G formula based on SCr of 0.78 mg/dL).    Current Vanc Dose:  1000 mg IV every  24  hours  Results from last 7 days   Lab Units 10/26/24  2120   VANCOMYCIN TR mcg/mL 4.70*   Vanc trough resulted subtherapeutic.   Predicted AUC at current dose: 259 mg/L.hr  Will change dosing regimen to vancomycin 1000 mg IV q12h. Spoke to RN and she will hang dose now.     Next Level Date and Time: Vanc Trough on 10/28 @ 0900    Pharmacy is continuing to monitor and will adjust as needed.    Josiah Rodrigues, [facility]  Clinical Pharmacist

## 2024-10-27 NOTE — PLAN OF CARE
Goal Outcome Evaluation:           Progress: improving  Outcome Evaluation: A&Ox4. VSS. 3L NC. New pulmonology consult. Echo ordered, not yet done. Scheduled nebs with RT. Cellulitis L foot. Assist x 1 BRP. BM this shift. Voids per urinal. IV vancomycin and rocephin. Vanc trough 10/28 0900. DC plan pending.

## 2024-10-27 NOTE — PROGRESS NOTES
Name: Montana Franco ADMIT: 10/25/2024   : 1950  PCP: Sunshine Ashley DENISSE    MRN: [id number] LOS: 1 days   AGE/SEX: 74 y.o. male  ROOM: P795/1     Subjective   Subjective     Sitting in bed eating lunch.  Hasn't been up out of bed much today.  Feels breathing may be a little better but still shoa with any activity.  Has seen Dr. Root and prior to that Dr. Reno Prieto. Last seen in  hasn't really recovered from his May 2024 exacerbation he says then he had cold in  and then covid + in .  Hasn't been able to start outpt PT d/t shoa.  Uses 1-4 liters 02 he reports at home.  No longer smokes.  Denies fever, chills, abd pain, n/v.  Does have some abd bloating which he feels is from Gas.  Did have BM.  Gas x helping.  Left foot pain is limiting his mobility. + non productive cough.  Uses duonebs routinely at home and is adherent to symbicort. Has been using IS.  Flutter valve not to bedside yet.        Objective   Objective   Vital Signs  Temp:  [97.6 °F (36.4 °C)-98.3 °F (36.8 °C)] 98 °F (36.7 °C)  Heart Rate:  [73-90] 80  Resp:  [16-20] 18  BP: (121-157)/(72-79) 121/72  SpO2:  [94 %-98 %] 98 %  on  Flow (L/min) (Oxygen Therapy):  [2] 2;   Device (Oxygen Therapy): nasal cannula  Body mass index is 29.76 kg/m².    Physical Exam  Vitals and nursing note reviewed.   Constitutional:       Appearance: He is obese. He is ill-appearing (chronically ill appearing).   Eyes:      General: No scleral icterus.     Conjunctiva/sclera: Conjunctivae normal.   Cardiovascular:      Rate and Rhythm: Normal rate and regular rhythm.      Pulses: Normal pulses.      Heart sounds: Normal heart sounds.   Pulmonary:      Breath sounds: Normal breath sounds. No wheezing (faint expiratory wheezing.).      Comments: Conversation dyspnea.  3 liters. Sp02 88%  Abdominal:      General: Abdomen is flat. There is no distension.      Palpations: Abdomen is soft.      Tenderness: There is no abdominal tenderness.  "  Musculoskeletal:      Right lower leg: Edema present.      Left lower leg: Edema present.      Comments: Trace LE Right foot.  Left LE trace to 1+.    Skin:     General: Skin is warm and dry.      Comments: Venous stasis changes to lower extremities. Left foot with guaze dressing in place.    Neurological:      General: No focal deficit present.      Mental Status: He is alert and oriented to person, place, and time. Mental status is at baseline.      Motor: Weakness (generalized weakness.) present.   Psychiatric:         Mood and Affect: Mood normal.         Behavior: Behavior normal.         Thought Content: Thought content normal.         Judgment: Judgment normal.         Results Review  I reviewed the patient's new clinical results.  Results from last 7 days   Lab Units 10/26/24  0447 10/25/24  1322   WBC 10*3/mm3 7.73 10.59   HEMOGLOBIN g/dL 12.4* 13.1   PLATELETS 10*3/mm3 161 157     Results from last 7 days   Lab Units 10/27/24  0447 10/26/24  0447 10/25/24  1322   SODIUM mmol/L 135* 141 138   POTASSIUM mmol/L 4.3 3.2* 3.5   CHLORIDE mmol/L 99 101 99   CO2 mmol/L 27.2 31.9* 31.7*   BUN mg/dL 16 18 29*   CREATININE mg/dL 0.72* 0.78 0.98   GLUCOSE mg/dL 171* 146* 141*     Lab Results   Component Value Date    ANIONGAP 8.8 10/27/2024     Estimated Creatinine Clearance: 95.8 mL/min (A) (by C-G formula based on SCr of 0.72 mg/dL (L)).   Lab Results   Component Value Date    EGFR 96.5 10/27/2024     Results from last 7 days   Lab Units 10/26/24  0447 10/25/24  1322   ALBUMIN g/dL 3.6 3.8   BILIRUBIN mg/dL <0.2 0.4   ALK PHOS U/L 62 66   AST (SGOT) U/L 17 18   ALT (SGPT) U/L 19 20     Results from last 7 days   Lab Units 10/27/24  0447 10/26/24  0447 10/25/24  1322   CALCIUM mg/dL 9.1 8.6 9.0   ALBUMIN g/dL  --  3.6 3.8   MAGNESIUM mg/dL  --  2.3  --      Results from last 7 days   Lab Units 10/25/24  1322   LACTATE mmol/L 0.9     No results found for: \"HGBA1C\", \"POCGLU\"    XR Chest 1 View    Result Date: " 10/25/2024  Small bibasilar likely atelectasis or scarring. Pulmonary hyperinflation. Follow-up as clinical indications persist.  This report was finalized on 10/25/2024 4:01 PM by Dr. Hai Martell M.D on Workstation: [id number]      XR Foot 3+ View Left    Result Date: 10/25/2024   As described.    This report was finalized on 10/25/2024 2:06 PM by Dr. Hai Martell M.D on Workstation: [email or web address]         Current Facility-Administered Medications:     acetaminophen (TYLENOL) tablet 650 mg, 650 mg, Oral, Q4H PRN, 650 mg at 10/27/24 0141 **OR** acetaminophen (TYLENOL) 160 MG/5ML oral solution 650 mg, 650 mg, Oral, Q4H PRN **OR** acetaminophen (TYLENOL) suppository 650 mg, 650 mg, Rectal, Q4H PRN, Joshua Novoa MD    albuterol (PROVENTIL) nebulizer solution 0.083% 2.5 mg/3mL, 2.5 mg, Nebulization, Q6H PRN, Joshua Novoa MD, 2.5 mg at 10/26/24 1747    aspirin chewable tablet 81 mg, 81 mg, Oral, Daily, Joshua Novoa MD, 81 mg at 10/27/24 1010    budesonide-formoterol (SYMBICORT) 160-4.5 MCG/ACT inhaler 2 puff, 2 puff, Inhalation, BID - RT, Joshua Novoa MD, 2 puff at 10/27/24 0709    Calcium Replacement - Follow Nurse / BPA Driven Protocol, , Does not apply, PRN, Gayla Scott, APRN    cefTRIAXone (ROCEPHIN) 2,000 mg in sodium chloride 0.9 % 100 mL MBP, 2,000 mg, Intravenous, Q24H, Joshua Novoa MD, Last Rate: 200 mL/hr at 10/26/24 2352, 2,000 mg at 10/26/24 2352    cholecalciferol (VITAMIN D3) tablet 5,000 Units, 5,000 Units, Oral, Daily, Joshua Novoa MD, 5,000 Units at 10/27/24 1009    dexAMETHasone (DECADRON) tablet 2 mg, 2 mg, Oral, Q12H, Gayla Scott, APRN, 2 mg at 10/27/24 1019    Enoxaparin Sodium (LOVENOX) syringe 40 mg, 40 mg, Subcutaneous, Daily, Joshua Novoa MD, 40 mg at 10/27/24 1009    hydroCHLOROthiazide tablet 37.5 mg, 37.5 mg, Oral, Daily, Joshua Novoa MD, 37.5 mg at 10/27/24 1009    HYDROcodone-acetaminophen (NORCO) 5-325 MG per tablet 1 tablet, 1 tablet, Oral, Q6H PRN, Joshua Novoa,  MD    ipratropium-albuterol (DUO-NEB) nebulizer solution 3 mL, 3 mL, Nebulization, 4x Daily - RT, Gayla Scott APRN, 3 mL at 10/27/24 1053    losartan (COZAAR) tablet 50 mg, 50 mg, Oral, Daily, Joshua Novoa MD, 50 mg at 10/27/24 1010    Magnesium Standard Dose Replacement - Follow Nurse / BPA Driven Protocol, , Does not apply, PRN, Gayla Scott APRN    ondansetron (ZOFRAN) injection 4 mg, 4 mg, Intravenous, Q6H PRN, Joshua Novoa MD    Pharmacy to dose vancomycin, , Does not apply, Continuous PRNBright Jawed, MD    Phosphorus Replacement - Follow Nurse / BPA Driven Protocol, , Does not apply, PRN, Gayla Scott APRN    Potassium Replacement - Follow Nurse / BPA Driven Protocol, , Does not apply, PRN, Gayla Scott APRN    simethicone (MYLICON) chewable tablet 80 mg, 80 mg, Oral, 4x Daily PRN, Gayla Scott APRN, 80 mg at 10/27/24 1019    sodium chloride 0.9 % flush 10 mL, 10 mL, Intravenous, Q12H, Joshua Novoa MD, 10 mL at 10/27/24 1010    sodium chloride 0.9 % flush 10 mL, 10 mL, Intravenous, PRN, Joshua Novoa MD    vancomycin (VANCOCIN) 1,000 mg in sodium chloride 0.9 % 250 mL IVPB-VTB, 1,000 mg, Intravenous, Q12H, Joshua Novoa MD, Last Rate: 250 mL/hr at 10/27/24 1009, 1,000 mg at 10/27/24 1009      Pharmacy to dose vancomycin,     Diet  Diet: Regular/House; Fluid Consistency: Thin (IDDSI 0)       Assessment/Plan     Active Hospital Problems    Diagnosis  POA    **Cellulitis of left foot [L03.116]  Yes    Hypertension [I10]  Yes    Pulmonary emphysema [J43.9]  Yes    Low back pain [M54.50]  Yes      Resolved Hospital Problems   No resolved problems to display.     74 y.o. male with past medical history for COPD with chronic hypoxia on chronic O2 supplementation, chronic steroid use with dexamethasone, history of hypertension, and chronic venous stasis ulcers of lower extremities who presented to [facility] with complaints of redness, swelling and pain to an open  wound to the dorsum of his left foot.  He is followed by wound care as an outpatient and recently had developed a large fluid-filled blister to the dorsum of his foot which he reported was the size of a baseball.  Earlier this week the blister drained and he was left with an open wound without overlying tissue present.  Wound care had placed him on Keflex and he was evaluated in the outpatient setting by wound care who advised him to seek further treatment with IV antibiotics at [facility].  Noted to be COVID-positive 10/9/2024 denies any increased shortness of breath or chest pain on admission.    Cellulitis vs contact dermatitis of left foot  -Recent treatment and outpatient wound care with Keflex  -MRI of left foot pending report   -Prior history of Enterococcus in October 2023 and he was treated with Zyvox.  He has been followed in the past by Dr. Durbin with infectious diseases.  -He has chronic venous stasis dermatitis and has been followed by vascular and podiatry service as an outpatient.  -No leukocytosis on admission or systemic signs of sepsis, lactate normal  -He is afebrile  -X-ray of the left foot without acute pathology  -MRSA screen negative.    -Ceftriaxone and vanc currently ordered.  ID is following and recommends continuation of ceftriaxone and vancomycin for now while awaiting results of left foot MRI.  Patient has been seen in the past by Dr. Durbin for prior Enterococcus infection.  -PT    Hypertension  -losartan 50 mg daily and HCTZ as outpatient this has been continued inpatient  -Some lability in blood pressure with ranges anywhere from 120-1 50s systolic with diastolic ranging in the 70s.   For now continue current dosing of losartan and HCTZ.  -Monitor blood pressure and avoid hypotension    COPD with chronic hypoxic respiratory failure on home 02.   - on 3 liters currently.   -reports 1 L-4 L nasal cannula as an outpatient this has been continued and currently on 3 liters with  spo2 88%-90%  -chronic Steroid use with dexamethasone as an outpatient 1 mg daily with increase to 2 - 4 mg total daily for increased shortness of breath per patient.  10/26 increased to 2 mg BID for 3 days and reassess.  He is hesitant to do prednisone or IV steroids stating it tends to lead to increased lower extremity swelling.   His recent covid infection may have lead to flare.    - continued scheduled duonebs and home Symbicort  - chest xray 10/25 Small bibasilar likely atelectasis or scarring. Pulmonary  hyperinflation.   - low dose chest CT 5/2024 showed NO suspicious pulmonary opacities or nodules.  Has partially opacified bronchi at the lower lobes with mucous /secretion along the right mainstem bronchus.  Repeat CT was recommended in 12 months.    - reorder flutter valve.  Continue IS.    - add mucinex.  He says he does not tolerate 1200 mg dosing.  Will start 600 mg BID.   - Pt has been seen in past by Dr. Root 6/2024 and prior to that Dr. Reno Prieto.   He feels quite hopeless about seeing any improvement in his lung status so has not reached out to pulmonary for repeat evaluation.  I have recommended inpt evaluation by pulmonary and he is agreeable today.       Hypokalemia (resolved)  -Resolved after replacement 10/26  - mag normal at 2.3     DVT prophylaxis  Lovenox DVT dosing at 40 mg    Discharge  Expected discharge date/ time has not been documented.  To Be determined    Discussed with patient and nursing staff.  Pt lives alone and may need short stay in rehab post discharge given his mobility issues.     DENISSE Lake  [facility]

## 2024-10-27 NOTE — PROGRESS NOTES
Psychiatric Clinical Pharmacy Services: Vancomycin Level Monitoring Note    Montana Franco is a 74 y.o. male who is on day 2/7 of pharmacy to dose vancomycin for Skin and Soft Tissue.    Estimated Creatinine Clearance: 88.4 mL/min (by C-G formula based on SCr of 0.78 mg/dL).    Current Vanc Dose:  1000 mg IV every  24  hours  Results from last 7 days   Lab Units 10/26/24  2120   VANCOMYCIN TR mcg/mL 4.70*   Vanc trough resulted subtherapeutic.   Predicted AUC at current dose: 259 mg/L.hr  Will change dosing regimen to vancomycin 1000 mg IV q12h to provide a predicted AUC of 515 mg/L.hr. Spoke to RN and she will hang dose now.     Next Level Date and Time: Vanc Trough on 10/28 @ 0900    Pharmacy is continuing to monitor and will adjust as needed.    Josiah Rodrigues, [facility]  Clinical Pharmacist

## 2024-10-28 ENCOUNTER — APPOINTMENT (OUTPATIENT)
Dept: CARDIOLOGY | Facility: HOSPITAL | Age: 74
End: 2024-10-28
Payer: MEDICARE

## 2024-10-28 PROBLEM — R73.9 HYPERGLYCEMIA: Status: ACTIVE | Noted: 2024-10-28

## 2024-10-28 LAB
ANION GAP SERPL CALCULATED.3IONS-SCNC: 6.3 MMOL/L (ref 5–15)
AORTIC DIMENSIONLESS INDEX: 0.9 (DI)
BASOPHILS # BLD AUTO: 0.06 10*3/MM3 (ref 0–0.2)
BASOPHILS NFR BLD AUTO: 0.6 % (ref 0–1.5)
BH CV ECHO MEAS - AO MAX PG: 3.6 MMHG
BH CV ECHO MEAS - AO MEAN PG: 2 MMHG
BH CV ECHO MEAS - AO ROOT DIAM: 3.1 CM
BH CV ECHO MEAS - AO V2 MAX: 95 CM/SEC
BH CV ECHO MEAS - AO V2 VTI: 17.9 CM
BH CV ECHO MEAS - AVA(I,D): 3.1 CM2
BH CV ECHO MEAS - EDV(CUBED): 54.9 ML
BH CV ECHO MEAS - EDV(MOD-SP2): 99 ML
BH CV ECHO MEAS - EDV(MOD-SP4): 90 ML
BH CV ECHO MEAS - EF(MOD-BP): 68.2 %
BH CV ECHO MEAS - EF(MOD-SP2): 60.6 %
BH CV ECHO MEAS - EF(MOD-SP4): 76.7 %
BH CV ECHO MEAS - ESV(CUBED): 20.5 ML
BH CV ECHO MEAS - ESV(MOD-SP2): 39 ML
BH CV ECHO MEAS - ESV(MOD-SP4): 21 ML
BH CV ECHO MEAS - FS: 28 %
BH CV ECHO MEAS - IVS/LVPW: 0.78 CM
BH CV ECHO MEAS - IVSD: 0.7 CM
BH CV ECHO MEAS - LAT PEAK E' VEL: 10.9 CM/SEC
BH CV ECHO MEAS - LV DIASTOLIC VOL/BSA (35-75): 45.5 CM2
BH CV ECHO MEAS - LV MASS(C)D: 86 GRAMS
BH CV ECHO MEAS - LV MAX PG: 3.5 MMHG
BH CV ECHO MEAS - LV MEAN PG: 2 MMHG
BH CV ECHO MEAS - LV SYSTOLIC VOL/BSA (12-30): 10.6 CM2
BH CV ECHO MEAS - LV V1 MAX: 93.9 CM/SEC
BH CV ECHO MEAS - LV V1 VTI: 17 CM
BH CV ECHO MEAS - LVIDD: 3.8 CM
BH CV ECHO MEAS - LVIDS: 2.7 CM
BH CV ECHO MEAS - LVOT AREA: 3.2 CM2
BH CV ECHO MEAS - LVOT DIAM: 2.02 CM
BH CV ECHO MEAS - LVPWD: 0.9 CM
BH CV ECHO MEAS - MED PEAK E' VEL: 8.3 CM/SEC
BH CV ECHO MEAS - MV A DUR: 0.07 SEC
BH CV ECHO MEAS - MV A MAX VEL: 95.5 CM/SEC
BH CV ECHO MEAS - MV DEC SLOPE: 408.4 CM/SEC2
BH CV ECHO MEAS - MV DEC TIME: 193 SEC
BH CV ECHO MEAS - MV E MAX VEL: 58.8 CM/SEC
BH CV ECHO MEAS - MV E/A: 0.62
BH CV ECHO MEAS - MV MAX PG: 4.5 MMHG
BH CV ECHO MEAS - MV MEAN PG: 1.59 MMHG
BH CV ECHO MEAS - MV P1/2T: 55.5 MSEC
BH CV ECHO MEAS - MV V2 VTI: 21.2 CM
BH CV ECHO MEAS - MVA(P1/2T): 4 CM2
BH CV ECHO MEAS - MVA(VTI): 2.6 CM2
BH CV ECHO MEAS - PA ACC TIME: 0.1 SEC
BH CV ECHO MEAS - PA V2 MAX: 112.7 CM/SEC
BH CV ECHO MEAS - PULM A REVS DUR: 0.1 SEC
BH CV ECHO MEAS - PULM A REVS VEL: 32.4 CM/SEC
BH CV ECHO MEAS - PULM DIAS VEL: 29.3 CM/SEC
BH CV ECHO MEAS - PULM S/D: 1.64
BH CV ECHO MEAS - PULM SYS VEL: 47.9 CM/SEC
BH CV ECHO MEAS - RAP SYSTOLE: 3 MMHG
BH CV ECHO MEAS - RV MAX PG: 1.42 MMHG
BH CV ECHO MEAS - RV V1 MAX: 59.6 CM/SEC
BH CV ECHO MEAS - RV V1 VTI: 11.7 CM
BH CV ECHO MEAS - SV(LVOT): 54.7 ML
BH CV ECHO MEAS - SV(MOD-SP2): 60 ML
BH CV ECHO MEAS - SV(MOD-SP4): 69 ML
BH CV ECHO MEAS - SVI(LVOT): 27.6 ML/M2
BH CV ECHO MEAS - SVI(MOD-SP2): 30.3 ML/M2
BH CV ECHO MEAS - SVI(MOD-SP4): 34.9 ML/M2
BH CV ECHO MEAS - TAPSE (>1.6): 2.8 CM
BH CV ECHO MEASUREMENTS AVERAGE E/E' RATIO: 6.13
BH CV XLRA - RV BASE: 3.6 CM
BH CV XLRA - TDI S': 17.8 CM/SEC
BUN SERPL-MCNC: 17 MG/DL (ref 8–23)
BUN/CREAT SERPL: 26.2 (ref 7–25)
CALCIUM SPEC-SCNC: 9.3 MG/DL (ref 8.6–10.5)
CHLORIDE SERPL-SCNC: 100 MMOL/L (ref 98–107)
CO2 SERPL-SCNC: 30.7 MMOL/L (ref 22–29)
CREAT SERPL-MCNC: 0.65 MG/DL (ref 0.76–1.27)
DEPRECATED RDW RBC AUTO: 40.1 FL (ref 37–54)
EGFRCR SERPLBLD CKD-EPI 2021: 99.5 ML/MIN/1.73
EOSINOPHIL # BLD AUTO: 0.12 10*3/MM3 (ref 0–0.4)
EOSINOPHIL NFR BLD AUTO: 1.2 % (ref 0.3–6.2)
ERYTHROCYTE [DISTWIDTH] IN BLOOD BY AUTOMATED COUNT: 12.1 % (ref 12.3–15.4)
GLUCOSE BLDC GLUCOMTR-MCNC: 106 MG/DL (ref 70–130)
GLUCOSE BLDC GLUCOMTR-MCNC: 113 MG/DL (ref 70–130)
GLUCOSE BLDC GLUCOMTR-MCNC: 130 MG/DL (ref 70–130)
GLUCOSE SERPL-MCNC: 144 MG/DL (ref 65–99)
HBA1C MFR BLD: 5.8 % (ref 4.8–5.6)
HCT VFR BLD AUTO: 37.7 % (ref 37.5–51)
HGB BLD-MCNC: 12.3 G/DL (ref 13–17.7)
IMM GRANULOCYTES # BLD AUTO: 0.06 10*3/MM3 (ref 0–0.05)
IMM GRANULOCYTES NFR BLD AUTO: 0.6 % (ref 0–0.5)
LEFT ATRIUM VOLUME INDEX: 16.9 ML/M2
LYMPHOCYTES # BLD AUTO: 0.91 10*3/MM3 (ref 0.7–3.1)
LYMPHOCYTES NFR BLD AUTO: 9.2 % (ref 19.6–45.3)
MCH RBC QN AUTO: 29.7 PG (ref 26.6–33)
MCHC RBC AUTO-ENTMCNC: 32.6 G/DL (ref 31.5–35.7)
MCV RBC AUTO: 91.1 FL (ref 79–97)
MONOCYTES # BLD AUTO: 0.94 10*3/MM3 (ref 0.1–0.9)
MONOCYTES NFR BLD AUTO: 9.5 % (ref 5–12)
NEUTROPHILS NFR BLD AUTO: 7.83 10*3/MM3 (ref 1.7–7)
NEUTROPHILS NFR BLD AUTO: 78.9 % (ref 42.7–76)
NRBC BLD AUTO-RTO: 0 /100 WBC (ref 0–0.2)
PLATELET # BLD AUTO: 200 10*3/MM3 (ref 140–450)
PMV BLD AUTO: 9.8 FL (ref 6–12)
POTASSIUM SERPL-SCNC: 4.5 MMOL/L (ref 3.5–5.2)
RBC # BLD AUTO: 4.14 10*6/MM3 (ref 4.14–5.8)
SODIUM SERPL-SCNC: 137 MMOL/L (ref 136–145)
VANCOMYCIN TROUGH SERPL-MCNC: 8.4 MCG/ML (ref 5–20)
WBC NRBC COR # BLD AUTO: 9.92 10*3/MM3 (ref 3.4–10.8)

## 2024-10-28 PROCEDURE — 83516 IMMUNOASSAY NONANTIBODY: CPT | Performed by: INTERNAL MEDICINE

## 2024-10-28 PROCEDURE — 94664 DEMO&/EVAL PT USE INHALER: CPT

## 2024-10-28 PROCEDURE — 25810000003 SODIUM CHLORIDE 0.9 % SOLUTION 250 ML FLEX CONT: Performed by: INTERNAL MEDICINE

## 2024-10-28 PROCEDURE — 86037 ANCA TITER EACH ANTIBODY: CPT | Performed by: INTERNAL MEDICINE

## 2024-10-28 PROCEDURE — 85025 COMPLETE CBC W/AUTO DIFF WBC: CPT | Performed by: NURSE PRACTITIONER

## 2024-10-28 PROCEDURE — 94799 UNLISTED PULMONARY SVC/PX: CPT

## 2024-10-28 PROCEDURE — 83036 HEMOGLOBIN GLYCOSYLATED A1C: CPT | Performed by: NURSE PRACTITIONER

## 2024-10-28 PROCEDURE — 94761 N-INVAS EAR/PLS OXIMETRY MLT: CPT

## 2024-10-28 PROCEDURE — 93306 TTE W/DOPPLER COMPLETE: CPT | Performed by: INTERNAL MEDICINE

## 2024-10-28 PROCEDURE — 80048 BASIC METABOLIC PNL TOTAL CA: CPT | Performed by: NURSE PRACTITIONER

## 2024-10-28 PROCEDURE — 99232 SBSQ HOSP IP/OBS MODERATE 35: CPT | Performed by: INTERNAL MEDICINE

## 2024-10-28 PROCEDURE — 25510000001 PERFLUTREN 6.52 MG/ML SUSPENSION 2 ML VIAL: Performed by: INTERNAL MEDICINE

## 2024-10-28 PROCEDURE — 93306 TTE W/DOPPLER COMPLETE: CPT

## 2024-10-28 PROCEDURE — 80202 ASSAY OF VANCOMYCIN: CPT | Performed by: INTERNAL MEDICINE

## 2024-10-28 PROCEDURE — 97530 THERAPEUTIC ACTIVITIES: CPT

## 2024-10-28 PROCEDURE — 25010000002 VANCOMYCIN 1 G RECONSTITUTED SOLUTION 1 EACH VIAL: Performed by: INTERNAL MEDICINE

## 2024-10-28 PROCEDURE — 25010000002 ENOXAPARIN PER 10 MG: Performed by: INTERNAL MEDICINE

## 2024-10-28 PROCEDURE — 25010000002 VANCOMYCIN HCL 1.25 G RECONSTITUTED SOLUTION 1 EACH VIAL: Performed by: INTERNAL MEDICINE

## 2024-10-28 PROCEDURE — 82948 REAGENT STRIP/BLOOD GLUCOSE: CPT

## 2024-10-28 RX ORDER — GUAIFENESIN 200 MG/10ML
200 LIQUID ORAL EVERY 6 HOURS
Status: DISCONTINUED | OUTPATIENT
Start: 2024-10-28 | End: 2024-10-31 | Stop reason: HOSPADM

## 2024-10-28 RX ORDER — NICOTINE POLACRILEX 4 MG
15 LOZENGE BUCCAL
Status: DISCONTINUED | OUTPATIENT
Start: 2024-10-28 | End: 2024-10-31 | Stop reason: HOSPADM

## 2024-10-28 RX ORDER — GUAIFENESIN 200 MG/10ML
200 LIQUID ORAL EVERY 6 HOURS PRN
Status: DISCONTINUED | OUTPATIENT
Start: 2024-10-28 | End: 2024-10-31 | Stop reason: HOSPADM

## 2024-10-28 RX ORDER — IBUPROFEN 600 MG/1
1 TABLET ORAL
Status: DISCONTINUED | OUTPATIENT
Start: 2024-10-28 | End: 2024-10-31 | Stop reason: HOSPADM

## 2024-10-28 RX ORDER — INSULIN LISPRO 100 [IU]/ML
2-7 INJECTION, SOLUTION INTRAVENOUS; SUBCUTANEOUS
Status: DISCONTINUED | OUTPATIENT
Start: 2024-10-28 | End: 2024-10-31 | Stop reason: HOSPADM

## 2024-10-28 RX ORDER — ACETYLCYSTEINE 200 MG/ML
3 SOLUTION ORAL; RESPIRATORY (INHALATION)
Status: DISCONTINUED | OUTPATIENT
Start: 2024-10-28 | End: 2024-10-31 | Stop reason: HOSPADM

## 2024-10-28 RX ORDER — DEXTROSE MONOHYDRATE 25 G/50ML
25 INJECTION, SOLUTION INTRAVENOUS
Status: DISCONTINUED | OUTPATIENT
Start: 2024-10-28 | End: 2024-10-31 | Stop reason: HOSPADM

## 2024-10-28 RX ADMIN — Medication 10 ML: at 09:14

## 2024-10-28 RX ADMIN — SIMETHICONE 80 MG: 80 TABLET, CHEWABLE ORAL at 09:21

## 2024-10-28 RX ADMIN — BUDESONIDE AND FORMOTEROL FUMARATE DIHYDRATE 2 PUFF: 160; 4.5 AEROSOL RESPIRATORY (INHALATION) at 06:49

## 2024-10-28 RX ADMIN — Medication 5000 UNITS: at 09:13

## 2024-10-28 RX ADMIN — GUAIFENESIN 200 MG: 100 LIQUID ORAL at 15:26

## 2024-10-28 RX ADMIN — PERFLUTREN 2 ML: 6.52 INJECTION, SUSPENSION INTRAVENOUS at 17:13

## 2024-10-28 RX ADMIN — Medication 4 ML: at 06:48

## 2024-10-28 RX ADMIN — BUDESONIDE AND FORMOTEROL FUMARATE DIHYDRATE 2 PUFF: 160; 4.5 AEROSOL RESPIRATORY (INHALATION) at 20:22

## 2024-10-28 RX ADMIN — IPRATROPIUM BROMIDE AND ALBUTEROL SULFATE 3 ML: 2.5; .5 SOLUTION RESPIRATORY (INHALATION) at 06:47

## 2024-10-28 RX ADMIN — ENOXAPARIN SODIUM 40 MG: 100 INJECTION SUBCUTANEOUS at 09:13

## 2024-10-28 RX ADMIN — VANCOMYCIN HYDROCHLORIDE 1000 MG: 1 INJECTION, POWDER, LYOPHILIZED, FOR SOLUTION INTRAVENOUS at 11:08

## 2024-10-28 RX ADMIN — DEXAMETHASONE 2 MG: 2 TABLET ORAL at 21:20

## 2024-10-28 RX ADMIN — GUAIFENESIN 200 MG: 100 LIQUID ORAL at 21:20

## 2024-10-28 RX ADMIN — Medication 10 ML: at 21:20

## 2024-10-28 RX ADMIN — ACETYLCYSTEINE 3 ML: 200 SOLUTION ORAL; RESPIRATORY (INHALATION) at 20:20

## 2024-10-28 RX ADMIN — LOSARTAN POTASSIUM 50 MG: 50 TABLET, FILM COATED ORAL at 09:13

## 2024-10-28 RX ADMIN — VANCOMYCIN HYDROCHLORIDE 1250 MG: 1.25 INJECTION, POWDER, LYOPHILIZED, FOR SOLUTION INTRAVENOUS at 21:20

## 2024-10-28 RX ADMIN — IPRATROPIUM BROMIDE AND ALBUTEROL SULFATE 3 ML: 2.5; .5 SOLUTION RESPIRATORY (INHALATION) at 11:02

## 2024-10-28 RX ADMIN — HYDROCHLOROTHIAZIDE 37.5 MG: 12.5 TABLET ORAL at 09:13

## 2024-10-28 RX ADMIN — DEXAMETHASONE 2 MG: 2 TABLET ORAL at 09:13

## 2024-10-28 RX ADMIN — IPRATROPIUM BROMIDE AND ALBUTEROL SULFATE 3 ML: 2.5; .5 SOLUTION RESPIRATORY (INHALATION) at 14:43

## 2024-10-28 RX ADMIN — ACETYLCYSTEINE 3 ML: 200 SOLUTION ORAL; RESPIRATORY (INHALATION) at 14:44

## 2024-10-28 RX ADMIN — IPRATROPIUM BROMIDE AND ALBUTEROL SULFATE 3 ML: 2.5; .5 SOLUTION RESPIRATORY (INHALATION) at 20:18

## 2024-10-28 RX ADMIN — ASPIRIN 81 MG: 81 TABLET, CHEWABLE ORAL at 09:13

## 2024-10-28 NOTE — PLAN OF CARE
Goal Outcome Evaluation:  Plan of Care Reviewed With: patient        Progress: no change  Outcome Evaluation: Pt seen for PT this PM and tolerated mobility well. Pt transferred to EOB with SBA and stood with SBA, and ambulated 5ft to chair with SBA-CGA. Pt cued to offweight onto rwx, c/o L foot pain with mobility but tolerable for short distance. Pt mobilized on 2L O2, satting 90% just with transfer to chair and notably SOA. Pt with several concerns about his overall medical status, encouraged to discuss with hospitalist. Pt remains unsafe to DC home, recommend DC to SNF, will continue to follow.    Anticipated Discharge Disposition (PT): skilled nursing facility

## 2024-10-28 NOTE — PROGRESS NOTES
Name: Montana Franco ADMIT: 10/25/2024   : 1950  PCP: Sunshine Ashley APRN    MRN: [id number] LOS: 2 days   AGE/SEX: 74 y.o. male  ROOM: [facility]     Subjective   Subjective   Appears comfortable and in no apparent distress.  Reports left foot pain with ambulation and requesting rehab.  States swelling due to chronic use of steroids for ineffective treatment of COPD.       Objective   Objective   Vital Signs  Temp:  [97.7 °F (36.5 °C)-98.8 °F (37.1 °C)] 98.8 °F (37.1 °C)  Heart Rate:  [70-96] 72  Resp:  [16-20] 18  BP: (131-155)/(72-87) 138/80  SpO2:  [95 %-100 %] 100 %  on  Flow (L/min) (Oxygen Therapy):  [2-3] 2;   Device (Oxygen Therapy): nasal cannula  Body mass index is 29.76 kg/m².    Physical Exam  Constitutional:       General: He is not in acute distress.     Appearance: He is not toxic-appearing.   Cardiovascular:      Rate and Rhythm: Normal rate.      Heart sounds: Normal heart sounds.   Pulmonary:      Effort: Pulmonary effort is normal.      Breath sounds: Wheezing (RUL) present.   Abdominal:      General: Bowel sounds are normal.      Palpations: Abdomen is soft.   Musculoskeletal:         General: Tenderness (left foot) present.      Right lower leg: Edema present.      Left lower leg: Edema present.   Skin:     General: Skin is warm and dry.      Findings: Erythema (LLE with ruptured blister; cellulitis) present.   Neurological:      Mental Status: He is alert and oriented to person, place, and time.       Results Review     I reviewed the patient's new clinical results.  Results from last 7 days   Lab Units 10/28/24  0409 10/26/24  0447 10/25/24  1322   WBC 10*3/mm3 9.92 7.73 10.59   HEMOGLOBIN g/dL 12.3* 12.4* 13.1   PLATELETS 10*3/mm3 200 161 157     Results from last 7 days   Lab Units 10/28/24  0409 10/27/24  0447 10/26/24  0447 10/25/24  1322   SODIUM mmol/L 137 135* 141 138   POTASSIUM mmol/L 4.5 4.3 3.2* 3.5   CHLORIDE mmol/L 100 99 101 99   CO2 mmol/L 30.7* 27.2 31.9* 31.7*   BUN  "mg/dL 17 16 18 29*   CREATININE mg/dL 0.65* 0.72* 0.78 0.98   GLUCOSE mg/dL 144* 171* 146* 141*   EGFR mL/min/1.73 99.5 96.5 94.2 81.4     Results from last 7 days   Lab Units 10/26/24  0447 10/25/24  1322   ALBUMIN g/dL 3.6 3.8   BILIRUBIN mg/dL <0.2 0.4   ALK PHOS U/L 62 66   AST (SGOT) U/L 17 18   ALT (SGPT) U/L 19 20     Results from last 7 days   Lab Units 10/28/24  0409 10/27/24  0447 10/26/24  0447 10/25/24  1322   CALCIUM mg/dL 9.3 9.1 8.6 9.0   ALBUMIN g/dL  --   --  3.6 3.8   MAGNESIUM mg/dL  --   --  2.3  --      Results from last 7 days   Lab Units 10/25/24  1322   LACTATE mmol/L 0.9     No results found for: \"HGBA1C\", \"POCGLU\"    MRI Foot Left With & Without Contrast    Result Date: 10/27/2024   1. Diffuse soft tissue edema, trace soft tissue fluid, and amorphous soft tissue enhancement, greatest at the dorsal forefoot and partially included midfoot, with a suspected skin blister at the dorsal medial midfoot and proximal forefoot and skin surface irregularity throughout the dorsum of the forefoot and partially imaged midfoot. No well-formed or drainable fluid collection to suggest abscess. 2. Large region of relatively decreased tissue enhancement at the plantar distal forefoot, suggesting soft tissue necrosis or hypovascularity. 3. No MRI evidence of osteomyelitis.     This report was finalized on 10/27/2024 12:26 PM by Rigo Lyons MD on Workstation: [id number]       I have personally reviewed all medications:  Scheduled Medications  aspirin, 81 mg, Oral, Daily  budesonide-formoterol, 2 puff, Inhalation, BID - RT  cefTRIAXone, 2,000 mg, Intravenous, Q24H  cholecalciferol, 5,000 Units, Oral, Daily  dexAMETHasone, 2 mg, Oral, Q12H  enoxaparin, 40 mg, Subcutaneous, Daily  guaiFENesin, 600 mg, Oral, Q12H  hydroCHLOROthiazide, 37.5 mg, Oral, Daily  ipratropium-albuterol, 3 mL, Nebulization, 4x Daily - RT  losartan, 50 mg, Oral, Daily  sodium chloride, 10 mL, Intravenous, Q12H  sodium chloride, 4 mL, " Nebulization, BID - RT  vancomycin, 1,000 mg, Intravenous, Q12H    Infusions  Pharmacy to dose vancomycin,     Diet  Diet: Regular/House; Fluid Consistency: Thin (IDDSI 0)    I have personally reviewed:  [x]  Laboratory   [x]  Microbiology   []  Radiology   []  EKG/Telemetry  []  Cardiology/Vascular   []  Pathology    []  Records       Assessment/Plan     Active Hospital Problems    Diagnosis  POA    **Cellulitis of left foot [L03.116]  Yes    Hyperglycemia [R73.9]  Yes    Hypertension [I10]  Yes    Pulmonary emphysema [J43.9]  Yes    Low back pain [M54.50]  Yes      Resolved Hospital Problems   No resolved problems to display.       74 y.o. male admitted with Cellulitis of left foot.    Cellulitis of left foot: No growth to date on blood cultures.  MRSA nares negative.  Infectious disease following--plan vancomycin and ceftriaxone daily for now.  MRI left foot no evidence of osteomyelitis.     Left foot pain: Due to above.  Hydrocodone 5 mg p.o. every 6 hours as needed available.  No utility per MAR.  PT following recommend rehab.     Severe COPD with chronic respiratory failure with hypoxia requiring 3 L nasal cannula continuously at baseline.  Pulmonology following recommend outpatient pulmonary rehab.  ANCA panel pending.  TTE ordered.  Refusing vaccines.    Chronic bronchitis treated with flutter valve, nebulized hypertonic saline, pulmonary toilet.    Hyperglycemia in the setting of steroid use & states frequent steroid use PTA.  Ordering A1c.  Provide low dose correctional sliding scale for now.    Lovenox 40 mg SC daily for DVT prophylaxis.  Full code.  Discussed with patient and nursing staff.  Anticipate discharge home with HH vs SNU facility timing yet to be determined.--pending ID rec & facility arrangements--CCP following  Expected discharge date/ time has not been documented.      DENISSE Valenzuela  [facility]  10/28/24  10:00 EDT

## 2024-10-28 NOTE — PLAN OF CARE
Goal Outcome Evaluation:  Plan of Care Reviewed With: patient           Outcome Evaluation: Patient remains stable. Alert and oriented. Ambulating with assist x1. Scheduled nebulizer treatment. L foot open blister wraped with kerlix, wound to see pt today. Voiding per urinal. IV abx given per order. Plans to d/c pending.

## 2024-10-28 NOTE — CASE MANAGEMENT/SOCIAL WORK
Discharge Planning Assessment  [facility]     Patient Name: Montana Franco  MRN: [id number]  Today's Date: 10/28/2024    Admit Date: 10/25/2024    Plan: SNF-follow up for facility choices   Discharge Needs Assessment       Row Name 10/28/24 1107       Living Environment    People in Home alone    Current Living Arrangements home    Potentially Unsafe Housing Conditions none    Primary Care Provided by self    Provides Primary Care For no one    Family Caregiver if Needed none    Able to Return to Prior Arrangements no    Living Arrangement Comments needs SNF       Resource/Environmental Concerns    Resource/Environmental Concerns none    Transportation Concerns none       Transition Planning    Patient/Family Anticipates Transition to inpatient rehabilitation facility    Patient/Family Anticipated Services at Transition     Transportation Anticipated health plan transportation;family or friend will provide       Discharge Needs Assessment    Equipment Currently Used at Home cane, quad;oxygen                   Discharge Plan       Row Name 10/28/24 1107       Plan    Plan SNF-follow up for facility choices    Patient/Family in Agreement with Plan yes    Plan Comments Spoke with patient at bedside. Introduced self and explained role. Facesheet verified. Patient lives alone in a multi level house. His neighbor has been helping him move everything to the main level so that he can stay on that floor. At baseline, patient is IADLS and drives. Due to pain in foot, patient has been using a cane and staying in the house for the past month. He also has o2 from [place] that uses prn. Patient is interested in SNF @ [facility]. Provided patient with SNF list. He is going to ask several friends/neighbors about facility recs and has asked CCP to follow up with him. Provided patient with CCP contact info and encouraged him to call once he has 2-3 SNF options. Transfer packet started and in CCP office. CCP will continue to  follow.                  Continued Care and Services - Admitted Since 10/25/2024    No active coordination exists for this encounter.          Demographic Summary       Row Name 10/28/24 1106       General Information    Admission Type inpatient    Arrived From emergency department    Required Notices Provided Important Message from Medicare    Referral Source admission list    Reason for Consult discharge planning    Preferred Language English                   Functional Status       Row Name 10/28/24 1106       Functional Status    Usual Activity Tolerance good    Current Activity Tolerance fair       Functional Status, IADL    Medications independent    Meal Preparation independent    Housekeeping independent    Laundry independent    Shopping independent    If for any reason you need help with day-to-day activities such as bathing, preparing meals, shopping, managing finances, etc., do you get the help you need? I could use a little more help       Mental Status    General Appearance WDL WDL                   Psychosocial    No documentation.                  Abuse/Neglect    No documentation.                  Legal    No documentation.                  Substance Abuse    No documentation.                  Patient Forms    No documentation.                     Mague Franklin RN

## 2024-10-28 NOTE — PROGRESS NOTES
LOS: 2 days     Chief Complaint: Cellulitis    Interval History: Afebrile, tolerating antibiotics without abdominal pain vomiting diarrhea or rash.    Vital Signs  Temp:  [97.7 °F (36.5 °C)-97.9 °F (36.6 °C)] 97.7 °F (36.5 °C)  Heart Rate:  [70-96] 72  Resp:  [16-20] 18  BP: (131-155)/(72-87) 133/72    Physical Exam:  General: In no acute distress  Respiratory: Breathing comfortably on room air  Skin: Erythema of the left foot however site now dressed with no evidence of purulence.  Extremities: Decreased edema of the left foot.  Access: Peripheral IV    Antibiotics:  Vancomycin dosing per pharmacy  Ceftriaxone 2 g IV every 24 hours     Results Review:    Lab Results   Component Value Date    WBC 9.92 10/28/2024    HGB 12.3 (L) 10/28/2024    HCT 37.7 10/28/2024    MCV 91.1 10/28/2024     10/28/2024     Lab Results   Component Value Date    GLUCOSE 144 (H) 10/28/2024    BUN 17 10/28/2024    CREATININE 0.65 (L) 10/28/2024    EGFRIFNONA 66 11/04/2020    EGFRIFAFRI >60 11/17/2021    BCR 26.2 (H) 10/28/2024    CO2 30.7 (H) 10/28/2024    CALCIUM 9.3 10/28/2024    PROTENTOTREF 6.2 11/04/2020    ALBUMIN 3.6 10/26/2024    LABIL2 1.4 11/11/2021    AST 17 10/26/2024    ALT 19 10/26/2024       Microbiology:  10/25 BCx NGTD x 4   10/26 MRSA nares negative  10/27 RVP neg  10/27 SCx NGTD     MRI of the left foot with no evidence of osteomyelitis.  No abscess.  Diffuse soft tissue edema    Assessment    #Left foot contact dermatitis versus cellulitis  #COPD on chronic steroids    MRI without evidence of abscess was osteomyelitis.  Given clinical story presentation is most likely consistent with contact dermatitis in the setting of a new wound dressing rather than cellulitis but will also err on the side of caution and complete a 7-day course of empiric antibiotics.  Continue vancomycin dosing per pharmacy and ceftriaxone 2 g IV every 24 hours in the hospital discharge transition to doxycycline 100 mg p.o. twice daily and  Keflex 500 mg p.o. 4 times daily to complete a 7-day course with an antibiotic stop date October 31.    Patient might benefit from an ambulatory referral to the lymphedema clinic    ID will sign off.  Please do not hesitate to call us with further questions or concerns

## 2024-10-28 NOTE — NURSING NOTE
10/28/24 0946   Wound 10/25/24 Right anterior ankle blisters   Placement Date: 10/25/24   Side: Right  Orientation: anterior  Location: ankle  Primary Wound Type: Venous Ulcer  Type: blisters  Present on Original Admission: Yes   Dressing Appearance dry;intact   Base moist;red   Periwound pink;redness   Edges irregular   Wound Length (cm) 10 cm   Wound Width (cm) 8 cm   Wound Surface Area (cm^2) 80 cm^2   Drainage Characteristics/Odor sanguineous   Drainage Amount small   Care, Wound cleansed with;sterile normal saline   Dressing Care dressing applied;dressing changed  (vaseline gauze, roll gauze)     WOCN consult: patient admitted cellulitis left foot, Large blister unroofed, patient states due to swelling. Legs and feet do not appear with any swelling. Wound care performed.  Patient states he can't put on compression hose himself and has no one to help him. He has called a company regarding an easier wrap, they are going to send him a sample. Currently on IV antibiotics. Recommendations placed in epic.

## 2024-10-28 NOTE — PROGRESS NOTES
[facility]     Mar/chart reviewed  Follow up COPD and chronic bronchitis, hypoxemia  Still some cough, sputum and shortness of breath    Vital Sign Min/Max for last 24 hours  Temp  Min: 97.7 °F (36.5 °C)  Max: 98.8 °F (37.1 °C)   BP  Min: 131/87  Max: 155/83   Pulse  Min: 70  Max: 96   Resp  Min: 16  Max: 20   SpO2  Min: 97 %  Max: 100 %   Flow (L/min) (Oxygen Therapy)  Min: 2  Max: 3   No data recorded     Appears ill, nad, axox3  perrl, eomi, normal sclera  mmm, no jvd, trachea midline, neck supple,  chest decreased bilaterally, no crackles, no wheezes,   rrr,   soft, nt, nd +bs,  no c/c/ +edema  Skin warm, dry no rashes    A/P:  Severe COPD -- He is going to have shortness of breath on exertion given severity of his disease.  Already on laba/lics no percieved benefit from lama.  Might try ohtyvare as an outpatient.  Might benefit from dupixent given eosinohillic phenotype to help reduce ae.  Recommend outpatient pulm rehab as recommended by Dr. Root.  Check echo while here but symptoms seems as expected for severity of his copd.  Hypoxemia -- oxygen as needed to keep sats >88%  3.   Eosinophillia -- check anca panel  4.   Chronic bronchitis. -- needs flutter valve, nebulized hypertonic saline, pulmonary toilet,      He says he doesn't have prescription drug coverage which  may limit some of our options but he does have part B.  May have to explore getting ohtyvare.  Chronic macrolide therapy ought to be affordable as well.  Dupixent might be more complicated will have to explore that through office on follow up.  Will have case management see if they can find options for drug coverage for him.  Will stop hypertonic try mucmyst instead.      No objection to d/c follow up with me in office in about 4 weeks.

## 2024-10-28 NOTE — PROGRESS NOTES
"[facility] Clinical Pharmacy Services: Vancomycin Monitoring Note    Montana Franco is a 74 y.o. male who is on day 4/7 of pharmacy to dose vancomycin for Skin and Soft Tissue.    Previous Vancomycin Dose:  1000 mg IV every  12  hours    Updated Cultures and Sensitivities:     - 10/25 blood cultures 4/4 NGTD (72h)   - 10/26 MRSA screen negative  - 10/27 Resp panel neg  - 10/27 Resp culture- growth, but too early to evaluate    Results from last 7 days   Lab Units 10/28/24  0902 10/26/24  2120   VANCOMYCIN TR mcg/mL 8.40 4.70*     Vitals/Labs  Ht: 170.2 cm (67\"); Wt: 86.2 kg (190 lb 0.6 oz)   Temp Readings from Last 1 Encounters:   10/28/24 99 °F (37.2 °C) (Oral)     Estimated Creatinine Clearance: 106.1 mL/min (A) (by C-G formula based on SCr of 0.65 mg/dL (L)).     Results from last 7 days   Lab Units 10/28/24  0409 10/27/24  0447 10/26/24  0447 10/25/24  1322   CREATININE mg/dL 0.65* 0.72* 0.78 0.98   WBC 10*3/mm3 9.92  --  7.73 10.59     Assessment/Plan    Based on level, will adjust vancomycin regimen to 1250 mg q12h for a new predicted AUC of 489 mg/L.hr (starting this evening).   Next Level Date and Time: Vanc Trough on 10/29 at 2130  We will continue to monitor patient changes and renal function     Thank you for involving pharmacy in this patient's care. Please contact pharmacy with any questions or concerns.       Duke Vela, PharmD  Clinical Pharmacist         "

## 2024-10-28 NOTE — THERAPY TREATMENT NOTE
Patient Name: Montana Franco  : 1950    MRN: [id number]                              Today's Date: 10/28/2024       Admit Date: 10/25/2024    Visit Dx:     ICD-10-CM ICD-9-CM   1. Cellulitis of left foot  L03.116 682.7   2. Open wound of left foot, initial encounter  S91.302A 892.0   3. Other emphysema  J43.8 492.8     Patient Active Problem List   Diagnosis    Dry eyes    Dry mouth    Low back pain    Pinched nerve    Pulmonary emphysema    SOB (shortness of breath)    Hypertension    Cellulitis of left foot    Hyperglycemia     Past Medical History:   Diagnosis Date    Cellulitis     LEFT LEG    COPD (chronic obstructive pulmonary disease)     Hypertension      History reviewed. No pertinent surgical history.   General Information       Row Name 10/28/24 1524          Physical Therapy Time and Intention    Document Type therapy note (daily note)  -     Mode of Treatment individual therapy;physical therapy  -       Row Name 10/28/24 1524          General Information    Patient Profile Reviewed yes  -     Existing Precautions/Restrictions fall  -       Row Name 10/28/24 1524          Cognition    Orientation Status (Cognition) oriented x 4  -       Row Name 10/28/24 1524          Safety Issues/Impairments Affecting Functional Mobility    Impairments Affecting Function (Mobility) balance;endurance/activity tolerance;shortness of breath;strength;pain;range of motion (ROM)  -               User Key  (r) = Recorded By, (t) = Taken By, (c) = Cosigned By      Initials Name Provider Type     Caitlin Durbin PT Physical Therapist                   Mobility       Row Name 10/28/24 1524          Bed Mobility    Bed Mobility supine-sit  -     Supine-Sit [place] (Bed Mobility) verbal cues;standby assist  -     Assistive Device (Bed Mobility) head of bed elevated;bed rails  -       Row Name 10/28/24 1524          Sit-Stand Transfer    Sit-Stand [place] (Transfers) verbal cues;standby assist   -     Assistive Device (Sit-Stand Transfers) walker, front-wheeled  -[facility] Name 10/28/24 1524          Gait/Stairs (Locomotion)    [place] Level (Gait) verbal cues;standby assist;contact guard  -     Assistive Device (Gait) walker, front-wheeled  -     Distance in Feet (Gait) 5  -     Deviations/Abnormal Patterns (Gait) antalgic;niya decreased;gait speed decreased;stride length decreased  -     Bilateral Gait Deviations forward flexed posture  -     Left Sided Gait Deviations weight shift ability decreased;heel strike decreased  -               User Key  (r) = Recorded By, (t) = Taken By, (c) = Cosigned By      Initials Name Provider Type     Caitlin Durbin, PT Physical Therapist                   Obj/Interventions    No documentation.                  Goals/Plan    No documentation.                  Clinical Impression       [facility] Name 10/28/24 1526          Pain    Pre/Posttreatment Pain Comment C/o L foot pain with WBing, unable to rate  -[facility] Name 10/28/24 1526          Plan of Care Review    Plan of Care Reviewed With patient  -     Progress no change  -     Outcome Evaluation Pt seen for PT this PM and tolerated mobility well. Pt transferred to EOB with SBA and stood with SBA, and ambulated 5ft to chair with SBA-CGA. Pt cued to offweight onto rwx, c/o L foot pain with mobility but tolerable for short distance. Pt mobilized on 2L O2, satting 90% just with transfer to chair and notably SOA. Pt with several concerns about his overall medical status, encouraged to discuss with hospitalist. Pt remains unsafe to DC home, recommend DC to SNF, will continue to follow.  -       Row Name 10/28/24 1526          Vital Signs    O2 Delivery Pre Treatment supplemental O2  -     O2 Delivery Intra Treatment supplemental O2  -     O2 Delivery Post Treatment supplemental O2  -[facility] Name 10/28/24 1526          Positioning and Restraints    Pre-Treatment Position in bed  -      Post Treatment Position chair  -     In Chair reclined;call light within reach;encouraged to call for assist;exit alarm on  -               User Key  (r) = Recorded By, (t) = Taken By, (c) = Cosigned By      Initials Name Provider Type     Caitlin Durbin, PT Physical Therapist                   Outcome Measures       Row Name 10/28/24 1534 10/28/24 0914       How much help from another person do you currently need...    Turning from your back to your side while in flat bed without using bedrails? 4  - 4  -OD    Moving from lying on back to sitting on the side of a flat bed without bedrails? 4  - 4  -OD    Moving to and from a bed to a chair (including a wheelchair)? 3  - 3  -OD    Standing up from a chair using your arms (e.g., wheelchair, bedside chair)? 3  - 3  -OD    Climbing 3-5 steps with a railing? 3  - 3  -OD    To walk in hospital room? 3  - 3  -OD    AM-PAC 6 Clicks Score (PT) 20  - 20  -OD    Highest Level of Mobility Goal 6 --> Walk 10 steps or more  - 6 --> Walk 10 steps or more  -OD      Row Name 10/28/24 1534          Functional Assessment    Outcome Measure Options AM-PAC 6 Clicks Basic Mobility (PT)  -               User Key  (r) = Recorded By, (t) = Taken By, (c) = Cosigned By      Initials Name Provider Type     Caitlin Durbin, PT Physical Therapist    Tracy Mackenzie RN Registered Nurse                                 Physical Therapy Education       Title: PT OT SLP Therapies (Done)       Topic: Physical Therapy (Done)       Point: Mobility training (Done)       Learning Progress Summary            Patient Acceptance, E,TB,D, VU,NR by  at 10/28/2024 1535    Acceptance, E,TB,D, VU,NR by  at 10/26/2024 1204                      Point: Home exercise program (Done)       Learning Progress Summary            Patient Acceptance, E,TB,D, VU,NR by  at 10/28/2024 1535    Acceptance, E,TB,D, VU,NR by  at 10/26/2024 1204                      Point: Body mechanics  (Done)       Learning Progress Summary            Patient Acceptance, E,TB,D, VU,NR by  at 10/28/2024 1535    Acceptance, E,TB,D, VU,NR by  at 10/26/2024 1204                      Point: Precautions (Done)       Learning Progress Summary            Patient Acceptance, E,TB,D, VU,NR by  at 10/28/2024 1535    Acceptance, E,TB,D, VU,NR by  at 10/26/2024 1204                                      User Key       Initials Effective Dates Name Provider Type Discipline     04/08/22 -  Caitlin Durbin, PT Physical Therapist PT                  PT Recommendation and Plan  Planned Therapy Interventions (PT): balance training, bed mobility training, gait training, patient/family education, home exercise program, ROM (range of motion), stair training, strengthening, stretching, transfer training  Progress: no change  Outcome Evaluation: Pt seen for PT this PM and tolerated mobility well. Pt transferred to EOB with SBA and stood with SBA, and ambulated 5ft to chair with SBA-CGA. Pt cued to offweight onto rwx, c/o L foot pain with mobility but tolerable for short distance. Pt mobilized on 2L O2, satting 90% just with transfer to chair and notably SOA. Pt with several concerns about his overall medical status, encouraged to discuss with hospitalist. Pt remains unsafe to DC home, recommend DC to SNF, will continue to follow.     Time Calculation:   PT Evaluation Complexity  History, PT Evaluation Complexity: 1-2 personal factors and/or comorbidities  Examination of Body Systems (PT Eval Complexity): total of 3 or more elements  Clinical Presentation (PT Evaluation Complexity): evolving  Clinical Decision Making (PT Evaluation Complexity): moderate complexity  Overall Complexity (PT Evaluation Complexity): moderate complexity     PT Charges       Row Name 10/28/24 1535             Time Calculation    Start Time 1424  -      Stop Time 1443  -      Time Calculation (min) 19 min  -      PT Received On 10/28/24  -       PT - Next Appointment 10/29/24  -         Time Calculation- PT    Total Timed Code Minutes- PT 19 minute(s)  -         Timed Charges    92672 - Gait Training Minutes  8  -      54708 - PT Therapeutic Activity Minutes 11  -         Total Minutes    Timed Charges Total Minutes 19  -       Total Minutes 19  -                User Key  (r) = Recorded By, (t) = Taken By, (c) = Cosigned By      Initials Name Provider Type     Caitlin Durbin, PT Physical Therapist                  Therapy Charges for Today       Code Description Service Date Service Provider Modifiers Qty    [id number]  PT THERAPEUTIC ACT EA 15 MIN 10/28/2024 Caitlin Durbin, PT GP 1            PT G-Codes  Outcome Measure Options: AM-PAC 6 Clicks Basic Mobility (PT)  AM-PAC 6 Clicks Score (PT): 20  PT Discharge Summary  Anticipated Discharge Disposition (PT): skilled nursing facility    Caitlin Durbin PT  10/28/2024

## 2024-10-28 NOTE — PLAN OF CARE
Goal Outcome Evaluation:  Plan of Care Reviewed With: patient        Progress: improving  Outcome Evaluation: A&Ox4. VSS. 2L NC. Scheduled nebs with RT. SOA at times. Cellulitis L foot. Dressing changed today by wound care RN, orders to change q3days. Assist x 1 BRP. Up to chair with PT. Voids per urinal. New IV placed, scheduled IV vancomycin and rocephin. DC to SNF when ready.

## 2024-10-29 PROBLEM — J96.11 CHRONIC RESPIRATORY FAILURE WITH HYPOXIA: Status: ACTIVE | Noted: 2024-10-29

## 2024-10-29 PROBLEM — R07.9 CHEST PAIN: Status: ACTIVE | Noted: 2024-10-29

## 2024-10-29 LAB
ANION GAP SERPL CALCULATED.3IONS-SCNC: 13.9 MMOL/L (ref 5–15)
BUN SERPL-MCNC: 24 MG/DL (ref 8–23)
BUN/CREAT SERPL: 26.4 (ref 7–25)
CALCIUM SPEC-SCNC: 9 MG/DL (ref 8.6–10.5)
CHLORIDE SERPL-SCNC: 100 MMOL/L (ref 98–107)
CO2 SERPL-SCNC: 23.1 MMOL/L (ref 22–29)
CREAT SERPL-MCNC: 0.91 MG/DL (ref 0.76–1.27)
CREAT SERPL-MCNC: 0.98 MG/DL (ref 0.76–1.27)
EGFRCR SERPLBLD CKD-EPI 2021: 81.4 ML/MIN/1.73
EGFRCR SERPLBLD CKD-EPI 2021: 89 ML/MIN/1.73
GEN 5 2HR TROPONIN T REFLEX: 37 NG/L
GLUCOSE BLDC GLUCOMTR-MCNC: 100 MG/DL (ref 70–130)
GLUCOSE BLDC GLUCOMTR-MCNC: 118 MG/DL (ref 70–130)
GLUCOSE BLDC GLUCOMTR-MCNC: 143 MG/DL (ref 70–130)
GLUCOSE BLDC GLUCOMTR-MCNC: 144 MG/DL (ref 70–130)
GLUCOSE BLDC GLUCOMTR-MCNC: 185 MG/DL (ref 70–130)
GLUCOSE SERPL-MCNC: 151 MG/DL (ref 65–99)
MAGNESIUM SERPL-MCNC: 2.2 MG/DL (ref 1.6–2.4)
POTASSIUM SERPL-SCNC: 4 MMOL/L (ref 3.5–5.2)
POTASSIUM SERPL-SCNC: 4.2 MMOL/L (ref 3.5–5.2)
QT INTERVAL: 303 MS
QT INTERVAL: 352 MS
QTC INTERVAL: 411 MS
QTC INTERVAL: 474 MS
SODIUM SERPL-SCNC: 137 MMOL/L (ref 136–145)
TROPONIN T DELTA: 6 NG/L
TROPONIN T SERPL HS-MCNC: 31 NG/L
TROPONIN T SERPL HS-MCNC: 31 NG/L
VANCOMYCIN TROUGH SERPL-MCNC: 14.1 MCG/ML (ref 5–20)

## 2024-10-29 PROCEDURE — 83735 ASSAY OF MAGNESIUM: CPT | Performed by: INTERNAL MEDICINE

## 2024-10-29 PROCEDURE — 94761 N-INVAS EAR/PLS OXIMETRY MLT: CPT

## 2024-10-29 PROCEDURE — 94799 UNLISTED PULMONARY SVC/PX: CPT

## 2024-10-29 PROCEDURE — 25010000002 ENOXAPARIN PER 10 MG: Performed by: INTERNAL MEDICINE

## 2024-10-29 PROCEDURE — 93010 ELECTROCARDIOGRAM REPORT: CPT | Performed by: INTERNAL MEDICINE

## 2024-10-29 PROCEDURE — 82565 ASSAY OF CREATININE: CPT | Performed by: INTERNAL MEDICINE

## 2024-10-29 PROCEDURE — 84132 ASSAY OF SERUM POTASSIUM: CPT | Performed by: INTERNAL MEDICINE

## 2024-10-29 PROCEDURE — 25010000002 DIGOXIN PER 500 MCG

## 2024-10-29 PROCEDURE — 84484 ASSAY OF TROPONIN QUANT: CPT | Performed by: INTERNAL MEDICINE

## 2024-10-29 PROCEDURE — 80048 BASIC METABOLIC PNL TOTAL CA: CPT | Performed by: NURSE PRACTITIONER

## 2024-10-29 PROCEDURE — 99221 1ST HOSP IP/OBS SF/LOW 40: CPT | Performed by: INTERNAL MEDICINE

## 2024-10-29 PROCEDURE — 84484 ASSAY OF TROPONIN QUANT: CPT | Performed by: NURSE PRACTITIONER

## 2024-10-29 PROCEDURE — 25810000003 SODIUM CHLORIDE 0.9 % SOLUTION 250 ML FLEX CONT: Performed by: INTERNAL MEDICINE

## 2024-10-29 PROCEDURE — 93005 ELECTROCARDIOGRAM TRACING: CPT | Performed by: STUDENT IN AN ORGANIZED HEALTH CARE EDUCATION/TRAINING PROGRAM

## 2024-10-29 PROCEDURE — 25010000002 CEFTRIAXONE PER 250 MG: Performed by: INTERNAL MEDICINE

## 2024-10-29 PROCEDURE — 94664 DEMO&/EVAL PT USE INHALER: CPT

## 2024-10-29 PROCEDURE — 94760 N-INVAS EAR/PLS OXIMETRY 1: CPT

## 2024-10-29 PROCEDURE — 82948 REAGENT STRIP/BLOOD GLUCOSE: CPT

## 2024-10-29 PROCEDURE — 80202 ASSAY OF VANCOMYCIN: CPT | Performed by: INTERNAL MEDICINE

## 2024-10-29 PROCEDURE — 25010000002 CEFEPIME PER 500 MG: Performed by: INTERNAL MEDICINE

## 2024-10-29 PROCEDURE — 93005 ELECTROCARDIOGRAM TRACING: CPT | Performed by: INTERNAL MEDICINE

## 2024-10-29 PROCEDURE — 25010000002 VANCOMYCIN HCL 1.25 G RECONSTITUTED SOLUTION 1 EACH VIAL: Performed by: INTERNAL MEDICINE

## 2024-10-29 PROCEDURE — 93005 ELECTROCARDIOGRAM TRACING: CPT | Performed by: NURSE PRACTITIONER

## 2024-10-29 RX ORDER — SODIUM CHLORIDE 0.9 % (FLUSH) 0.9 %
10 SYRINGE (ML) INJECTION EVERY 12 HOURS SCHEDULED
Status: CANCELLED | OUTPATIENT
Start: 2024-10-29

## 2024-10-29 RX ORDER — METOPROLOL SUCCINATE 25 MG/1
25 TABLET, EXTENDED RELEASE ORAL
Status: DISCONTINUED | OUTPATIENT
Start: 2024-10-30 | End: 2024-10-31 | Stop reason: HOSPADM

## 2024-10-29 RX ORDER — SODIUM CHLORIDE 0.9 % (FLUSH) 0.9 %
20 SYRINGE (ML) INJECTION AS NEEDED
Status: CANCELLED | OUTPATIENT
Start: 2024-10-29

## 2024-10-29 RX ORDER — DIGOXIN 0.25 MG/ML
250 INJECTION INTRAMUSCULAR; INTRAVENOUS ONCE
Status: COMPLETED | OUTPATIENT
Start: 2024-10-29 | End: 2024-10-29

## 2024-10-29 RX ORDER — SODIUM CHLORIDE 0.9 % (FLUSH) 0.9 %
10 SYRINGE (ML) INJECTION AS NEEDED
Status: CANCELLED | OUTPATIENT
Start: 2024-10-29

## 2024-10-29 RX ORDER — LOSARTAN POTASSIUM 25 MG/1
25 TABLET ORAL DAILY
Status: DISCONTINUED | OUTPATIENT
Start: 2024-10-30 | End: 2024-10-31 | Stop reason: HOSPADM

## 2024-10-29 RX ADMIN — BUDESONIDE AND FORMOTEROL FUMARATE DIHYDRATE 2 PUFF: 160; 4.5 AEROSOL RESPIRATORY (INHALATION) at 10:51

## 2024-10-29 RX ADMIN — GUAIFENESIN 200 MG: 100 LIQUID ORAL at 21:24

## 2024-10-29 RX ADMIN — ACETYLCYSTEINE 3 ML: 200 SOLUTION ORAL; RESPIRATORY (INHALATION) at 10:55

## 2024-10-29 RX ADMIN — VANCOMYCIN HYDROCHLORIDE 1250 MG: 1.25 INJECTION, POWDER, LYOPHILIZED, FOR SOLUTION INTRAVENOUS at 10:15

## 2024-10-29 RX ADMIN — LOSARTAN POTASSIUM 50 MG: 50 TABLET, FILM COATED ORAL at 10:14

## 2024-10-29 RX ADMIN — CEFTRIAXONE 2000 MG: 2 INJECTION, POWDER, FOR SOLUTION INTRAMUSCULAR; INTRAVENOUS at 00:16

## 2024-10-29 RX ADMIN — CEFEPIME 2000 MG: 2 INJECTION, POWDER, FOR SOLUTION INTRAVENOUS at 13:33

## 2024-10-29 RX ADMIN — SIMETHICONE 80 MG: 80 TABLET, CHEWABLE ORAL at 13:33

## 2024-10-29 RX ADMIN — Medication 10 ML: at 21:24

## 2024-10-29 RX ADMIN — HYDROCODONE BITARTRATE AND ACETAMINOPHEN 1 TABLET: 5; 325 TABLET ORAL at 21:24

## 2024-10-29 RX ADMIN — Medication 5000 UNITS: at 10:14

## 2024-10-29 RX ADMIN — ASPIRIN 81 MG: 81 TABLET, CHEWABLE ORAL at 10:14

## 2024-10-29 RX ADMIN — GUAIFENESIN 200 MG: 100 LIQUID ORAL at 02:57

## 2024-10-29 RX ADMIN — DIGOXIN 250 MCG: 0.25 INJECTION INTRAMUSCULAR; INTRAVENOUS at 22:50

## 2024-10-29 RX ADMIN — METOPROLOL TARTRATE 5 MG: 1 INJECTION, SOLUTION INTRAVENOUS at 19:35

## 2024-10-29 RX ADMIN — ACETAMINOPHEN 325MG 650 MG: 325 TABLET ORAL at 16:28

## 2024-10-29 RX ADMIN — ENOXAPARIN SODIUM 40 MG: 100 INJECTION SUBCUTANEOUS at 10:15

## 2024-10-29 RX ADMIN — VANCOMYCIN HYDROCHLORIDE 1250 MG: 1.25 INJECTION, POWDER, LYOPHILIZED, FOR SOLUTION INTRAVENOUS at 22:58

## 2024-10-29 RX ADMIN — IPRATROPIUM BROMIDE AND ALBUTEROL SULFATE 3 ML: 2.5; .5 SOLUTION RESPIRATORY (INHALATION) at 10:53

## 2024-10-29 RX ADMIN — DEXAMETHASONE 2 MG: 2 TABLET ORAL at 10:15

## 2024-10-29 RX ADMIN — CEFEPIME 2000 MG: 2 INJECTION, POWDER, FOR SOLUTION INTRAVENOUS at 21:23

## 2024-10-29 RX ADMIN — IPRATROPIUM BROMIDE AND ALBUTEROL SULFATE 3 ML: 2.5; .5 SOLUTION RESPIRATORY (INHALATION) at 08:40

## 2024-10-29 RX ADMIN — DEXAMETHASONE 2 MG: 2 TABLET ORAL at 22:58

## 2024-10-29 RX ADMIN — ACETAMINOPHEN 325MG 650 MG: 325 TABLET ORAL at 05:01

## 2024-10-29 RX ADMIN — GUAIFENESIN 200 MG: 100 LIQUID ORAL at 10:15

## 2024-10-29 RX ADMIN — GUAIFENESIN 200 MG: 100 LIQUID ORAL at 17:54

## 2024-10-29 RX ADMIN — SIMETHICONE 80 MG: 80 TABLET, CHEWABLE ORAL at 21:32

## 2024-10-29 RX ADMIN — HYDROCHLOROTHIAZIDE 37.5 MG: 12.5 TABLET ORAL at 10:14

## 2024-10-29 RX ADMIN — Medication 10 ML: at 10:17

## 2024-10-29 RX ADMIN — IPRATROPIUM BROMIDE AND ALBUTEROL SULFATE 3 ML: 2.5; .5 SOLUTION RESPIRATORY (INHALATION) at 15:24

## 2024-10-29 NOTE — PROGRESS NOTES
[facility]     Mar/chart reviewed  Follow up COPD and chronic bronchitis, hypoxemia  Still some cough, sputum and shortness of breath  Tolerated the mycomyst better    Vital Sign Min/Max for last 24 hours  Temp  Min: 97.9 °F (36.6 °C)  Max: 99 °F (37.2 °C)   BP  Min: 124/81  Max: 157/77   Pulse  Min: 78  Max: 145   Resp  Min: 18  Max: 24   SpO2  Min: 93 %  Max: 100 %   Flow (L/min) (Oxygen Therapy)  Min: 1  Max: 2   Weight  Min: 86.2 kg (190 lb)  Max: 86.2 kg (190 lb)     Appears ill, nad, axox3  perrl, eomi, normal sclera  mmm, no jvd, trachea midline, neck supple,  chest decreased bilaterally, no crackles, no wheezes,   rrr,   soft, nt, nd +bs,  no c/c/ +edema  Skin warm, dry no rashes     A/P:  Severe COPD -- He is going to have shortness of breath on exertion given severity of his disease.  Already on laba/lics no percieved benefit from lama.  Might try ohtyvare as an outpatient.  Might benefit from dupixent given eosinohillic phenotype to help reduce ae.  Recommend outpatient pulm rehab as recommended by Dr. Root.  Check echo while here but symptoms seems as expected for severity of his copd.  Hypoxemia -- oxygen as needed to keep sats >88%  3.   Eosinophillia -- check anca panel  4.   Acute on Chronic bronchitis.with pseudomonas -- needs flutter valve, nebulized hypertonic saline, pulmonary toilet,     Will switch antibiotics to iv cefepime to complete 7 days.

## 2024-10-29 NOTE — NURSING NOTE
CTU called this RN at 1613 to inform the patient was in SVT. Patient assessed. Sitting in chair, no lightheadedness, dizziness, SOB or chest pain, although he states there might be a little twinge in the middle of his chest. He cannot feel his heart racing.     EKG obtained and shows SVT. /91 (97).    Call out to cardiology. Waiting for response.     1930: Spoke with Gisela SALCEDO and received orders for IV Lopressor 5mg Q4 hours PRN.

## 2024-10-29 NOTE — NURSING NOTE
Spoke with primary RN regarding PICC consult. Primary RN will speak with treatment team tomorrow to get an updated discharge antibiotic therapy plan. We will continue to follow.

## 2024-10-29 NOTE — DISCHARGE PLACEMENT REQUEST
"Du Franco (74 y.o. Male)       Date of Birth   1950    Social Security Number       Address   [address] [place] [address]    Home Phone   [phone]    MRN   [id number]       Orthodox   Shinto    Marital Status                               Admission Date   10/25/24    Admission Type   Emergency    Admitting Provider   Joshua Novoa MD    Attending Provider   Sinai Collazo MD    Department, Room/Bed   [address], P795/1       Discharge Date       Discharge Disposition       Discharge Destination                                 Attending Provider: Sinai Collazo MD    Allergies: No Known Allergies    Isolation: None   Infection: None   Code Status: CPR    Ht: 170.2 cm (67\")   Wt: 86.2 kg (190 lb)    Admission Cmt: None   Principal Problem: Cellulitis of left foot [L03.116]                   Active Insurance as of 10/25/2024       Primary Coverage       Payor Plan Insurance Group Employer/Plan Group    MEDICARE MEDICARE A & B        Payor Plan Address Payor Plan Phone Number Payor Plan Fax Number Effective Dates    [address] [phone]  10/1/2016 - None Entered    [facility] [id number]         Subscriber Name Subscriber Birth Date Member ID       DU FRANCO 1950 [id number]               Secondary Coverage       Payor Plan Insurance Group Employer/Plan Group    [employer] [place] [employer] [place]        Payor Plan Address Payor Plan Phone Number Payor Plan Fax Number Effective Dates    [address] [employer] [employer] [place] [address] [phone]  3/7/2017 - None Entered    [place] [address]         Subscriber Name Subscriber Birth Date Member ID       DU FRANCO 1950 [id number]                     Emergency Contacts        (Rel.) Home Phone Work Phone Mobile Phone    BE FRANCO (Son) [phone] -- --    SHIMON FRANCO (Son) [phone] -- --    PARKER DO (Friend) [phone] -- [phone]                "

## 2024-10-29 NOTE — CASE MANAGEMENT/SOCIAL WORK
Continued Stay Note  [facility]     Patient Name: Montana Franco  MRN: [id number]  Today's Date: 10/29/2024    Admit Date: 10/25/2024    Plan: [facility]- accepted- follow up with [facility] to confirm bed availability   Discharge Plan       Row Name 10/29/24 1407       Plan    [facility] up with [facility] to confirm bed [facility]    Patient/Family in Agreement with Plan yes    Plan Comments Spoke with patient and his first choice for rehab is [facility]. Spoke with [facility] and they can accept. Beds are tight but he does anticipate they will have a bed tomorrow or Thursday. Patient will need IV Cefepime for 7 days so a PICC line has been ordered. Transfer packet in [facility] and pharmacy updated. Patient requesting w/c van transport @ [place]. [facility] will follow.                   Discharge Codes    No documentation.                 Expected Discharge Date and Time       Expected Discharge Date Expected Discharge Time    Oct 30, 2024               Mague Franklin RN

## 2024-10-29 NOTE — PLAN OF CARE
Goal Outcome Evaluation:  Plan of Care Reviewed With: patient           Outcome Evaluation: Patient remains stable. Alert and oriented. Ambulating with standby assist. Voiding per urinal. IV abx given per order. 2-3L O2. No complaints of pain. Plans to d/c to SNF.

## 2024-10-29 NOTE — PLAN OF CARE
Goal Outcome Evaluation:  Plan of Care Reviewed With: patient        Progress: improving  Outcome Evaluation: VSS, 1 L NC. Standby assist BRP with walker. Cardiology consulted today for episode of SVT. Pt put on remote cardiac monitoring on [employer], in SR. Troponin delta of 6, pt transfered to telemetry. Cardiology plans for stress test tomorrow, NPO at midnight. Cellulitis LLE, dressing with small drainage. next dressing change thursday. IV abx per order, next vanc trough 10/29 at 2130. Cefepime started today by pulmonology. PICC ordered. ID signed off with final abx rec. [facility] accepted, bed available tomorrow or Thursday, pending medically ready for DC.    Medications transferred with pt to telemetry.

## 2024-10-29 NOTE — PROGRESS NOTES
Name: Montana Franco ADMIT: 10/25/2024   : 1950  PCP: Sunshine Ashley APRN    MRN: [id number] LOS: 3 days   AGE/SEX: 74 y.o. male  ROOM: [facility]/     Subjective   Subjective   Resting in bed. Had a rough night. Reports increased back and neck pain as well as trouble sleeping. During his increased pain, had elevating heart rate with chest tightness and right sided jaw pain around 4/5 AM. Symptoms improved with time and Tylenol. States he has seen cardiologist about 6 years ago due swelling in his legs. Reports negative work up and states he has swelling in his legs when on steroids. He denies any prior history of arrhythmia. Breathing is stable at this time. Did have a lot of coughing with Mucomyst yesterday. He is agreeable to rehab. Cardiology to see today.     Objective   Objective   Vital Signs  Temp:  [97.9 °F (36.6 °C)-98.6 °F (37 °C)] 97.9 °F (36.6 °C)  Heart Rate:  [] 80  Resp:  [18-24] 18  BP: (124-157)/(77-93) 124/81  SpO2:  [93 %-100 %] 100 %  on  Flow (L/min) (Oxygen Therapy):  [1-2] 1;   Device (Oxygen Therapy): room air  Body mass index is 29.76 kg/m².    Physical Exam  Vitals and nursing note reviewed.   Constitutional:       Appearance: He is ill-appearing. He is not toxic-appearing.   Cardiovascular:      Rate and Rhythm: Normal rate and regular rhythm.      Pulses: Normal pulses.   Pulmonary:      Effort: Pulmonary effort is normal. No respiratory distress.      Comments: Diminished/rhonchi. On 3L  Abdominal:      General: Bowel sounds are normal. There is no distension.      Palpations: Abdomen is soft.      Tenderness: There is no abdominal tenderness.   Musculoskeletal:         General: Swelling (trace BLE) present. Normal range of motion.   Skin:     General: Skin is warm and dry.      Comments: Left foot wrapped   Neurological:      Mental Status: He is alert and oriented to person, place, and time.      Sensory: No sensory deficit.      Motor: Weakness present.       Coordination: Coordination normal.   Psychiatric:         Mood and Affect: Mood normal.         Behavior: Behavior normal.       Results Review:       I reviewed the patient's new clinical results.  Results from last 7 days   Lab Units 10/28/24  0409 10/26/24  0447 10/25/24  1322   WBC 10*3/mm3 9.92 7.73 10.59   HEMOGLOBIN g/dL 12.3* 12.4* 13.1   PLATELETS 10*3/mm3 200 161 157     Results from last 7 days   Lab Units 10/29/24  0542 10/28/24  0409 10/27/24  0447 10/26/24  0447   SODIUM mmol/L 137 137 135* 141   POTASSIUM mmol/L 4.2 4.5 4.3 3.2*   CHLORIDE mmol/L 100 100 99 101   CO2 mmol/L 23.1 30.7* 27.2 31.9*   BUN mg/dL 24* 17 16 18   CREATININE mg/dL 0.91  0.98 0.65* 0.72* 0.78   GLUCOSE mg/dL 151* 144* 171* 146*   Estimated Creatinine Clearance: 70.4 mL/min (by C-G formula based on SCr of 0.98 mg/dL).  Results from last 7 days   Lab Units 10/26/24  0447 10/25/24  1322   ALBUMIN g/dL 3.6 3.8   BILIRUBIN mg/dL <0.2 0.4   ALK PHOS U/L 62 66   AST (SGOT) U/L 17 18   ALT (SGPT) U/L 19 20     Results from last 7 days   Lab Units 10/29/24  0542 10/28/24  0409 10/27/24  0447 10/26/24  0447 10/25/24  1322   CALCIUM mg/dL 9.0 9.3 9.1 8.6 9.0   ALBUMIN g/dL  --   --   --  3.6 3.8   MAGNESIUM mg/dL  --   --   --  2.3  --      Results from last 7 days   Lab Units 10/25/24  1322   LACTATE mmol/L 0.9     Hemoglobin A1C   Date/Time Value Ref Range Status   10/28/2024 0409 5.80 (H) 4.80 - 5.60 % Final     Glucose   Date/Time Value Ref Range Status   10/29/2024 1134 100 70 - 130 mg/dL Final   10/29/2024 0717 144 (H) 70 - 130 mg/dL Final   10/29/2024 0459 185 (H) 70 - 130 mg/dL Final   10/28/2024 2124 106 70 - 130 mg/dL Final   10/28/2024 1527 130 70 - 130 mg/dL Final   10/28/2024 1031 113 70 - 130 mg/dL Final       acetylcysteine, 3 mL, Nebulization, BID - RT  aspirin, 81 mg, Oral, Daily  budesonide-formoterol, 2 puff, Inhalation, BID - RT  cefepime, 2,000 mg, Intravenous, Q8H  cholecalciferol, 5,000 Units, Oral,  Daily  dexAMETHasone, 2 mg, Oral, Q12H  enoxaparin, 40 mg, Subcutaneous, Daily  guaifenesin, 200 mg, Oral, Q6H  hydroCHLOROthiazide, 37.5 mg, Oral, Daily  insulin lispro, 2-7 Units, Subcutaneous, 4x Daily AC & at Bedtime  ipratropium-albuterol, 3 mL, Nebulization, 4x Daily - RT  [START ON 10/30/2024] losartan, 25 mg, Oral, Daily  [START ON 10/30/2024] metoprolol succinate XL, 25 mg, Oral, Q24H  metoprolol tartrate, 5 mg, Intravenous, Once  sodium chloride, 10 mL, Intravenous, Q12H  vancomycin, 1,250 mg, Intravenous, Q12H      Pharmacy to dose vancomycin,     NPO Diet NPO Type: Strict NPO  Diet: Regular/House; No Caffeine; Fluid Consistency: Thin (IDDSI 0)       Assessment/Plan     Active Hospital Problems    Diagnosis  POA    **Cellulitis of left foot [L03.116]  Yes    Chest pain [R07.9]  No    Chronic respiratory failure with hypoxia [J96.11]  Yes    Hyperglycemia [R73.9]  Yes    Hypertension [I10]  Yes    COPD (chronic obstructive pulmonary disease) [J44.9]  Yes    Low back pain [M54.50]  Yes      Resolved Hospital Problems   No resolved problems to display.       Mr. Franco is a 74 y.o. male with a known history of COPD with chronic oxygen and steroid use, hypertension and chronic venous stasis of lower extremities who presented to the hospital with complaints of worsening pain and redness of his left foot.  Infectious disease was consulted.  Given his severe COPD, pulmonology was asked to see.    Cellulitis of left foot  -MRI without evidence of abscess or osteomyelitis.  -Some concerns for contact dermatitis in the setting of new wound dressing as a source for symptoms rather than cellulitis, but plans to complete a 7-day course of empiric antibiotics.  -ID recommended vancomycin and ceftriaxone while in the hospital with a transition to doxycycline 100 mg p.o. twice daily and Keflex 500 mg p.o. 4 times a day to complete a 7-day course with antibiotic stop date 10/31.  -Recommended for referral to lymphedema  clinic.  -Wound care nurses are following.    COPD  Chronic respiratory failure  -Pulmonology following.  -Felt to have severe COPD and likely to continue with shortness of breath on exertion.  -Currently on LABA/long-term inhaled corticosteroid with no perceived benefit from LAMA.  -Pulmonology considering ohtyvare outpatient as well as Dupixent.   -They recommend outpatient pulmonary rehab as previously recommended by Dr. Root.  -Plans for ANCA panel given eosinophillia.   -On flutter valve, nebulizers.  -Pulmonology switched ceftriaxone to cefepime given pseudomonas on sputum culture (will need 7 days).    HTN  -On HCTZ, losartan and Toprol.  -BP stable.    Chest pain  -With tightness, jaw pain, elevated rates overnight.  -Cardiology consulted.  -Possible stress testing tomorrow. Holter at discharge.    Hyperglycemia  -In setting of steroids.  -A1c 5.8%.    Discussed with patient, nurse and consulting provider, Alessandra Avalos with Cardiology. Also discussed with Dr. Collazo.    VTE Prophylaxis - Lovenox 40 mg SC daily  Code Status - Full code  Disposition - Anticipate discharge to SNF when cleared by cards/pulm. Next couple days.      DENISSE Pizano  [facility]  10/29/24  16:22 EDT

## 2024-10-29 NOTE — PROGRESS NOTES
"[facility]: Vancomycin Monitoring Note    Montana Franco is a 74 y.o. male who is on day 5/7 of pharmacy to dose vancomycin for Skin and Soft Tissue.    Previous Vancomycin Dose:  1250 mg IV every  12  hours    Updated Cultures and Sensitivities:     - 10/25 blood cultures 4/4 NGTD (72h)   - 10/26 MRSA screen negative  - 10/27 Resp panel neg  - 10/27 Resp culture- growth, 4+ pseudomonas    Results from last 7 days   Lab Units 10/28/24  0902 10/26/24  2120   VANCOMYCIN TR mcg/mL 8.40 4.70*     Vitals/Labs  Ht: 170.2 cm (67\"); Wt: 86.2 kg (190 lb)   Temp Readings from Last 1 Encounters:   10/28/24 99 °F (37.2 °C) (Oral)     Estimated Creatinine Clearance: 70.4 mL/min (by C-G formula based on SCr of 0.98 mg/dL).     Results from last 7 days   Lab Units 10/29/24  0542 10/28/24  0409 10/27/24  0447 10/26/24  0447 10/25/24  1322   CREATININE mg/dL 0.91  0.98 0.65* 0.72* 0.78 0.98   WBC 10*3/mm3  --  9.92  --  7.73 10.59     Assessment/Plan    Based on level, will adjust vancomycin regimen to 1250 mg q12h for a new predicted AUC of 532 mg/L.hr   Next Level Date and Time: Vanc Trough on 10/29 at 2130  We will continue to monitor patient changes and renal function   ( watch scr    increasing  although still wnl)    Thank you for involving pharmacy in this patient's care. Please contact pharmacy with any questions or concerns.        Libertad Irby, [facility]    Clinical Pharmacist           "

## 2024-10-29 NOTE — CONSULTS
[facility]  Cardiology Consult Note    Patient Identification:  Name: Montana Franco  Age: 74 y.o.  Sex: male  :  1950  MRN: [id number]             Requesting Physician: DENISSE Davis      Reason for Consultation / Chief Complaint: SVT, asymptomatic    History of Present Illness:     Montana Franco is a 74-year-old male who is current with our service and presented to [facility] due to left foot pain, swelling, redness with history of cellulitis unresponsive to Keflex.  He comes in consult for asymptomatic SVT.  He has a history of COPD, and hypertension.  He was given 1 dose of IV metoprolol tartrate.      Labs on admission reveal WBC 10.59, hemoglobin 13.1, lactic 0.9, glucose 141, bun 29, creatinine 0.98, sodium 138 and potassium 3.5.  X-ray showed small bibasilar likely atelectasis or scarring.  Pulmonary hyperinflation.    10/28/2024 echo revealed EF greater than 70%.  LV diastolic function is consistent with grade 1 impaired relaxation.  Normal RVC size noted.  Hyperdynamic RV systolic function noted.  AV leaflets are mildly to moderately calcified.  Insufficient TR velocity profile to estimate the RV systolic pressure.  Small less than 1 cm pericardial effusion adjacent to the right atrium and right ventricle.  No evidence of cardiac tamponade.      2018 stress test indicated a normal Myocard perfusion study with no evidence of ischemia.    Past Medical History:  Past Medical History:   Diagnosis Date    Cellulitis     LEFT LEG    COPD (chronic obstructive pulmonary disease)     Hypertension      Past Surgical History:  History reviewed. No pertinent surgical history.   Allergies:  No Known Allergies  Home Meds:  Medications Prior to Admission   Medication Sig Dispense Refill Last Dose/Taking    albuterol sulfate  (90 Base) MCG/ACT inhaler Inhale 2 puffs Every 4 (Four) Hours As Needed for Wheezing. 6.7 g 1 10/24/2024 at 10:00 PM    aspirin 81 MG chewable  tablet Chew 1 tablet Daily.   10/25/2024 at  8:00 AM    budesonide-formoterol (SYMBICORT) 160-4.5 MCG/ACT inhaler Inhale 2 puffs 2 (Two) Times a Day.   10/25/2024 at  8:00 AM    dexAMETHasone (DECADRON) 1 MG tablet Take 1 tablet by mouth Daily.   10/25/2024 at  8:00 AM    guaiFENesin (MUCINEX) 600 MG 12 hr tablet Take 2 tablets by mouth 2 (Two) Times a Day. (Patient taking differently: Take 200 mg by mouth Every 4 (Four) Hours As Needed.) 10 tablet 0 10/25/2024 at 10:45 AM    hydroCHLOROthiazide (HYDRODIURIL) 25 MG tablet TAKE 1 TABLET BY MOUTH DAILY (Patient taking differently: Take 1.5 tablets by mouth Daily.) 30 tablet 5 10/25/2024 at  8:00 AM    ipratropium-albuterol (COMBIVENT RESPIMAT)  MCG/ACT inhaler Inhale 1 puff 4 (Four) Times a Day As Needed for Wheezing.   10/25/2024 at  8:00 AM    losartan (COZAAR) 50 MG tablet Take 1 tablet by mouth Daily.   10/25/2024    vitamin D3 125 MCG (5000 UT) capsule capsule Take 1 capsule by mouth Daily.   10/25/2024 at  8:00 AM    azithromycin (ZITHROMAX) 500 MG tablet Take 1 tablet by mouth Daily. 5 tablet 0      Current Meds:   [unfilled]  Social History:   Social History     Tobacco Use    Smoking status: Former     Types: Cigars, Electronic Cigarette, Pipe    Smokeless tobacco: Never    Tobacco comments:     smokes the ecig as well   Substance Use Topics    Alcohol use: No      Family History:  Family History   Problem Relation Age of Onset    Hypertension Mother     Hypertension Father         Review of Systems    Constitutional: No weakness,fatigue, fever, rigors, chills   Eyes: No vision changes, eye pain   ENT/oropharynx: No difficulty swallowing, sore throat, epistaxis, changes in hearing   Cardiovascular: No chest pain, chest tightness, palpitations, paroxysmal nocturnal dyspnea, orthopnea, diaphoresis, dizziness / syncopal episode   Respiratory: No shortness of breath, dyspnea on exertion, cough, wheezing hemoptysis   Gastrointestinal: No abdominal pain, nausea,  "vomiting, diarrhea, bloody stools   Genitourinary: No hematuria, dysuria   Neurological: No headache, tremors, numbness,  one-sided weakness    Musculoskeletal: No cramps, myalgias,  joint pain, joint swelling   Integument: No rash, edema           Constitutional:  /86 (BP Location: Left arm, Patient Position: Lying)   Pulse 77   Temp 98 °F (36.7 °C) (Oral)   Resp 18   Ht 170.2 cm (67\")   Wt 86.2 kg (190 lb)   SpO2 100%   BMI 29.76 kg/m²   General appearance: No acute changes   Neck: Trachea midline; NECK, supple, no thyromegaly or lymphadenopathy   Lungs: Normal size and shape, normal breath sounds, equal distribution of air, no rales and rhonchi   CV: S1-S2 regular, no murmurs, no rub, no gallop   Abdomen: Soft, nontender; no masses , no abnormal abdominal sounds   Extremities: No deformity , normal color , no peripheral edema   Skin: Normal temperature, turgor and texture; no rash, ulcers                Cardiographics  ECG:          Echocardiogram:   10/2024    Interpretation Summary         Left ventricular systolic function is hyperdynamic (EF > 70%).    Left ventricular diastolic function is consistent with (grade I) impaired relaxation.    Normal right ventricular cavity size noted. Hyperdynamic right ventricular systolic function noted.    The aortic valve leaflets are mildly to moderately calcified (aortic sclerosis)    Insufficient TR velocity profile to estimate the right ventricular systolic pressure.    There is a small (<1cm) pericardial effusion adjacent to the right atrium and right ventricle. There is no evidence of cardiac tamponade.    2018  Interpretation Summary       Findings consistent with an equivocal ECG stress test.  Left ventricular ejection fraction is normal (Calculated EF = 64%).  Myocardial perfusion imaging indicates a normal myocardial perfusion study with no evidence of ischemia.  Impressions are consistent with a low risk study.     Asymptomatic for chest pain, " "however c/o SOA, states \"can't breath\"  SpO2 95-97%   Specificity of study reduced secondary to baseline Non-specific ST-T wave abnormalities, however ECG is equivocal suggestive of ischemia.  Baseline 2 mm downslope ST depression Inferior and lateral leads, and remained so throughout study  Ectopy: rare PVC at baseline, but intensity and frequency increased with increased exercise load  Blood pressure response: Hypertensive, Baseline /94, Peak 210/76 and remained 200/80 until 6 minute recovery.  Post exercise /80.  Exercise tolerance is poor.   Sandoval treadmill score is -7. which estimates an annual cardiovascular mortality of 2 % and 5 year survival of  86%. Using the Duke Score there is an intermediate  probability of angiographic coronary disease.   Recommendations:  Imaging  Chest X-ray:     Study Result    Narrative & Impression   XR CHEST 1 VW-     HISTORY: Male who is 74 years-old, recent COVID     TECHNIQUE: Frontal views of the chest     COMPARISON: 6/8/2024     FINDINGS: Heart, mediastinum and pulmonary vasculature are unremarkable.  Small bibasilar likely atelectasis or scarring. No focal pulmonary  consolidation, pleural effusion, or pneumothorax. Pulmonary  hyperinflation suggests COPD, correlate clinically. No acute osseous  process.     IMPRESSION:  Small bibasilar likely atelectasis or scarring. Pulmonary  hyperinflation. Follow-up as clinical indications persist.     This report was finalized on 10/25/2024 4:01 PM by Dr. Hai Martell M.D on Workstation: [id number]     Lab Review       Results from last 7 days   Lab Units 10/26/24  0447   MAGNESIUM mg/dL 2.3     Results from last 7 days   Lab Units 10/29/24  0542 10/28/24  0409   SODIUM mmol/L  --  137   POTASSIUM mmol/L  --  4.5   BUN mg/dL  --  17   CREATININE mg/dL 0.98 0.65*   CALCIUM mg/dL  --  9.3     @[id number]@  Results from last 7 days   Lab Units 10/28/24  0409 10/26/24  0447 10/25/24  1322   WBC 10*3/mm3 9.92 7.73 10.59 "   HEMOGLOBIN g/dL 12.3* 12.4* 13.1   HEMATOCRIT % 37.7 37.8 38.6   PLATELETS 10*3/mm3 200 161 157             Assessment:    Cellulitis of left foot    Low back pain    Pulmonary emphysema    Hypertension    Hyperglycemia  New aflutter: converted to SR      Recommendations / Plan:   Montana Franco is 74-year-old male who is current with our service and presented to [facility] due to left foot pain, swelling, redness with history of cellulitis htn, and copd. He comes in consult with svt and was asymptomatic. At the time of event he was given one dose of  IV metoprolol tartrate. No palps, chest pain or shob currently.    EKG showed a flutter.  Now in sinus rhythm.  Today creatinine 0.98, K+ 3.5, sodium 138 and I will draw a mag. Chest xray showed small bibasilar likely atelectasis or scarring. Pulmonary hyperinflation. Pulm following for copd. Echo on 10/28/24 showed ef >70 %, LV diastolic function is consistent with grade 1 impaired relaxation. Normal RVC. Hyperdynamic RV systolic function noted. AV leaflets are mildly to moderately calcified.  He will undergo a stress test tomorrow, and at discharge he will have a zio placed. I will trend ecg and trop. I will decrease his losartan and add metoprolol succinate 25 daily.       It was explained to the patient that stress testing carries 85% specificity/sensitivity, and does not rule out future cardiac event.  Risks of the procedure were explained to the patient including shortness of breath, induction of myocardial infarction, and dizziness.  Patient is agreeable to proceeding with stress testing.       Alessandra Avalos, APRN  10/29/2024, 09:30 EDT  74-year-old male last seen approximately 6 years ago has been admitted because of cellulitis of the legs has been having episodes of the palpitations appears to be secondary to atrial flutter associated with jaw pain denies any chest pains    Patient will be transferred to a monitored bed will be started on  beta-blockers  MD LASHELL Rick/Transcription:   Dictated utilizing Dragon dictation

## 2024-10-30 ENCOUNTER — APPOINTMENT (OUTPATIENT)
Dept: NUCLEAR MEDICINE | Facility: HOSPITAL | Age: 74
End: 2024-10-30
Payer: MEDICARE

## 2024-10-30 LAB
ALBUMIN SERPL-MCNC: 3.4 G/DL (ref 3.5–5.2)
ALBUMIN/GLOB SERPL: 1.3 G/DL
ALP SERPL-CCNC: 64 U/L (ref 39–117)
ALT SERPL W P-5'-P-CCNC: 41 U/L (ref 1–41)
ANION GAP SERPL CALCULATED.3IONS-SCNC: 8.5 MMOL/L (ref 5–15)
AST SERPL-CCNC: 23 U/L (ref 1–40)
BACTERIA SPEC AEROBE CULT: NORMAL
BACTERIA SPEC RESP CULT: ABNORMAL
BACTERIA SPEC RESP CULT: ABNORMAL
BH CV REST NUCLEAR ISOTOPE DOSE: 9.1 MCI
BH CV STRESS COMMENTS STAGE 1: NORMAL
BH CV STRESS DOSE REGADENOSON STAGE 1: 0.4
BH CV STRESS DURATION MIN STAGE 1: 0
BH CV STRESS DURATION SEC STAGE 1: 10
BH CV STRESS NUCLEAR ISOTOPE DOSE: 27 MCI
BH CV STRESS PROTOCOL 1: NORMAL
BH CV STRESS RECOVERY BP: NORMAL MMHG
BH CV STRESS RECOVERY HR: 88 BPM
BH CV STRESS STAGE 1: 1
BILIRUB SERPL-MCNC: <0.2 MG/DL (ref 0–1.2)
BUN SERPL-MCNC: 30 MG/DL (ref 8–23)
BUN/CREAT SERPL: 37 (ref 7–25)
CALCIUM SPEC-SCNC: 8.9 MG/DL (ref 8.6–10.5)
CHLORIDE SERPL-SCNC: 101 MMOL/L (ref 98–107)
CO2 SERPL-SCNC: 27.5 MMOL/L (ref 22–29)
CREAT SERPL-MCNC: 0.81 MG/DL (ref 0.76–1.27)
DEPRECATED RDW RBC AUTO: 40.1 FL (ref 37–54)
EGFRCR SERPLBLD CKD-EPI 2021: 93.1 ML/MIN/1.73
ERYTHROCYTE [DISTWIDTH] IN BLOOD BY AUTOMATED COUNT: 12 % (ref 12.3–15.4)
GLOBULIN UR ELPH-MCNC: 2.7 GM/DL
GLUCOSE BLDC GLUCOMTR-MCNC: 107 MG/DL (ref 70–130)
GLUCOSE BLDC GLUCOMTR-MCNC: 196 MG/DL (ref 70–130)
GLUCOSE SERPL-MCNC: 142 MG/DL (ref 65–99)
GRAM STN SPEC: ABNORMAL
HCT VFR BLD AUTO: 39.4 % (ref 37.5–51)
HGB BLD-MCNC: 12.9 G/DL (ref 13–17.7)
LV EF NUC BP: 60 %
MAGNESIUM SERPL-MCNC: 2.4 MG/DL (ref 1.6–2.4)
MAXIMAL PREDICTED HEART RATE: 147 BPM
MCH RBC QN AUTO: 29.9 PG (ref 26.6–33)
MCHC RBC AUTO-ENTMCNC: 32.7 G/DL (ref 31.5–35.7)
MCV RBC AUTO: 91.2 FL (ref 79–97)
PERCENT MAX PREDICTED HR: 66.67 %
PLATELET # BLD AUTO: 237 10*3/MM3 (ref 140–450)
PMV BLD AUTO: 9.5 FL (ref 6–12)
POTASSIUM SERPL-SCNC: 4.3 MMOL/L (ref 3.5–5.2)
PROT SERPL-MCNC: 6.1 G/DL (ref 6–8.5)
QT INTERVAL: 303 MS
QT INTERVAL: 366 MS
QTC INTERVAL: 420 MS
QTC INTERVAL: 487 MS
RBC # BLD AUTO: 4.32 10*6/MM3 (ref 4.14–5.8)
SODIUM SERPL-SCNC: 137 MMOL/L (ref 136–145)
STRESS BASELINE BP: NORMAL MMHG
STRESS BASELINE HR: 78 BPM
STRESS PERCENT HR: 78 %
STRESS POST PEAK BP: NORMAL MMHG
STRESS POST PEAK HR: 98 BPM
STRESS TARGET HR: 125 BPM
TROPONIN T SERPL HS-MCNC: 28 NG/L
WBC NRBC COR # BLD AUTO: 9.87 10*3/MM3 (ref 3.4–10.8)

## 2024-10-30 PROCEDURE — 25010000002 CEFEPIME PER 500 MG: Performed by: STUDENT IN AN ORGANIZED HEALTH CARE EDUCATION/TRAINING PROGRAM

## 2024-10-30 PROCEDURE — 94664 DEMO&/EVAL PT USE INHALER: CPT

## 2024-10-30 PROCEDURE — 25810000003 SODIUM CHLORIDE 0.9 % SOLUTION 250 ML FLEX CONT: Performed by: INTERNAL MEDICINE

## 2024-10-30 PROCEDURE — 63710000001 INSULIN LISPRO (HUMAN) PER 5 UNITS: Performed by: NURSE PRACTITIONER

## 2024-10-30 PROCEDURE — 25010000002 ENOXAPARIN PER 10 MG: Performed by: INTERNAL MEDICINE

## 2024-10-30 PROCEDURE — 0 TECHNETIUM SESTAMIBI: Performed by: STUDENT IN AN ORGANIZED HEALTH CARE EDUCATION/TRAINING PROGRAM

## 2024-10-30 PROCEDURE — 94799 UNLISTED PULMONARY SVC/PX: CPT

## 2024-10-30 PROCEDURE — 93005 ELECTROCARDIOGRAM TRACING: CPT | Performed by: NURSE PRACTITIONER

## 2024-10-30 PROCEDURE — 99232 SBSQ HOSP IP/OBS MODERATE 35: CPT

## 2024-10-30 PROCEDURE — 99232 SBSQ HOSP IP/OBS MODERATE 35: CPT | Performed by: INTERNAL MEDICINE

## 2024-10-30 PROCEDURE — 94761 N-INVAS EAR/PLS OXIMETRY MLT: CPT

## 2024-10-30 PROCEDURE — 25010000002 REGADENOSON 0.4 MG/5ML SOLUTION: Performed by: STUDENT IN AN ORGANIZED HEALTH CARE EDUCATION/TRAINING PROGRAM

## 2024-10-30 PROCEDURE — 85027 COMPLETE CBC AUTOMATED: CPT | Performed by: NURSE PRACTITIONER

## 2024-10-30 PROCEDURE — 78452 HT MUSCLE IMAGE SPECT MULT: CPT

## 2024-10-30 PROCEDURE — 84484 ASSAY OF TROPONIN QUANT: CPT | Performed by: NURSE PRACTITIONER

## 2024-10-30 PROCEDURE — 93010 ELECTROCARDIOGRAM REPORT: CPT | Performed by: INTERNAL MEDICINE

## 2024-10-30 PROCEDURE — 25010000002 CEFEPIME PER 500 MG: Performed by: INTERNAL MEDICINE

## 2024-10-30 PROCEDURE — 80053 COMPREHEN METABOLIC PANEL: CPT | Performed by: NURSE PRACTITIONER

## 2024-10-30 PROCEDURE — 82948 REAGENT STRIP/BLOOD GLUCOSE: CPT

## 2024-10-30 PROCEDURE — 93018 CV STRESS TEST I&R ONLY: CPT | Performed by: INTERNAL MEDICINE

## 2024-10-30 PROCEDURE — A9500 TC99M SESTAMIBI: HCPCS | Performed by: STUDENT IN AN ORGANIZED HEALTH CARE EDUCATION/TRAINING PROGRAM

## 2024-10-30 PROCEDURE — 83735 ASSAY OF MAGNESIUM: CPT | Performed by: NURSE PRACTITIONER

## 2024-10-30 PROCEDURE — 93017 CV STRESS TEST TRACING ONLY: CPT

## 2024-10-30 PROCEDURE — C1751 CATH, INF, PER/CENT/MIDLINE: HCPCS

## 2024-10-30 PROCEDURE — 78452 HT MUSCLE IMAGE SPECT MULT: CPT | Performed by: INTERNAL MEDICINE

## 2024-10-30 PROCEDURE — 25010000002 VANCOMYCIN HCL 1.25 G RECONSTITUTED SOLUTION 1 EACH VIAL: Performed by: INTERNAL MEDICINE

## 2024-10-30 RX ORDER — REGADENOSON 0.08 MG/ML
0.4 INJECTION, SOLUTION INTRAVENOUS
Status: COMPLETED | OUTPATIENT
Start: 2024-10-30 | End: 2024-10-30

## 2024-10-30 RX ORDER — SODIUM CHLORIDE 0.9 % (FLUSH) 0.9 %
10 SYRINGE (ML) INJECTION AS NEEDED
Status: DISCONTINUED | OUTPATIENT
Start: 2024-10-30 | End: 2024-10-31 | Stop reason: HOSPADM

## 2024-10-30 RX ORDER — SODIUM CHLORIDE 0.9 % (FLUSH) 0.9 %
10 SYRINGE (ML) INJECTION EVERY 12 HOURS SCHEDULED
Status: DISCONTINUED | OUTPATIENT
Start: 2024-10-30 | End: 2024-10-31 | Stop reason: HOSPADM

## 2024-10-30 RX ADMIN — CEFEPIME 2000 MG: 2 INJECTION, POWDER, FOR SOLUTION INTRAVENOUS at 04:58

## 2024-10-30 RX ADMIN — ACETYLCYSTEINE 3 ML: 200 SOLUTION ORAL; RESPIRATORY (INHALATION) at 14:30

## 2024-10-30 RX ADMIN — METOPROLOL SUCCINATE 25 MG: 25 TABLET, EXTENDED RELEASE ORAL at 10:38

## 2024-10-30 RX ADMIN — BUDESONIDE AND FORMOTEROL FUMARATE DIHYDRATE 2 PUFF: 160; 4.5 AEROSOL RESPIRATORY (INHALATION) at 19:56

## 2024-10-30 RX ADMIN — Medication 10 ML: at 20:20

## 2024-10-30 RX ADMIN — CEFEPIME 2000 MG: 2 INJECTION, POWDER, FOR SOLUTION INTRAVENOUS at 14:33

## 2024-10-30 RX ADMIN — DEXAMETHASONE 2 MG: 2 TABLET ORAL at 20:18

## 2024-10-30 RX ADMIN — ACETYLCYSTEINE 3 ML: 200 SOLUTION ORAL; RESPIRATORY (INHALATION) at 19:55

## 2024-10-30 RX ADMIN — GUAIFENESIN 200 MG: 100 LIQUID ORAL at 20:18

## 2024-10-30 RX ADMIN — SIMETHICONE 80 MG: 80 TABLET, CHEWABLE ORAL at 14:34

## 2024-10-30 RX ADMIN — GUAIFENESIN 200 MG: 100 LIQUID ORAL at 08:24

## 2024-10-30 RX ADMIN — VANCOMYCIN HYDROCHLORIDE 1250 MG: 1.25 INJECTION, POWDER, LYOPHILIZED, FOR SOLUTION INTRAVENOUS at 22:05

## 2024-10-30 RX ADMIN — ASPIRIN 81 MG: 81 TABLET, CHEWABLE ORAL at 08:25

## 2024-10-30 RX ADMIN — REGADENOSON 0.4 MG: 0.08 INJECTION, SOLUTION INTRAVENOUS at 12:30

## 2024-10-30 RX ADMIN — GUAIFENESIN 200 MG: 100 LIQUID ORAL at 14:34

## 2024-10-30 RX ADMIN — ENOXAPARIN SODIUM 40 MG: 100 INJECTION SUBCUTANEOUS at 08:24

## 2024-10-30 RX ADMIN — Medication 5000 UNITS: at 09:16

## 2024-10-30 RX ADMIN — INSULIN LISPRO 2 UNITS: 100 INJECTION, SOLUTION INTRAVENOUS; SUBCUTANEOUS at 17:14

## 2024-10-30 RX ADMIN — TECHNETIUM TC 99M SESTAMIBI 1 DOSE: 1 INJECTION INTRAVENOUS at 12:30

## 2024-10-30 RX ADMIN — IPRATROPIUM BROMIDE AND ALBUTEROL SULFATE 3 ML: 2.5; .5 SOLUTION RESPIRATORY (INHALATION) at 14:27

## 2024-10-30 RX ADMIN — DEXAMETHASONE 2 MG: 2 TABLET ORAL at 08:25

## 2024-10-30 RX ADMIN — CEFEPIME 2000 MG: 2 INJECTION, POWDER, FOR SOLUTION INTRAVENOUS at 20:18

## 2024-10-30 RX ADMIN — VANCOMYCIN HYDROCHLORIDE 1250 MG: 1.25 INJECTION, POWDER, LYOPHILIZED, FOR SOLUTION INTRAVENOUS at 10:09

## 2024-10-30 RX ADMIN — TECHNETIUM TC 99M SESTAMIBI 1 DOSE: 1 INJECTION INTRAVENOUS at 10:55

## 2024-10-30 RX ADMIN — IPRATROPIUM BROMIDE AND ALBUTEROL SULFATE 3 ML: 2.5; .5 SOLUTION RESPIRATORY (INHALATION) at 19:56

## 2024-10-30 RX ADMIN — Medication 10 ML: at 08:25

## 2024-10-30 RX ADMIN — ACETAMINOPHEN 325MG 650 MG: 325 TABLET ORAL at 14:34

## 2024-10-30 RX ADMIN — LOSARTAN POTASSIUM 25 MG: 25 TABLET, FILM COATED ORAL at 08:24

## 2024-10-30 RX ADMIN — HYDROCHLOROTHIAZIDE 37.5 MG: 12.5 TABLET ORAL at 08:24

## 2024-10-30 NOTE — PROGRESS NOTES
[facility]      Mar/chart reviewed  Follow up COPD and chronic bronchitis, hypoxemia  Still some cough, sputum and shortness of breath -feels significantly improved however  Had some SVT, npo for stress test today    Vital Sign Min/Max for last 24 hours  Temp  Min: 97.9 °F (36.6 °C)  Max: 100 °F (37.8 °C)   BP  Min: 99/82  Max: 158/86   Pulse  Min: 72  Max: 163   Resp  Min: 18  Max: 18   SpO2  Min: 95 %  Max: 100 %   Flow (L/min) (Oxygen Therapy)  Min: 1  Max: 1   No data recorded     Appears ill, nad, axox3  perrl, eomi, normal sclera  mmm, no jvd, trachea midline, neck supple,  chest decreased bilaterally, no crackles, no wheezes,   rrr,   soft, nt, nd +bs,  no c/c/ +edema  Skin warm, dry no rashes    Labs; 10/30: reviewed:  Glucose 142  Bun 30  Cr 0.81  Bicarb 27  Wbc 9.8  Hgb 12.9  Plts 237  Trop 28    Echo:   Ef >70; grade I diastolic, hyperdynamic rv    A/P:  Severe COPD -- He is going to have shortness of breath on exertion given severity of his disease.  Already on laba/lics no percieved benefit from lama.  Might try ohtyvare as an outpatient.  Might benefit from dupixent given eosinohillic phenotype to help reduce ae.  Chronic macrolide might be an option as well. Limited by lack of prescription drug coverage  Hypoxemia -- oxygen as needed to keep sats >88%  3.   Eosinophillia -- check anca panel  4.   Acute on Chronic bronchitis.with pseudomonas -- needs flutter valve, nebulized hypertonic saline, pulmonary toilet, IV cefepime (says he had tendinopathy due to levaquin in the past)    No objection to d/c will see prn while here.  My office will arrange follow up.

## 2024-10-30 NOTE — NURSING NOTE
Spoke with Dr. Prieto and Dr. Durbin regarding antibiotics for discharge.  Both agreed on cefepime.  Dr. Prieto okay with mid line instead of PICC line.  Will notify IV team.

## 2024-10-30 NOTE — PLAN OF CARE
Goal Outcome Evaluation:  Plan of Care Reviewed With: patient        Progress: improving     Patient alerted.  Remains on O2.  Up in chair most of day.  Stress test completed.  Midline place for antibiotics post discharge.  Foot wound dressing changed.  Will continue to monitor.

## 2024-10-30 NOTE — SIGNIFICANT NOTE
10/30/24 1150   OTHER   Discipline physical therapist   Rehab Time/Intention   Session Not Performed patient unavailable for treatment;other (see comments)  (Pt off floor for stress test; f/u 10/31 if appropriate. Nurse aware)   Recommendation   PT - Next Appointment 10/31/24

## 2024-10-30 NOTE — SIGNIFICANT NOTE
"   10/30/24 1648   Midline Catheter - Single Lumen 10/30/24 Left Basilic   Placement date: If unknown, DO NOT use \"Add Comment\" note/Placement time: If unknown, DO NOT use \"Add Comment\" note: 10/30/24 1646   Hand Hygiene Completed: Yes  Site Prep: Chlorhexidine  All 5 Sterile Barriers Used (Gloves, Gown, Cap, Mask, Large Gallo...   Site Assessment Clean;Dry;Intact   Line Status Blood return noted;Capped;Saline locked;Flushed   Length jazzmine (cm) 12 cm   Extremity Circumference (cm) 27 cm   Dressing Type Border Dressing;Securing device;Antimicrobial dressing/disc   Dressing Status Clean;Dry;Intact   Dressing Intervention New dressing   Indication/Daily Review of Necessity other (see comments)  (IV abx until 11/5)     Sharp Count Correct 3 needles, 2 guidewires, and 1 scalpel  "

## 2024-10-30 NOTE — PROGRESS NOTES
[facility]  Cardiology Progress Note    Patient Identification:  Name: Montana Franco  Age: 74 y.o.  Sex: male  :  1950  MRN: [id number]                 Follow Up / Chief Complaint: Follow up on svt    Interval History: went into atrial flutter yesterday evening, converted back to SR around midnight, he denies any chest pain or shortness of breath. Stress test today       Objective:    Past Medical History:  Past Medical History:   Diagnosis Date    Cellulitis     LEFT LEG    COPD (chronic obstructive pulmonary disease)     Hypertension      Past Surgical History:  History reviewed. No pertinent surgical history.     Social History:   Social History     Tobacco Use    Smoking status: Former     Types: Cigars, Electronic Cigarette, Pipe    Smokeless tobacco: Never    Tobacco comments:     smokes the ecig as well   Substance Use Topics    Alcohol use: No      Family History:  Family History   Problem Relation Age of Onset    Hypertension Mother     Hypertension Father           Allergies:  No Known Allergies  Scheduled Meds:  acetylcysteine, 3 mL, BID - RT  aspirin, 81 mg, Daily  budesonide-formoterol, 2 puff, BID - RT  cefepime, 2,000 mg, Q8H  cholecalciferol, 5,000 Units, Daily  dexAMETHasone, 2 mg, Q12H  enoxaparin, 40 mg, Daily  guaifenesin, 200 mg, Q6H  hydroCHLOROthiazide, 37.5 mg, Daily  insulin lispro, 2-7 Units, 4x Daily AC & at Bedtime  ipratropium-albuterol, 3 mL, 4x Daily - RT  losartan, 25 mg, Daily  metoprolol succinate XL, 25 mg, Q24H  metoprolol tartrate, 5 mg, Once  sodium chloride, 10 mL, Q12H  vancomycin, 1,250 mg, Q12H            INTAKE AND OUTPUT:    Intake/Output Summary (Last 24 hours) at 10/30/2024 1339  Last data filed at 10/30/2024 1040  Gross per 24 hour   Intake 490 ml   Output 1750 ml   Net -1260 ml       ROS  Constitutional: Awake and alert, no fever. No nosebleeds  Abdomen           no abdominal pain   Cardiac              no chest pain  Pulmonary          no  "shortness of breath      /86 (BP Location: Left arm, Patient Position: Lying)   Pulse 77   Temp 98 °F (36.7 °C) (Oral)   Resp 18   Ht 170.2 cm (67\")   Wt 86.2 kg (190 lb)   SpO2 100%   BMI 29.76 kg/m²   General appearance: No acute changes   Neck: Trachea midline; NECK, supple, no thyromegaly or lymphadenopathy   Lungs: Normal size and shape, normal breath sounds, equal distribution of air, no rales or rhonchi   CV: S1-S2 regular, no murmurs, no rub, no gallop   Abdomen: Soft, nontender; no masses , no abnormal abdominal sounds   Extremities: No deformity , normal color , no peripheral edema   Skin: Normal temperature, turgor and texture; no rash, ulcers      Cardiographics  ECG:           Echocardiogram:   Interpretation Summary         Left ventricular systolic function is hyperdynamic (EF > 70%).    Left ventricular diastolic function is consistent with (grade I) impaired relaxation.    Normal right ventricular cavity size noted. Hyperdynamic right ventricular systolic function noted.    The aortic valve leaflets are mildly to moderately calcified (aortic sclerosis)    Insufficient TR velocity profile to estimate the right ventricular systolic pressure.    There is a small (<1cm) pericardial effusion adjacent to the right atrium and right ventricle. There is no evidence of cardiac tamponade.     Lab Review   Results from last 7 days   Lab Units 10/30/24  0520 10/29/24  1837 10/29/24  1308   HSTROP T ng/L 28* 31* 37*     Results from last 7 days   Lab Units 10/30/24  0520   MAGNESIUM mg/dL 2.4     Results from last 7 days   Lab Units 10/30/24  0520   SODIUM mmol/L 137   POTASSIUM mmol/L 4.3   BUN mg/dL 30*   CREATININE mg/dL 0.81   CALCIUM mg/dL 8.9     @LABRCNTIPbnp@  Results from last 7 days   Lab Units 10/30/24  0520 10/28/24  0409 10/26/24  0447   WBC 10*3/mm3 9.87 9.92 7.73   HEMOGLOBIN g/dL 12.9* 12.3* 12.4*   HEMATOCRIT % 39.4 37.7 37.8   PLATELETS 10*3/mm3 237 200 161     CHADS-VASc Risk " "Assessment               2 Total Score    1 Hypertension    1 Age 65-74    Criteria that do not apply:    CHF    Age >/= 75    DM    PRIOR STROKE/TIA/THROMBO    Vascular Disease    Sex: Female                    Assessment:  Cellulitis left foot  Low back pain  Pulmonary emphysema  Hypertension  Hyperglycemia  New atrial flutter      Plan:  Back into aflutter yesterday evening converted to SR overnight after being given lopressor and digoxin IV. Toprol 25mg started today, continue.  He denies any chest pain currently. He reports in the past he would get an episode every 6months or so of aching pain in his jaw and some chest heaviness, he will check his o2 on pulse ox and will note that his hr will be in the 160s, and he would take a tylenol and go to bed and it would be back to normal in the morning.   Stress test today, further recommendations pending read  Cynthia at discharge.     )10/30/2024  Gisela Pham, APRN  74-year-old male with episodes of atrial flutter now in normal sinus rhythm will be continued on beta-blocker had a long discussion with the patient patient does not want anticoagulation at this point  Leonel Power MD      [facility] Dragon/Transcription:   \"Dictated utilizing Dragon dictation\".     "

## 2024-10-30 NOTE — PROGRESS NOTES
Name: Montana Franco ADMIT: 10/25/2024   : 1950  PCP: Sunshine Ashley APRN    MRN: [id number] LOS: 4 days   AGE/SEX: 74 y.o. male  ROOM: [facility]     Subjective   Subjective   His breathing treatments and been on hold since last night due to stress test this morning, he feels little bit more short of breath today.  Denies any chest pain.    Objective   Objective   Vital Signs  Temp:  [97.9 °F (36.6 °C)-100 °F (37.8 °C)] 98.3 °F (36.8 °C)  Heart Rate:  [] 72  Resp:  [18] 18  BP: ()/(67-91) 158/86  SpO2:  [95 %-100 %] 100 %  on  Flow (L/min) (Oxygen Therapy):  [1] 1;   Device (Oxygen Therapy): nasal cannula  Body mass index is 29.76 kg/m².  Physical Exam  Constitutional:       Appearance: He is ill-appearing.   Pulmonary:      Effort: Pulmonary effort is normal. No respiratory distress.      Breath sounds: No stridor. No wheezing or rales.   Abdominal:      General: Abdomen is flat.   Skin:     Coloration: Skin is not pale.   Neurological:      Mental Status: He is alert and oriented to person, place, and time.         Results Review     I reviewed the patient's new clinical results.  Results from last 7 days   Lab Units 10/30/24  0520 10/28/24  0409 10/26/24  0447 10/25/24  1322   WBC 10*3/mm3 9.87 9.92 7.73 10.59   HEMOGLOBIN g/dL 12.9* 12.3* 12.4* 13.1   PLATELETS 10*3/mm3 237 200 161 157     Results from last 7 days   Lab Units 10/30/24  0520 10/29/24  1837 10/29/24  0542 10/28/24  0409 10/27/24  0447   SODIUM mmol/L 137  --  137 137 135*   POTASSIUM mmol/L 4.3 4.0 4.2 4.5 4.3   CHLORIDE mmol/L 101  --  100 100 99   CO2 mmol/L 27.5  --  23.1 30.7* 27.2   BUN mg/dL 30*  --  24* 17 16   CREATININE mg/dL 0.81  --  0.91  0.98 0.65* 0.72*   GLUCOSE mg/dL 142*  --  151* 144* 171*   EGFR mL/min/1.73 93.1  --  89.0  81.4 99.5 96.5     Results from last 7 days   Lab Units 10/30/24  0520 10/26/24  0447 10/25/24  1322   ALBUMIN g/dL 3.4* 3.6 3.8   BILIRUBIN mg/dL <0.2 <0.2 0.4   ALK PHOS U/L 64 62 66    AST (SGOT) U/L 23 17 18   ALT (SGPT) U/L 41 19 20     Results from last 7 days   Lab Units 10/30/24  0520 10/29/24  1837 10/29/24  0542 10/28/24  0409 10/27/24  0447 10/26/24  0447 10/25/24  1322   CALCIUM mg/dL 8.9  --  9.0 9.3 9.1 8.6 9.0   ALBUMIN g/dL 3.4*  --   --   --   --  3.6 3.8   MAGNESIUM mg/dL 2.4 2.2  --   --   --  2.3  --      Results from last 7 days   Lab Units 10/25/24  1322   LACTATE mmol/L 0.9     Hemoglobin A1C   Date/Time Value Ref Range Status   10/28/2024 0409 5.80 (H) 4.80 - 5.60 % Final     Glucose   Date/Time Value Ref Range Status   10/29/2024 2123 118 70 - 130 mg/dL Final   10/29/2024 1623 143 (H) 70 - 130 mg/dL Final   10/29/2024 1134 100 70 - 130 mg/dL Final   10/29/2024 0717 144 (H) 70 - 130 mg/dL Final   10/29/2024 0459 185 (H) 70 - 130 mg/dL Final   10/28/2024 2124 106 70 - 130 mg/dL Final   10/28/2024 1527 130 70 - 130 mg/dL Final       No radiology results for the last day  Scheduled Medications  acetylcysteine, 3 mL, Nebulization, BID - RT  aspirin, 81 mg, Oral, Daily  budesonide-formoterol, 2 puff, Inhalation, BID - RT  cefepime, 2,000 mg, Intravenous, Q8H  cholecalciferol, 5,000 Units, Oral, Daily  dexAMETHasone, 2 mg, Oral, Q12H  enoxaparin, 40 mg, Subcutaneous, Daily  guaifenesin, 200 mg, Oral, Q6H  hydroCHLOROthiazide, 37.5 mg, Oral, Daily  insulin lispro, 2-7 Units, Subcutaneous, 4x Daily AC & at Bedtime  ipratropium-albuterol, 3 mL, Nebulization, 4x Daily - RT  losartan, 25 mg, Oral, Daily  metoprolol succinate XL, 25 mg, Oral, Q24H  metoprolol tartrate, 5 mg, Intravenous, Once  sodium chloride, 10 mL, Intravenous, Q12H  vancomycin, 1,250 mg, Intravenous, Q12H    Infusions  Pharmacy to dose vancomycin,     Diet  NPO Diet NPO Type: Strict NPO       Assessment/Plan     Active Hospital Problems    Diagnosis  POA    **Cellulitis of left foot [L03.116]  Yes    Chest pain [R07.9]  No    Chronic respiratory failure with hypoxia [J96.11]  Yes    Hyperglycemia [R73.9]  Yes     Hypertension [I10]  Yes    COPD (chronic obstructive pulmonary disease) [J44.9]  Yes    Low back pain [M54.50]  Yes      Resolved Hospital Problems   No resolved problems to display.       74 y.o. male admitted with Cellulitis of left foot.      10/30/24  Stress test pending.    Cellulitis of left foot  -MRI without evidence of abscess or osteomyelitis.  -Some concerns for contact dermatitis in the setting of new wound dressing as a source for symptoms rather than cellulitis, but plans to complete a 7-day course of empiric antibiotics.  -ID recommended vancomycin and ceftriaxone while in the hospital with a transition to doxycycline 100 mg p.o. twice daily and Keflex 500 mg p.o. 4 times a day to complete a 7-day course with antibiotic stop date 10/31.  -Recommended for referral to lymphedema clinic.  -Wound care nurses are following.     COPD  Chronic respiratory failure  -Pulmonology following.  -Felt to have severe COPD and likely to continue with shortness of breath on exertion.  -Currently on LABA/long-term inhaled corticosteroid with no perceived benefit from LAMA.  -Pulmonology considering ohtyvare outpatient as well as Dupixent.   -They recommend outpatient pulmonary rehab as previously recommended by Dr. Root.  -Plans for ANCA panel given eosinophillia.   -On flutter valve, nebulizers.  -Pulmonology switched ceftriaxone to cefepime given pseudomonas on sputum culture (will need 7 days)- midline to be placed     HTN  -On HCTZ, losartan and Toprol.     Chest pain  SVT  -With tightness, jaw pain, elevated rates overnight.  -Cardiology consulted.  -stress test 10/30  -plan for zio at HI     Hyperglycemia  -In setting of steroids.  -A1c 5.8%.    Flow (L/min) (Oxygen Therapy):  [1] 1    DVT prophylaxis: Lovenox  Discussed with patient and nursing staff.  Anticipated discharge SNF, when cleared by consultants            Frank Banerjee MD  [facility]  10/30/24  13:29 EDT

## 2024-10-30 NOTE — PLAN OF CARE
Goal Outcome Evaluation:  Plan of Care Reviewed With: patient        Progress: improving  Outcome Evaluation: Patient is alert and oriented x 4. Initially on SVT in the monitor, HR sustaining 140-150s, received IV Metoprolol x 1 and IV Digoxin x 1. Denies chest pain. Converted to Sinus rhythm at 0037H. PRN Norco given for left foot pain. Vitals stable, on 1L NC. NPO post midnight for stress test today.                               Refill approved as requested.

## 2024-10-31 ENCOUNTER — APPOINTMENT (OUTPATIENT)
Dept: CARDIOLOGY | Facility: HOSPITAL | Age: 74
End: 2024-10-31
Payer: MEDICARE

## 2024-10-31 VITALS
BODY MASS INDEX: 29.82 KG/M2 | WEIGHT: 190 LBS | TEMPERATURE: 98.2 F | HEIGHT: 67 IN | HEART RATE: 71 BPM | SYSTOLIC BLOOD PRESSURE: 132 MMHG | DIASTOLIC BLOOD PRESSURE: 74 MMHG | RESPIRATION RATE: 18 BRPM | OXYGEN SATURATION: 100 %

## 2024-10-31 PROBLEM — R07.9 CHEST PAIN: Status: RESOLVED | Noted: 2024-10-29 | Resolved: 2024-10-31

## 2024-10-31 LAB
C-ANCA TITR SER IF: NORMAL TITER
CREAT SERPL-MCNC: 0.89 MG/DL (ref 0.76–1.27)
EGFRCR SERPLBLD CKD-EPI 2021: 90.5 ML/MIN/1.73
GLUCOSE BLDC GLUCOMTR-MCNC: 118 MG/DL (ref 70–130)
GLUCOSE BLDC GLUCOMTR-MCNC: 151 MG/DL (ref 70–130)
MYELOPEROXIDASE AB SER IA-ACNC: <0.2 UNITS (ref 0–0.9)
P-ANCA ATYPICAL TITR SER IF: NORMAL TITER
P-ANCA TITR SER IF: NORMAL TITER
PROTEINASE3 AB SER IA-ACNC: <0.2 UNITS (ref 0–0.9)

## 2024-10-31 PROCEDURE — 82565 ASSAY OF CREATININE: CPT | Performed by: INTERNAL MEDICINE

## 2024-10-31 PROCEDURE — 93246 EXT ECG>7D<15D RECORDING: CPT

## 2024-10-31 PROCEDURE — 82948 REAGENT STRIP/BLOOD GLUCOSE: CPT

## 2024-10-31 PROCEDURE — 94799 UNLISTED PULMONARY SVC/PX: CPT

## 2024-10-31 PROCEDURE — 25010000002 VANCOMYCIN HCL 1.25 G RECONSTITUTED SOLUTION 1 EACH VIAL: Performed by: INTERNAL MEDICINE

## 2024-10-31 PROCEDURE — 25810000003 SODIUM CHLORIDE 0.9 % SOLUTION 250 ML FLEX CONT: Performed by: INTERNAL MEDICINE

## 2024-10-31 PROCEDURE — 25010000002 CEFEPIME PER 500 MG: Performed by: STUDENT IN AN ORGANIZED HEALTH CARE EDUCATION/TRAINING PROGRAM

## 2024-10-31 PROCEDURE — 94760 N-INVAS EAR/PLS OXIMETRY 1: CPT

## 2024-10-31 PROCEDURE — 25010000002 ENOXAPARIN PER 10 MG: Performed by: INTERNAL MEDICINE

## 2024-10-31 PROCEDURE — 94664 DEMO&/EVAL PT USE INHALER: CPT

## 2024-10-31 PROCEDURE — 63710000001 INSULIN LISPRO (HUMAN) PER 5 UNITS: Performed by: NURSE PRACTITIONER

## 2024-10-31 PROCEDURE — 99232 SBSQ HOSP IP/OBS MODERATE 35: CPT | Performed by: NURSE PRACTITIONER

## 2024-10-31 RX ORDER — TAMSULOSIN HYDROCHLORIDE 0.4 MG/1
1 CAPSULE ORAL DAILY
Qty: 30 CAPSULE | Refills: 1 | Status: SHIPPED | OUTPATIENT
Start: 2024-10-31

## 2024-10-31 RX ORDER — METOPROLOL SUCCINATE 25 MG/1
25 TABLET, EXTENDED RELEASE ORAL
Qty: 30 TABLET | Refills: 1 | Status: SHIPPED | OUTPATIENT
Start: 2024-11-01

## 2024-10-31 RX ADMIN — Medication 10 ML: at 08:25

## 2024-10-31 RX ADMIN — IPRATROPIUM BROMIDE AND ALBUTEROL SULFATE 3 ML: 2.5; .5 SOLUTION RESPIRATORY (INHALATION) at 06:54

## 2024-10-31 RX ADMIN — ASPIRIN 81 MG: 81 TABLET, CHEWABLE ORAL at 08:16

## 2024-10-31 RX ADMIN — CEFEPIME 2000 MG: 2 INJECTION, POWDER, FOR SOLUTION INTRAVENOUS at 11:23

## 2024-10-31 RX ADMIN — IPRATROPIUM BROMIDE AND ALBUTEROL SULFATE 3 ML: 2.5; .5 SOLUTION RESPIRATORY (INHALATION) at 10:45

## 2024-10-31 RX ADMIN — VANCOMYCIN HYDROCHLORIDE 1250 MG: 1.25 INJECTION, POWDER, LYOPHILIZED, FOR SOLUTION INTRAVENOUS at 08:17

## 2024-10-31 RX ADMIN — CEFEPIME 2000 MG: 2 INJECTION, POWDER, FOR SOLUTION INTRAVENOUS at 04:10

## 2024-10-31 RX ADMIN — HYDROCHLOROTHIAZIDE 37.5 MG: 12.5 TABLET ORAL at 08:16

## 2024-10-31 RX ADMIN — ACETYLCYSTEINE 3 ML: 200 SOLUTION ORAL; RESPIRATORY (INHALATION) at 06:56

## 2024-10-31 RX ADMIN — GUAIFENESIN 200 MG: 100 LIQUID ORAL at 08:16

## 2024-10-31 RX ADMIN — DEXAMETHASONE 2 MG: 2 TABLET ORAL at 08:16

## 2024-10-31 RX ADMIN — METOPROLOL SUCCINATE 25 MG: 25 TABLET, EXTENDED RELEASE ORAL at 08:16

## 2024-10-31 RX ADMIN — ENOXAPARIN SODIUM 40 MG: 100 INJECTION SUBCUTANEOUS at 08:16

## 2024-10-31 RX ADMIN — INSULIN LISPRO 2 UNITS: 100 INJECTION, SOLUTION INTRAVENOUS; SUBCUTANEOUS at 08:17

## 2024-10-31 RX ADMIN — Medication 5000 UNITS: at 08:16

## 2024-10-31 RX ADMIN — BUDESONIDE AND FORMOTEROL FUMARATE DIHYDRATE 2 PUFF: 160; 4.5 AEROSOL RESPIRATORY (INHALATION) at 07:01

## 2024-10-31 RX ADMIN — GUAIFENESIN 200 MG: 100 LIQUID ORAL at 04:10

## 2024-10-31 RX ADMIN — LOSARTAN POTASSIUM 25 MG: 25 TABLET, FILM COATED ORAL at 08:16

## 2024-10-31 NOTE — PLAN OF CARE
Goal Outcome Evaluation:  Plan of Care Reviewed With: patient        Progress: improving  Outcome Evaluation: Patient is alert and oriented x 4. Vitals stable, on 1L NC. Sinus rhythm. No complain of pain. Midline was placed for IV antibiotics until 11/5. Plan for discharge to [facility] once medically cleared.

## 2024-10-31 NOTE — PROGRESS NOTES
[facility]  Cardiology Progress Note    Patient Identification:  Name: Montana Franco  Age: 74 y.o.  Sex: male  :  1950  MRN: [id number]                 Follow Up / Chief Complaint: Follow up on svt    Interval History: Atrial flutter 10/29/24 evening. Remains in SR. No cp or shob.      Objective:    Past Medical History:  Past Medical History:   Diagnosis Date    Cellulitis     LEFT LEG    COPD (chronic obstructive pulmonary disease)     Hypertension      Past Surgical History:  History reviewed. No pertinent surgical history.     Social History:   Social History     Tobacco Use    Smoking status: Former     Types: Cigars, Electronic Cigarette, Pipe    Smokeless tobacco: Never    Tobacco comments:     smokes the ecig as well   Substance Use Topics    Alcohol use: No      Family History:  Family History   Problem Relation Age of Onset    Hypertension Mother     Hypertension Father           Allergies:  No Known Allergies  Scheduled Meds:  acetylcysteine, 3 mL, BID - RT  aspirin, 81 mg, Daily  budesonide-formoterol, 2 puff, BID - RT  cefepime, 2,000 mg, Q8H  cholecalciferol, 5,000 Units, Daily  dexAMETHasone, 2 mg, Q12H  enoxaparin, 40 mg, Daily  guaifenesin, 200 mg, Q6H  hydroCHLOROthiazide, 37.5 mg, Daily  insulin lispro, 2-7 Units, 4x Daily AC & at Bedtime  ipratropium-albuterol, 3 mL, 4x Daily - RT  losartan, 25 mg, Daily  metoprolol succinate XL, 25 mg, Q24H  metoprolol tartrate, 5 mg, Once  sodium chloride, 10 mL, Q12H  sodium chloride, 10 mL, Q12H  vancomycin, 1,250 mg, Q12H            INTAKE AND OUTPUT:    Intake/Output Summary (Last 24 hours) at 10/31/2024 0946  Last data filed at 10/31/2024 0816  Gross per 24 hour   Intake 480 ml   Output 1300 ml   Net -820 ml       ROS  Constitutional: awake and alert, no fever. No nosebleeds  Abdomen           no abdominal pain   Cardiac              no chest pain  Pulmonary          no shortness of breath      /74 (BP Location: Right arm,  "Patient Position: Sitting)   Pulse 74   Temp 98.2 °F (36.8 °C) (Oral)   Resp 16   Ht 170.2 cm (67\")   Wt 86.2 kg (190 lb)   SpO2 100%   BMI 29.76 kg/m²     Physical Exam:  General:  Appears in no acute distress, resting in bed  Eyes: eom normal no conjunctival drainage  HEENT:  No JVD. Thyroid not visibly enlarged. No mucosal pallor or cyanosis  Respiratory: Respirations regular and unlabored at rest. BBS with good air entry in all fields. No crackles, rubs or wheezes auscultated  Cardiovascular: S1S2 Regular rate and rhythm. No murmur, rub or gallop. No carotid bruits. DP/PT pulses     . No pretibial pitting edema  Gastrointestinal: Abdomen soft, flat, non tender. Bowel sounds present. No hepatosplenomegaly. No ascites  Skin:   Skin warm and dry to touch. No rashes    Neuro: AAO x3 CN II-XII grossly intact  Psych: Mood and affect normal, pleasant and cooperative          I reviewed the patient's new clinical results, and personally reviewed and interpreted the patient's ECG and telemetry data from the last 24 hours      Cardiographics  ECG:           Echocardiogram:   Interpretation Summary         Left ventricular systolic function is hyperdynamic (EF > 70%).    Left ventricular diastolic function is consistent with (grade I) impaired relaxation.    Normal right ventricular cavity size noted. Hyperdynamic right ventricular systolic function noted.    The aortic valve leaflets are mildly to moderately calcified (aortic sclerosis)    Insufficient TR velocity profile to estimate the right ventricular systolic pressure.    There is a small (<1cm) pericardial effusion adjacent to the right atrium and right ventricle. There is no evidence of cardiac tamponade.     Lab Review   Results from last 7 days   Lab Units 10/30/24  0520 10/29/24  1837 10/29/24  1308   HSTROP T ng/L 28* 31* 37*     Results from last 7 days   Lab Units 10/30/24  0520   MAGNESIUM mg/dL 2.4     Results from last 7 days   Lab Units " "10/31/24  0412 10/30/24  0520   SODIUM mmol/L  --  137   POTASSIUM mmol/L  --  4.3   BUN mg/dL  --  30*   CREATININE mg/dL 0.89 0.81   CALCIUM mg/dL  --  8.9     @LABRCNTIPbnp@  Results from last 7 days   Lab Units 10/30/24  0520 10/28/24  0409 10/26/24  0447   WBC 10*3/mm3 9.87 9.92 7.73   HEMOGLOBIN g/dL 12.9* 12.3* 12.4*   HEMATOCRIT % 39.4 37.7 37.8   PLATELETS 10*3/mm3 237 200 161     CHADS-VASc Risk Assessment               2 Total Score    1 Hypertension    1 Age 65-74    Criteria that do not apply:    CHF    Age >/= 75    DM    PRIOR STROKE/TIA/THROMBO    Vascular Disease    Sex: Female                    Assessment:  -Cellulitis left foot  -Low back pain  -Pulmonary emphysema  -Hypertension  -Hyperglycemia  -New atrial flutter:Back into aflutter 10/30/24 and converted to SR overnight after being given lopressor and digoxin IV. Toprol 25mg started today, continue. He declines a/c at this point.      Plan:  Blood pressure and HR stable.  Aflutter 10/30/24 now in SR. He declines a/c at this point  Stress test negative. Cynthia at discharge.     He will follow-up with Dr. Power on December 5 at 11:15 am.    )10/31/2024  DENISSE Lutz/Transcription:   \"Dictated utilizing Dragon dictation\".     "

## 2024-10-31 NOTE — CASE MANAGEMENT/SOCIAL WORK
Case Management Discharge Note      Final Note: [facility] SNF via [facility] at 12:30. No additional CCP needs.         Selected Continued Care - Admitted Since 10/25/2024       Destination Coordination complete.      Service Provider Services Address Phone Fax Patient Preferred    [facility] Skilled Nursing [address] [place] [phone] --              Durable Medical Equipment    No services have been selected for the patient.                Dialysis/Infusion    No services have been selected for the patient.                Home Medical Care    No services have been selected for the patient.                Therapy    No services have been selected for the patient.                Community Resources    No services have been selected for the patient.                Community & DME    No services have been selected for the patient.                         Final Discharge Disposition Code: 03 - skilled nursing facility (SNF)

## 2024-10-31 NOTE — CASE MANAGEMENT/SOCIAL WORK
Continued Stay Note  [facility]     Patient Name: Montana Franco  MRN: [id number]  Today's Date: 10/31/2024    Admit Date: 10/25/2024    Plan: [facility] SN[facility] via [employer] [facility] at 12:30   Discharge Plan       Row Name 10/31/24 1157       Plan    Plan [facility] SN[facility] via [employer] [facility] at 12:30    Patient/Family in Agreement with Plan yes    Plan Comments Discussed with MD and RN at the nurse's station; MD plans to DC. Montez/Signature notified and bed available anytime today. [employer] transport arranged for 12:30pm. MD, RN and Montez/Leon notified of transport time. Packet given to RN. Nader CRAWFORD/CCP    Final Discharge Disposition Code 03 - skilled nursing facility (SNF)    Final Note [facility] SN[facility] via [employer] [facility] at 12:30. No additional CCP needs.                   Discharge Codes    No documentation.                 Expected Discharge Date and Time       Expected Discharge Date Expected Discharge Time    Oct 31, 2024               Mariely Joseph, RN

## 2024-10-31 NOTE — DISCHARGE SUMMARY
"    Patient Name: Montana Franco  : 1950  MRN: [id number]    Date of Admission: 10/25/2024  Date of Discharge:  10/31/2024  Primary Care Physician: Sunshine Ashley APRN      Chief Complaint:   Wound Check      Discharge Diagnoses     Active Hospital Problems    Diagnosis  POA    **Cellulitis of left foot [L03.116]  Yes    Chronic respiratory failure with hypoxia [J96.11]  Yes    Hyperglycemia [R73.9]  Yes    Hypertension [I10]  Yes    COPD (chronic obstructive pulmonary disease) [J44.9]  Yes    Low back pain [M54.50]  Yes      Resolved Hospital Problems    Diagnosis Date Resolved POA    Chest pain [R07.9] 10/31/2024 No        Admitting HPI     \"Source of information patient himself, review of records, and review of the pictures of his left foot blister since last week.  Patient is a 74-year-old male with past medical history remarkable for COPD with chronic hypoxia on oxygen supplementation and chronic steroid, history of hypertension, chronic venous stasis of lower extremities and has been using Unna boot type wrapping and dressing to his left leg.  According to him last week after leaving the dressing on his left leg for more than a week he removed the dressing because his foot was getting more swollen and noticed a blister on the top of his left foot without any redness around it.  Patient was eventually seen by wound care provider on 10/21/2024 and his blister was ruptured and large amount of clear fluid was removed.  Patient subsequently had dressing in place on the left foot and leg.  By Wednesday patient started complaining of significant pain and discomfort on his left foot for which he called his wound care provider and was prescribed Keflex.  Patient was recommended to be seen in the clinic.  According the patient by that time he was noticing some increasing redness on the top of the foot.  Despite taking antibiotics his pain and discomfort continued to get worse and today he went for the follow-up " "at the wound care clinic and was recommended inpatient admission for IV antibiotics and further evaluation.  Patient denies any other symptoms such as fever chills decrease in appetite or change in the pattern of his breathing in terms of cough or wheezing.  Blood cultures are drawn and patient has been started on ceftriaxone and vancomycin.  Patient has been seen at the infectious disease clinic at [facility] infectious disease associated in October 2023 for ruptured blister on the lateral aspect of left calf with secondary infection which grew Enterococcus and patient was treated with oral Zyvox.  By the time patient was seen by infectious disease providers on 10/30/2024 and was noted to be free of infection during that visit.  Possibility of venous stasis/stasis dermatitis was raised as explanation for his episodic erythema.  Patient was recommended to have vascular surgery follow-up and podiatry service referral.  Patient was not considered to be a candidate for any antibiotic therapy because of healing of the calf wound and resolution of infection.\"    Hospital Course     Pt admitted for left foot wound.  Seen in consult with ID who recommended course of antibiotics.  In addition he was seen by pulmonology given severe COPD and cardiology for chest pain.  He underwent stress test on 10/30/2024 which was a low risk study and no further ischemic workup was recommended.  He did go into SVT/atrial flutter and was started on metoprolol with control of his symptoms.  Cardiology discussed AC with the patient, however he declined at this time.  He will continue on cefepime per pulmonology recommendations to complete 7-day course.  At this point he is symptomatically improved.  Evaluated by PT with recommendations for SNF, and he is stable for discharge.    Discharge Plan     Cellulitis of left foot  -MRI without evidence of abscess or osteomyelitis.  -ID recommended vancomycin and ceftriaxone while in the " [facility], completed 7d  -Recommended for referral to lymphedema clinic  -cont wound care     COPD  Chronic respiratory failure  -Pulmonology eval'd  -Ball to have severe COPD and likely to continue with shortness of breath on exertion.  -Currently on LABA/long-term inhaled corticosteroid with no perceived benefit from LAMA.  -Pulmonology considering ohtyvare outpatient as well as Dupixent.   -They recommend outpatient pulmonary rehab as previously recommended by Dr. Root.  -Pulmonology switched ceftriaxone to cefepime given pseudomonas on sputum culture (will need 7 days)- via midline    HTN  -On HCTZ, losartan and Toprol     Chest pain  SVT  -With tightness, jaw pain, elevated rates  -Cardiology consulted  -stress test 10/30 low risk; no further ischemic eval  -started on metoprolol; pt declined AC  -Zio patch placed at dc    Day of Discharge     Physical Exam:  Temp:  [97.7 °F (36.5 °C)-98.2 °F (36.8 °C)] 98.2 °F (36.8 °C)  Heart Rate:  [70-82] 71  Resp:  [16-18] 18  BP: (124-145)/(68-86) 132/74  Body mass index is 29.76 kg/m².  Physical Exam  Constitutional:       Appearance: He is ill-appearing.   Pulmonary:      Effort: Pulmonary effort is normal. No respiratory distress.      Breath sounds: No stridor. No wheezing or rales.   Abdominal:      General: Abdomen is flat.   Skin:     Coloration: Skin is not pale.   Neurological:      Mental Status: He is alert and oriented to person, place, and time.     Consultants     Consult Orders (all) (From admission, onward)       Start     Ordered    10/30/24 1206  IV TEAM - Consult for Midline Placement (IV Team to Determine Number of Lumens)  Once        Provider:  (Not yet assigned)    10/30/24 1205    10/29/24 1356  Inpatient IV Team Consult PICC 1 Lumen  Once,   Status:  Canceled        Provider:  (Not yet assigned)    10/29/24 1355    10/29/24 0702  Inpatient Cardiology Consult  IN AM        Specialty:  Cardiology  Provider:  Clay Gale MD    10/29/24 0606     10/28/24 1328  Inpatient Case Management  Consult  Once        Provider:  (Not yet assigned)    10/28/24 1327    10/27/24 1230  Inpatient Pulmonology Consult  Once        Specialty:  Pulmonary Disease  Provider:  Ezra Root MD    10/27/24 1231    10/26/24 0808  Inpatient Infectious Diseases Consult  Once        Specialty:  Infectious Diseases  Provider:  Diane Durbin MD    10/26/24 0807    10/26/24 0805  Inpatient Infectious Diseases Consult  Once,   Status:  Canceled        Specialty:  Infectious Diseases  Provider:  Diane Durbin MD    10/26/24 0806    10/25/24 1435  [facility] (on-call MD unless specified) Details  Once        Specialty:  Hospitalist  Provider:  Joshua Novoa MD    10/25/24 1435                  Procedures     * Surgery not found *      Imaging Results (All)       Procedure Component Value Units Date/Time    MRI Foot Left With & Without Contrast [[id number]] Collected: 10/27/24 1215     Updated: 10/27/24 1229    Narrative:      MRI FOOT LEFT W WO CONTRAST-     Date of Exam: 10/26/2024 1:46 PM     Indication: Cellulitis with possible deep abscess. History of melanoma  in 2002.     Comparison: Radiographs 10/25/2024.     Technique: Multiplanar, multisequence MR imaging of the midfoot and  forefoot was performed before and following the intravenous  administration of 18 mL Multihance.     FINDINGS:  Motion artifact mild limits evaluation.     SOFT TISSUES: Diffuse soft tissue edema, trace soft tissue fluid, and  amorphous soft tissue enhancement, greatest at the dorsal forefoot and  partially included midfoot. Superficial and intermediate T1/high T2  signal collection at the dorsal medial midfoot and proximal forefoot,  measuring 3.5 cm x 2.5 cm x 0.5 cm, probable skin blistering or  overlying artifact. Diffuse skin surface irregularity throughout the  dorsum of the forefoot and partially imaged midfoot. Large region of  relatively decreased enhancement of the plantar soft tissues  of the  distal forefoot, which could represent soft tissue necrosis or  hypervascularity. No well-formed or drainable fluid collection is seen  to suggest abscess. No cystic or solid soft tissue mass. No nodular or  masslike enhancement.  The intermetatarsal spaces are unremarkable.  The  partially imaged plantar fascia is unremarkable.     BONES: Normal bone marrow signal.  No fracture.  No concerning bone  marrow lesion or marrow replacing process.     JOINTS: No significant joint effusion.   The articular cartilage is  well-maintained intact.     LIGAMENTS: The Lisfranc ligament complex is intact.  The MTP collateral  ligaments are intact.     MUSCLES AND TENDONS: The flexor and extensor tendons are intact. The  plantar plates are grossly intact.  No significant muscular fatty  atrophy or myositis.       Impression:         1. Diffuse soft tissue edema, trace soft tissue fluid, and amorphous  soft tissue enhancement, greatest at the dorsal forefoot and partially  included midfoot, with a suspected skin blister at the dorsal medial  midfoot and proximal forefoot and skin surface irregularity throughout  the dorsum of the forefoot and partially imaged midfoot. No well-formed  or drainable fluid collection to suggest abscess.  2. Large region of relatively decreased tissue enhancement at the  plantar distal forefoot, suggesting soft tissue necrosis or  hypovascularity.  3. No MRI evidence of osteomyelitis.              This report was finalized on 10/27/2024 12:26 PM by Rigo Lyons MD on  Workstation: [id number]       XR Chest 1 View [[id number]] Collected: 10/25/24 1556     Updated: 10/25/24 1604    Narrative:      XR CHEST 1 VW-     HISTORY: Male who is 74 years-old, recent COVID     TECHNIQUE: Frontal views of the chest     COMPARISON: 6/8/2024     FINDINGS: Heart, mediastinum and pulmonary vasculature are unremarkable.  Small bibasilar likely atelectasis or scarring. No focal pulmonary  consolidation, pleural  effusion, or pneumothorax. Pulmonary  hyperinflation suggests COPD, correlate clinically. No acute osseous  process.       Impression:      Small bibasilar likely atelectasis or scarring. Pulmonary  hyperinflation. Follow-up as clinical indications persist.     This report was finalized on 10/25/2024 4:01 PM by Dr. Hai Martell M.D on Workstation: [id number]       XR Foot 3+ View Left [[id number]] Collected: 10/25/24 1358     Updated: 10/25/24 1409    Narrative:      XR FOOT 3+ VW LEFT-     INDICATIONS: Infection.     TECHNIQUE: 3 VIEWS OF THE LEFT FOOT     COMPARISON: None available     FINDINGS:     Soft tissue swelling at the dorsal aspect of the foot may be evidence of  inflammation, infection, injury, correlate clinically. No acute  fracture, erosion, or dislocation is identified. If there is clinical  suspicion for radiographically occult osteomyelitis, MRI or bone scan  could be considered.       Impression:         As described.           This report was finalized on 10/25/2024 2:06 PM by Dr. Hai Martell M.D on Workstation: [id number]               Results for orders placed during the hospital encounter of 10/25/24    Adult Transthoracic Echo Complete W/ Cont if Necessary Per Protocol    Interpretation Summary    Left ventricular systolic function is hyperdynamic (EF > 70%).    Left ventricular diastolic function is consistent with (grade I) impaired relaxation.    Normal right ventricular cavity size noted. Hyperdynamic right ventricular systolic function noted.    The aortic valve leaflets are mildly to moderately calcified (aortic sclerosis)    Insufficient TR velocity profile to estimate the right ventricular systolic pressure.    There is a small (<1cm) pericardial effusion adjacent to the right atrium and right ventricle. There is no evidence of cardiac tamponade.    Pertinent Labs     Results from last 7 days   Lab Units 10/30/24  0520 10/28/24  0409 10/26/24  0447 10/25/24  1322   WBC  "10*3/mm3 9.87 9.92 7.73 10.59   HEMOGLOBIN g/dL 12.9* 12.3* 12.4* 13.1   PLATELETS 10*3/mm3 237 200 161 157     Results from last 7 days   Lab Units 10/31/24  0412 10/30/24  0520 10/29/24  1837 10/29/24  0542 10/28/24  0409 10/27/24  0447   SODIUM mmol/L  --  137  --  137 137 135*   POTASSIUM mmol/L  --  4.3 4.0 4.2 4.5 4.3   CHLORIDE mmol/L  --  101  --  100 100 99   CO2 mmol/L  --  27.5  --  23.1 30.7* 27.2   BUN mg/dL  --  30*  --  24* 17 16   CREATININE mg/dL 0.89 0.81  --  0.91  0.98 0.65* 0.72*   GLUCOSE mg/dL  --  142*  --  151* 144* 171*   EGFR mL/min/1.73 90.5 93.1  --  89.0  81.4 99.5 96.5     Results from last 7 days   Lab Units 10/30/24  0520 10/26/24  0447 10/25/24  1322   ALBUMIN g/dL 3.4* 3.6 3.8   BILIRUBIN mg/dL <0.2 <0.2 0.4   ALK PHOS U/L 64 62 66   AST (SGOT) U/L 23 17 18   ALT (SGPT) U/L 41 19 20     Results from last 7 days   Lab Units 10/30/24  0520 10/29/24  1837 10/29/24  0542 10/28/24  0409 10/27/24  0447 10/26/24  0447 10/25/24  1322   CALCIUM mg/dL 8.9  --  9.0 9.3 9.1 8.6 9.0   ALBUMIN g/dL 3.4*  --   --   --   --  3.6 3.8   MAGNESIUM mg/dL 2.4 2.2  --   --   --  2.3  --        Results from last 7 days   Lab Units 10/30/24  0520 10/29/24  1837 10/29/24  1308 10/29/24  1042   HSTROP T ng/L 28* 31* 37* 31*           Invalid input(s): \"LDLCALC\"  Results from last 7 days   Lab Units 10/27/24  1900 10/26/24  1201 10/25/24  2039 10/25/24  2027 10/25/24  1357 10/25/24  1353   BLOODCX   --   --  No growth at 5 days No growth at 5 days No growth at 5 days No growth at 5 days   RESPCX  Heavy growth (4+) Pseudomonas aeruginosa*  Moderate growth (3+) Normal Respiratory Chana  --   --   --   --   --    MRSAPCR   --  No MRSA Detected  --   --   --   --      Results from last 7 days   Lab Units 10/27/24  1738   COVID19  Not Detected       Test Results Pending at Discharge     Pending Labs       Order Current Status    ANCA Panel In process            Discharge Details        Discharge Medications    "     New Medications        Instructions Start Date   cefepime 2000 mg IVPB in 100 mL NS (MBP)   2,000 mg, Intravenous, Every 8 Hours      metoprolol succinate XL 25 MG 24 hr tablet  Commonly known as: TOPROL-XL   25 mg, Oral, Every 24 Hours Scheduled   Start Date: November 1, 2024     tamsulosin 0.4 MG capsule 24 hr capsule  Commonly known as: FLOMAX   0.4 mg, Oral, Daily             Changes to Medications        Instructions Start Date   guaiFENesin 600 MG 12 hr tablet  Commonly known as: MUCINEX  What changed:   how much to take  when to take this  reasons to take this   1,200 mg, Oral, 2 Times Daily      hydroCHLOROthiazide 25 MG tablet  What changed: how much to take   TAKE 1 TABLET BY MOUTH DAILY             Continue These Medications        Instructions Start Date   albuterol sulfate  (90 Base) MCG/ACT inhaler  Commonly known as: PROVENTIL HFA;VENTOLIN HFA;PROAIR HFA   2 puffs, Inhalation, Every 4 Hours PRN      aspirin 81 MG chewable tablet   81 mg, Daily      azithromycin 500 MG tablet  Commonly known as: ZITHROMAX   500 mg, Oral, Daily      budesonide-formoterol 160-4.5 MCG/ACT inhaler  Commonly known as: SYMBICORT   2 puffs, 2 Times Daily - RT      dexAMETHasone 1 MG tablet  Commonly known as: DECADRON   1 mg, Daily      ipratropium-albuterol  MCG/ACT inhaler  Commonly known as: COMBIVENT RESPIMAT   1 puff, 4 Times Daily PRN      losartan 50 MG tablet  Commonly known as: COZAAR   50 mg, Daily      vitamin D3 125 MCG (5000 UT) capsule capsule   5,000 Units, Daily               No Known Allergies    Discharge Disposition:  Skilled Nursing Facility (DC - External)      Discharge Diet:  Diet Order   Procedures    Diet: Cardiac; Healthy Heart (2-3 Na+); Fluid Consistency: Thin (IDDSI 0)       Discharge Activity:   Activity Instructions       Activity as Tolerated              CODE STATUS:    Code Status and Medical Interventions: CPR (Attempt to Resuscitate); Full Support   Ordered at: 10/25/24  1848     Code Status (Patient has no pulse and is not breathing):    CPR (Attempt to Resuscitate)     Medical Interventions (Patient has pulse or is breathing):    Full Support       Future Appointments   Date Time Provider Department Center   12/5/2024 11:15 AM Leonel Power MD [facility]       Additional Instructions for the Follow-ups that You Need to Schedule       Ambulatory Referral to Lymphedema Clinic   As directed      Service Requested: Either PT or OT               Contact information for follow-up providers       Sunshine Ashley APRN .    Specialty: Nurse Practitioner  Contact information:  [address]  [facility] [id number] [phone]               [facility] .    Specialty: Occupational Therapy  Contact information:  [address] [facility] [address]  [phone]             Leonel Power MD Follow up on 12/5/2024.    Specialty: Cardiology  Contact information:  [address] [place] [address]  [phone]                       Contact information for after-discharge care       Destination       [facility] .    Service: Skilled Nursing  Contact information:  [address] [facility] [id number] [phone]                                   Additional Instructions for the Follow-ups that You Need to Schedule       Ambulatory Referral to Lymphedema Clinic   As directed      Service Requested: Either PT or OT            Time Spent on Discharge:  Greater than 30 minutes spent on discharge management including final examination, discussion of hospital stay and patient education, preparation of records, medication reconciliation, follow up planning      Frank Banerjee MD  [facility]  10/31/24  11:46 EDT

## 2024-11-14 RX ORDER — ACETYLCYSTEINE 200 MG/ML
4 SOLUTION ORAL; RESPIRATORY (INHALATION) 2 TIMES DAILY
Qty: 200 ML | Refills: 3 | Status: SHIPPED | OUTPATIENT
Start: 2024-11-14 | End: 2025-02-12

## 2024-11-21 LAB
CV ZIO BASELINE AVG BPM: 79 BPM
CV ZIO BASELINE BPM HIGH: 200 BPM
CV ZIO BASELINE BPM LOW: 55 BPM
CV ZIO DEVICE ANALYSIS TIME: NORMAL
CV ZIO ECT SVE COUNT: 3797 EPISODES
CV ZIO ECT SVE CPLT COUNT: 78 EPISODES
CV ZIO ECT SVE CPLT FREQ: NORMAL
CV ZIO ECT SVE FREQ: NORMAL
CV ZIO ECT SVE TPLT COUNT: 26 EPISODES
CV ZIO ECT SVE TPLT FREQ: NORMAL
CV ZIO ECT VE COUNT: 847 EPISODES
CV ZIO ECT VE CPLT COUNT: 18 EPISODES
CV ZIO ECT VE CPLT FREQ: NORMAL
CV ZIO ECT VE FREQ: NORMAL
CV ZIO ECT VE TPLT COUNT: 3 EPISODES
CV ZIO ECT VE TPLT FREQ: NORMAL
CV ZIO ECTOPIC SVE COUPLET RAW PERCENT: 0.01 %
CV ZIO ECTOPIC SVE ISOLATED PERCENT: 0.25 %
CV ZIO ECTOPIC SVE TRIPLET RAW PERCENT: 0.01 %
CV ZIO ECTOPIC VE COUPLET RAW PERCENT: 0 %
CV ZIO ECTOPIC VE ISOLATED PERCENT: 0.06 %
CV ZIO ECTOPIC VE TRIPLET RAW PERCENT: 0 %
CV ZIO ENROLLMENT END: NORMAL
CV ZIO ENROLLMENT START: NORMAL
CV ZIO L BIGEMINY DUR: 5.8 SEC
CV ZIO L BIGEMINY END: NORMAL
CV ZIO L BIGEMINY START: NORMAL
CV ZIO L TRIGEMINY DUR: 2.8 SEC
CV ZIO L TRIGEMINY END: NORMAL
CV ZIO L TRIGEMINY START: NORMAL
CV ZIO PATIENT EVENTS DIARIES: 1
CV ZIO PATIENT EVENTS TRIGGERS: 0
CV ZIO PAUSE COUNT: 0
CV ZIO PRESCRIPTION STATUS: NORMAL
CV ZIO SVT AVG BPM: 132 BPM
CV ZIO SVT BPM HIGH: 200 BPM
CV ZIO SVT BPM LOW: 77 BPM
CV ZIO SVT COUNT: 38
CV ZIO SVT F EPI AVG BPM: 178 BPM
CV ZIO SVT F EPI BEATS: 18 BEATS
CV ZIO SVT F EPI BPM HIGH: 200 BPM
CV ZIO SVT F EPI BPM LOW: 158 BPM
CV ZIO SVT F EPI DUR: 6.4 SEC
CV ZIO SVT F EPI END: NORMAL
CV ZIO SVT F EPI START: NORMAL
CV ZIO SVT L EPI AVG BPM: 149 BPM
CV ZIO SVT L EPI BEATS: NORMAL BEATS
CV ZIO SVT L EPI BPM HIGH: 167 BPM
CV ZIO SVT L EPI BPM LOW: 98 BPM
CV ZIO SVT L EPI DUR: 5417.6 SEC
CV ZIO SVT L EPI END: NORMAL
CV ZIO SVT L EPI START: NORMAL
CV ZIO TOTAL  ENROLLMENT PERIOD: NORMAL
CV ZIO VT AVG BPM: 163 BPM
CV ZIO VT BPM HIGH: 197 BPM
CV ZIO VT BPM LOW: 125 BPM
CV ZIO VT COUNT: 4
CV ZIO VT F EPI AVG BPM: 172
CV ZIO VT F EPI BEATS: 7 BEATS
CV ZIO VT F EPI BPM HIGH: 197
CV ZIO VT F EPI BPM LOW: 145
CV ZIO VT F EPI DUR: 2.5 SEC
CV ZIO VT F EPI END: NORMAL
CV ZIO VT F EPI START: NORMAL
CV ZIO VT L EPI AVG BPM: 172
CV ZIO VT L EPI BEATS: 7 BEATS
CV ZIO VT L EPI BPM HIGH: 197 BPM
CV ZIO VT L EPI BPM LOW: 145 BPM
CV ZIO VT L EPI DUR: 2.5
CV ZIO VT L EPI END: NORMAL
CV ZIO VT L EPI START: NORMAL

## 2024-12-11 ENCOUNTER — TELEPHONE (OUTPATIENT)
Dept: CARDIOLOGY | Facility: CLINIC | Age: 74
End: 2024-12-11
Payer: MEDICARE

## 2024-12-17 ENCOUNTER — TELEPHONE (OUTPATIENT)
Dept: CARDIOLOGY | Facility: CLINIC | Age: 74
End: 2024-12-17

## 2024-12-17 NOTE — TELEPHONE ENCOUNTER
Caller: Montana Franco    Relationship to patient: Self    Best call back number: [phone]     Chief complaint: FU    Type of visit: TELEHEALTH    Requested date:  ANY DAY, AFTERNOON IF POSSIBLE.     Additional notes:PT HAS A HARD TIME GETTING OUT WITH HIS LEG ULCERS, HE IS REQUESTING TELEHEALTH APPT. PLS CALL AND ADVISE

## 2025-01-02 ENCOUNTER — OFFICE VISIT (OUTPATIENT)
Dept: CARDIOLOGY | Facility: CLINIC | Age: 75
End: 2025-01-02
Payer: MEDICARE

## 2025-01-02 VITALS
BODY MASS INDEX: 29.82 KG/M2 | HEIGHT: 67 IN | WEIGHT: 190 LBS | HEART RATE: 85 BPM | SYSTOLIC BLOOD PRESSURE: 117 MMHG | DIASTOLIC BLOOD PRESSURE: 62 MMHG

## 2025-01-02 DIAGNOSIS — I10 PRIMARY HYPERTENSION: Primary | ICD-10-CM

## 2025-01-02 DIAGNOSIS — R06.02 SOB (SHORTNESS OF BREATH): ICD-10-CM

## 2025-01-02 RX ORDER — FUROSEMIDE 40 MG/1
40 TABLET ORAL DAILY
COMMUNITY

## 2025-01-02 RX ORDER — METOPROLOL SUCCINATE 25 MG/1
25 TABLET, EXTENDED RELEASE ORAL
Qty: 30 TABLET | Refills: 1 | Status: SHIPPED | OUTPATIENT
Start: 2025-01-02

## 2025-01-02 NOTE — PROGRESS NOTES
"Hospital follow up   Subjective:        Montana Franco is a 74 y.o. male who here for follow up      Hospital follow-up  HPI  74-year-old male with hypertension and shortness of breath here for the follow-up after the recent hospitalization denies any chest pains or tightness in the chest     Problems Addressed this Visit          Cardiac and Vasculature    Hypertension - Primary    Relevant Medications    furosemide (LASIX) 40 MG tablet    metoprolol succinate XL (TOPROL-XL) 25 MG 24 hr tablet       Pulmonary and Pneumonias    SOB (shortness of breath)     Diagnoses         Codes Comments    Primary hypertension    -  Primary ICD-10-CM: I10  ICD-9-CM: 401.9     SOB (shortness of breath)     ICD-10-CM: R06.02  ICD-9-CM: 786.05           .    The following portions of the patient's history were reviewed and updated as appropriate: allergies, current medications, past family history, past medical history, past social history, past surgical history and problem list.    Past Medical History:   Diagnosis Date    Cellulitis     LEFT LEG    COPD (chronic obstructive pulmonary disease)     Hypertension      reports that he has quit smoking. His smoking use included cigars, electronic cigarette, and pipe. He has never used smokeless tobacco. He reports that he does not drink alcohol and does not use drugs.   Family History   Problem Relation Age of Onset    Hypertension Mother     Hypertension Father        Review of Systems  Constitutional: No wt loss, fever, fatigue  Gastrointestinal: No nausea, abdominal pain  Behavioral/Psych: No insomnia or anxiety   Cardiovascular no chest pains or tightness in the chest  Objective:       Physical Exam  /62   Pulse 85   Ht 170.2 cm (67.01\")   Wt 86.2 kg (190 lb)   BMI 29.75 kg/m²   General appearance: No acute changes   Neck: Trachea midline; NECK, supple, no thyromegaly or lymphadenopathy   Lungs: Normal size and shape, normal breath sounds, equal distribution of air, no " rales and rhonchi   CV: S1-S2 regular, no murmurs, no rub, no gallop   Abdomen: Soft, nontender; no masses , no abnormal abdominal sounds   Extremities: No deformity , normal color , no peripheral edema   Skin: Normal temperature, turgor and texture; no rash, ulcers          Procedures      Echocardiogram:    Results for orders placed during the hospital encounter of 10/25/24    Adult Transthoracic Echo Complete W/ Cont if Necessary Per Protocol    Interpretation Summary    Left ventricular systolic function is hyperdynamic (EF > 70%).    Left ventricular diastolic function is consistent with (grade I) impaired relaxation.    Normal right ventricular cavity size noted. Hyperdynamic right ventricular systolic function noted.    The aortic valve leaflets are mildly to moderately calcified (aortic sclerosis)    Insufficient TR velocity profile to estimate the right ventricular systolic pressure.    There is a small (<1cm) pericardial effusion adjacent to the right atrium and right ventricle. There is no evidence of cardiac tamponade.          Current Outpatient Medications:     acetylcysteine (MUCOMYST) 20 % nebulizer solution, Inhale the contents of 1 vial by nebulization 2 (Two) Times a Day As Needed for cough/mucous/shortness of breath, Disp: 200 mL, Rfl: 6    albuterol sulfate  (90 Base) MCG/ACT inhaler, Inhale 2 puffs Every 4 (Four) Hours As Needed for Wheezing., Disp: 6.7 g, Rfl: 1    budesonide-formoterol (SYMBICORT) 160-4.5 MCG/ACT inhaler, Inhale 2 puffs 2 (Two) Times a Day., Disp: , Rfl:     hydroCHLOROthiazide (HYDRODIURIL) 25 MG tablet, TAKE 1 TABLET BY MOUTH DAILY (Patient taking differently: Take 1.5 tablets by mouth Daily.), Disp: 30 tablet, Rfl: 5    ipratropium-albuterol (COMBIVENT RESPIMAT)  MCG/ACT inhaler, Inhale 1 puff 4 (Four) Times a Day As Needed for Wheezing., Disp: , Rfl:     losartan (COZAAR) 50 MG tablet, Take 1 tablet by mouth Daily., Disp: , Rfl:     metoprolol succinate XL  (TOPROL-XL) 25 MG 24 hr tablet, Take 1 tablet by mouth Daily., Disp: 30 tablet, Rfl: 1    tamsulosin (FLOMAX) 0.4 MG capsule 24 hr capsule, Take 1 capsule by mouth Daily., Disp: 30 capsule, Rfl: 1    vitamin D3 125 MCG (5000 UT) capsule capsule, Take 1 capsule by mouth Daily., Disp: , Rfl:     acetylcysteine (MUCOMYST) 20 % nebulizer solution, Inhale the contents of 1 vial by nebulization 2 (Two) Times a Day for 90 days., Disp: 200 mL, Rfl: 3    aspirin 81 MG chewable tablet, Chew 1 tablet Daily., Disp: , Rfl:     azithromycin (ZITHROMAX) 500 MG tablet, Take 1 tablet by mouth Daily., Disp: 5 tablet, Rfl: 0    dexAMETHasone (DECADRON) 1 MG tablet, Take 1 tablet by mouth Daily. (Patient not taking: Reported on 1/2/2025), Disp: , Rfl:     furosemide (LASIX) 40 MG tablet, Take 1 tablet by mouth Daily., Disp: , Rfl:     guaiFENesin (MUCINEX) 600 MG 12 hr tablet, Take 2 tablets by mouth 2 (Two) Times a Day., Disp: 10 tablet, Rfl: 0   Assessment:                Plan:          ICD-10-CM ICD-9-CM   1. Primary hypertension  I10 401.9   2. SOB (shortness of breath)  R06.02 786.05     1. Primary hypertension  Blood pressure is high we will restart metoprolol    2. SOB (shortness of breath)  Check Holter monitor      Pt not taking metoprolol    Restart metoprolol    Holter has high heart rate    Extra metoprolol during fast heart beat    1 yr  COUNSELING:    Montana [place] was given to patient for the following topics: diagnostic results, risk factor reductions, impressions, risks and benefits of treatment options and importance of treatment compliance .       SMOKING COUNSELING:        Dictated using Dragon dictation

## 2025-02-04 ENCOUNTER — HOSPITAL ENCOUNTER (OUTPATIENT)
Dept: OCCUPATIONAL THERAPY | Facility: HOSPITAL | Age: 75
Setting detail: THERAPIES SERIES
Discharge: HOME | End: 2025-02-04
Payer: MEDICARE

## 2025-02-04 DIAGNOSIS — I89.0 LYMPHEDEMA OF BOTH LOWER EXTREMITIES: Primary | ICD-10-CM

## 2025-02-04 PROCEDURE — 97166 OT EVAL MOD COMPLEX 45 MIN: CPT

## 2025-02-04 PROCEDURE — 97535 SELF CARE MNGMENT TRAINING: CPT

## 2025-02-04 NOTE — THERAPY EVALUATION
Outpatient Occupational Therapy Lymphedema Initial Evaluation  [facility]     Patient Name: Montana Franco  : 1950  MRN: [id number]  Today's Date: 2025      Visit Date: 2025    Patient Active Problem List   Diagnosis    Dry eyes    Dry mouth    Low back pain    Pinched nerve    COPD (chronic obstructive pulmonary disease)    SOB (shortness of breath)    Hypertension    Cellulitis of left foot    Hyperglycemia    Chronic respiratory failure with hypoxia        Past Medical History:   Diagnosis Date    Cellulitis     LEFT LEG    COPD (chronic obstructive pulmonary disease)     Hypertension         No past surgical history on file.      Visit Dx:     ICD-10-CM ICD-9-CM   1. Lymphedema of both lower extremities  I89.0 457.1        Patient History       Row Name 25 1400             History    Chief Complaint Swelling  -RE      Date Current Problem(s) Began 24  -RE      Brief Description of Current Complaint Patient is a 74-year-old male who presents for outpatient OT lymphedema evaluation.  He reports a history of intermittent lower extremity swelling since about .  He attributed most of his leg swelling to treatment with steroids for his COPD.  Patient reports that when he would stop taking steroids the swelling in his legs would resolve.  This most recent bout which started in 2024 has resulted in ongoing edema.  Patient has been treated at the wound care clinic.  He was also hospitalized due to infection.  -RE      Patient/Caregiver Goals Know what to do to help the symptoms;Decrease swelling  -RE      How has patient tried to help current problem? elevation, Unna boots, compression stockings  -RE         Pain     Pain at Present 0  -RE         Fall Risk Assessment    Any falls in the past year: No  -RE      Number of falls reported in the last 12 months 0  -RE         Services    Are you currently receiving Home Health services Yes  -RE         Daily Activities    Primary  "Language English  -RE      Are you able to read Yes  -RE      Are you able to write Yes  -RE      How does patient learn best? Listening;Reading;Demonstration  -RE      Teaching needs identified Home Exercise Program;Management of Condition  -RE      Does patient have problems with the following? None  -RE      Barriers to learning None  -RE      Pt Participated in POC and Goals Yes  -RE         Safety    Are you being hurt, hit, or frightened by anyone at home or in your life? No  -RE      Are you being neglected by a caregiver No  -RE                User Key  (r) = Recorded By, (t) = Taken By, (c) = Cosigned By      Initials Name Provider Type    RE Ginny Leroy OTR Occupational Therapist                     Lymphedema       Row Name 02/04/25 1500             Subjective Pain    Able to rate subjective pain? yes  -RE      Pre-Treatment Pain Level 0  -RE      Post-Treatment Pain Level 0  -RE         Lymphedema Assessment    Lymphedema Classification RLE:;LLE:;stage 2 (Spontaneously Irreversible)  -RE      Infections or Cellulitis? yes  -RE      Infection/Cellulitis Treatment IV antibiotics  -RE         LLIS - Physical Concerns    The amount of pain associated with my lymphedema is: 1  -RE      The amount of limb heaviness associated with my lymphedema is: 2  -RE      The amount of skin tightness associated with my lymphedema is: 2  -RE      The size of my swollen limb(s) seems: 2  -RE      Lymphedema affects the movement of my swollen limb(s): 2  -RE      The strength in my swollen limb(s) is: 2  -RE         LLIS - Psychosocial Concerns    Lymphedema affects my body image (i.e., \"how I think I look\"). 1  -RE      Lymphedema affects my socializing with others. 2  -RE      Lymphedema affects my intimate relations with spouse or partner (rate 0 if not applicable 3  -RE      Lymphedema \"gets me down\" (i.e., depression, frustration, or anger) 1  -RE      I must rely on others for help due to my lymphedema. 3  -RE      " I know what to do to manage my lymphedema 3  -RE         LLIS - Functional Concerns    Lymphedema affects my ability to perform self-care activities (i.e. eating, dressing, hygiene) 3  -RE      Lymphedema affects my ability to perform routine home or work-related activities. 3  -RE      Lymphedema affects my performance of preferred leisure activities. 3  -RE      Lymphedema affects proper fit of clothing/shoes 2  -RE      Lymphedema affects my sleep 3  -RE         General ROM    GENERAL ROM COMMENTS Patient is unable to reach his feet due to limitations from back pain.  -RE         MMT (Manual Muscle Testing)    General MMT Comments Generalized weakness.  Walks with a cane.  Intermittent use of oxygen.  -RE         Lymphedema Edema Assessment    Ptting Edema Category By grade out of 4  -RE      Pitting Edema + 3/4;Moderate;Severe  -RE      Stemmer Sign bilateral:;positive  -RE      Hull Hump bilateral:;negative  -RE         Skin Changes/Observations    Location/Assessment Lower Extremity  -RE      Lower Extremity Conditions bilateral:;intact;scaly;crust;inflamed;fragile  -RE      Lower Extremity Color/Pigment bilateral:;hyperpigmented;brawny  -RE         Lymphedema Sensation    Lymphedema Sensation Reports RLE:;LLE:;numbness  -RE      Lymphedema Sensation Tests light touch  -RE      Lymphedema Light Touch LLE:;RLE:;moderate impairment  -RE         Lymphedema Measurements    Measurement Type(s) Circumferential  -RE      Circumferential Areas Lower extremities  -RE         BLE Circumferential (cm)    Measurement Location 1 10 cm above knee  -RE      Left 1 46 cm  -RE      Right 1 49.5 cm  -RE      Measurement Location 2 Knee  -RE      Left 2 42.5 cm  -RE      Right 2 42.5 cm  -RE      Measurement Location 3 10 cm below knee  -RE      Left 3 39 cm  -RE      Right 3 41.5 cm  -RE      Measurement Location 4 20 cm below knee  -RE      Left 4 32.2 cm  -RE      Right 4 36 cm  -RE      Measurement Location 5 30 cm below  "knee  -RE      Left 5 22.5 cm  -RE      Right 5 25.5 cm  -RE      Measurement Location 6 Ankle  -RE      Left 6 22.6 cm  -RE      Right 6 22.5 cm  -RE      Measurement Location 7 Midfoot  -RE      Left 7 25.5 cm  -RE      Right 7 26 cm  -RE      LLE Circumferential Total 230.3 cm  -RE      RLE Circumferential Total 243.5 cm  -RE         Lymphedema Life Impact Scale Totals    A.  Total Q1 - Q17 (Do not include Q18) 38  -RE      B.  Total number of questions answered (Q1-Q17) 17  -RE      C. Divide A by B 2.24  -RE      D. Multiple C by 25 56  -RE                User Key  (r) = Recorded By, (t) = Taken By, (c) = Cosigned By      Initials Name Provider Type    Ginny Churchill OTR Occupational Therapist                            Therapy Education  Education Details: Discussed lymphedema treatment including estimated duration. Gave patient \"Healthy Habits for Lymphedema Patients\" and \"What is Lymphedema\" and reviewed with patient. Discussed disease process and what to expect from therapy.  Given: Edema management, Symptoms/condition management, Bandaging/dressing change  Program: New  How Provided: Verbal, Written  Provided to: Patient  Level of Understanding: Teach back education performed, Verbalized  91216 - OT Self Care/Mgmt Minutes: 15         OT Goals       Row Name 02/04/25 1700          OT Short Term Goals    STG Date to Achieve 03/04/25  -RE     STG 1 Patient will demonstrate proper awareness of “What is Lymphedema?” and \"Healthy Habits\" for improved prevention, management, care of symptoms and ease of transition to self-care of condition.  -RE     STG 1 Progress New  -RE     STG 2 Patient independent and compliant with self-wrapping techniques of compression bandages and/or velcro products with assistance of caregiver as needed for improved self-management of condition.  -RE     STG 2 Progress New  -RE     STG 3 Patient will demonstrate decreased net edema of bilateral lower extremities >/= 7-15cm  for " decrease in edema, symptoms, decreased risk of infection and improved skin care/transition to self-care of condition.  -RE     STG 3 Progress New  -RE        Long Term Goals    LTG Date to Achieve 05/04/25  -RE     LTG 1 Patient will demonstrate decreased net edema of bilateral lower extremities >/= 16-30 cm  for decrease in edema, symptoms, decreased risk of infection and improved skin care/transition to self-care of condition.  -RE     LTG 1 Progress New  -RE     LTG 2 Patient independent and compliant with initial home exercise program focused on diaphragmatic breathing, range of motion, flexibility to decrease edema and improve lymphatic flow for decreased edema and decreased risk of infection.  -RE     LTG 2 Progress New  -RE     LTG 3 Patient to improve LLIS by 10 points by discharge.  -RE     LTG 3 Progress New  -RE     LTG 4 Patient independent and compliant with use and care of compression garments and/or Velcro products with assistance of a caregiver as needed to promote self-care independence.  -RE     LTG 4 Progress New  -RE        Time Calculation    OT Goal Re-Cert Due Date 05/04/25  -RE               User Key  (r) = Recorded By, (t) = Taken By, (c) = Cosigned By      Initials Name Provider Type    Ginny Churchill OTR Occupational Therapist                     OT Assessment/Plan       Row Name 02/04/25 3978          OT Assessment    Functional Limitations Limitation in home management  -RE     Impairments Edema;Impaired lymphatic circulation;Impaired venous circulation  -RE     Assessment Comments Patient is a 74-year-old male who presents with signs and symptoms consistent with bilateral lower extremity lymphedema which extends from toes to above his knees.  Edema is firm with 3+ pitting.  The total circumference of the right lower extremity is 243.5 cm and the left lower extremity is 230.3 cm.  Comorbidities which may affect progress include limited mobility.  He could benefit from Complete  Decongestive Therapy to decrease edema, protect and improve skin integrity, decrease the risk of infection, and to learn self-care strategies.  Patient will require the use of compression products both during treatment and after.  I have recommended the use of a CircAid reduction kit for the lower legs and Juxtalite ankle fott wraps  for both feet and circaid compressive undersocks for use during the intensive phase of treatment.  -RE     OT Diagnosis Bilateral lower extremity lymphedema  -RE     OT Rehab Potential Good  -RE     Patient/caregiver participated in establishment of treatment plan and goals Yes  -RE     Patient would benefit from skilled therapy intervention Yes  -RE        OT Plan    Predicted Duration of Therapy Intervention (OT) Since transportation is difficult, initially patient will try 1 time per week for 8 weeks.  If his lymphedema does not respond or he has increased edema in the proximal legs I would recommend 3-4 times a week for 4 to 6 weeks.  -RE     Planned CPT's? OT EVAL MOD COMPLEXITY: 21388;OT SELF CARE/MGMT/TRAIN 15 MIN: 26853;OT MANUAL THERAPY EA 15 MIN: 24835  -RE     Planned Therapy Interventions (Optional Details) home exercise program;patient/family education;manual therapy techniques;other (see comments)  -RE     OT Plan Comments Order patient's compression products from [employer]  -RE               User Key  (r) = Recorded By, (t) = Taken By, (c) = Cosigned By      Initials Name Provider Type    Ginny Churchill OTR Occupational Therapist                              Time Calculation:   OT Start Time: 1430  OT Stop Time: 1525  OT Time Calculation (min): 55 min  Total Timed Code Minutes- OT: 15 minute(s)  Timed Charges  55792 - OT Self Care/Mgmt Minutes: 15  Untimed Charges  OT Eval/Re-eval Minutes: 40  Total Minutes  Timed Charges Total Minutes: 15  Untimed Charges Total Minutes: 40   Total Minutes: 55     Therapy Charges for Today       Code Description Service Date Service  Provider Modifiers Qty    82407164282 HC OT SELF CARE/MGMT/TRAIN EA 15 MIN 2/4/2025 Ginny Leroy OTR GO 1    40209750196 HC OT EVAL MOD COMPLEXITY 3 2/4/2025 Ginny Leroy OTR GO 1          Dictated utilizing Dragon dictation:  Much of this encounter note is an electronic transcription/translation of spoken language to printed text. The electronic translation of spoken language may permit erroneous, or at times, nonsensical words or phrases to be inadvertently transcribed; Although I have reviewed the note for such errors, some may still exist.            REMA Sheehan  2/4/2025

## 2025-03-13 ENCOUNTER — TELEPHONE (OUTPATIENT)
Dept: CARDIOLOGY | Facility: CLINIC | Age: 75
End: 2025-03-13
Payer: MEDICARE

## 2025-03-13 NOTE — TELEPHONE ENCOUNTER
REQUEST FOR CARDIAC CLEARANCE    Caller name: Montana Franco     Phone Number: [phone]    Surgeon's name: DR SANZ    Type of planned surgery: [facility] SURGERY    Date of planned surgery: 03/19/2025    Type of anesthesia:    Have you been experiencing chest pain or shortness of breath? NO    Is your doctor requesting for you to stop any of your medications prior to your surgery?     Where should we fax the clearance to? DID YOU RECEIVED CARDIAC CLEARANCE?  PLEASE FAX [facility] CARDIAC CLEARANCE #[phone]

## 2025-03-17 NOTE — TELEPHONE ENCOUNTER
NO SYMPTOMS, PER DENISSE [facility]REGOR[facility] CLEAR FOR CATARACT SURGERY, LETTER BEING SENT TO [facility].